# Patient Record
Sex: MALE | Race: WHITE | NOT HISPANIC OR LATINO | Employment: OTHER | URBAN - METROPOLITAN AREA
[De-identification: names, ages, dates, MRNs, and addresses within clinical notes are randomized per-mention and may not be internally consistent; named-entity substitution may affect disease eponyms.]

---

## 2017-06-13 ENCOUNTER — OFFICE VISIT (OUTPATIENT)
Dept: INTERNAL MEDICINE | Facility: CLINIC | Age: 27
End: 2017-06-13

## 2017-06-13 VITALS
DIASTOLIC BLOOD PRESSURE: 66 MMHG | BODY MASS INDEX: 24.09 KG/M2 | WEIGHT: 147 LBS | SYSTOLIC BLOOD PRESSURE: 132 MMHG | HEART RATE: 66 BPM | RESPIRATION RATE: 21 BRPM

## 2017-06-13 DIAGNOSIS — R53.83 OTHER FATIGUE: ICD-10-CM

## 2017-06-13 DIAGNOSIS — Z00.00 HEALTH CARE MAINTENANCE: Primary | ICD-10-CM

## 2017-06-13 LAB
ALBUMIN SERPL-MCNC: 4.7 G/DL (ref 3.2–4.8)
ALBUMIN/GLOB SERPL: 1.6 G/DL (ref 1.5–2.5)
ALP SERPL-CCNC: 70 U/L (ref 25–100)
ALT SERPL W P-5'-P-CCNC: 17 U/L (ref 7–40)
ANION GAP SERPL CALCULATED.3IONS-SCNC: 21 MMOL/L (ref 3–11)
ARTICHOKE IGE QN: 70 MG/DL (ref 0–130)
AST SERPL-CCNC: 29 U/L (ref 0–33)
BASOPHILS # BLD AUTO: 0.04 10*3/MM3 (ref 0–0.2)
BASOPHILS NFR BLD AUTO: 0.5 % (ref 0–1)
BILIRUB SERPL-MCNC: 0.6 MG/DL (ref 0.3–1.2)
BUN BLD-MCNC: 21 MG/DL (ref 9–23)
BUN/CREAT SERPL: 21 (ref 7–25)
CALCIUM SPEC-SCNC: 10.2 MG/DL (ref 8.7–10.4)
CHLORIDE SERPL-SCNC: 104 MMOL/L (ref 99–109)
CHOLEST SERPL-MCNC: 129 MG/DL (ref 0–200)
CO2 SERPL-SCNC: 15 MMOL/L (ref 20–31)
CREAT BLD-MCNC: 1 MG/DL (ref 0.6–1.3)
DEPRECATED RDW RBC AUTO: 39.9 FL (ref 37–54)
EOSINOPHIL # BLD AUTO: 0.06 10*3/MM3 (ref 0.1–0.3)
EOSINOPHIL NFR BLD AUTO: 0.7 % (ref 0–3)
ERYTHROCYTE [DISTWIDTH] IN BLOOD BY AUTOMATED COUNT: 12.5 % (ref 11.3–14.5)
GFR SERPL CREATININE-BSD FRML MDRD: 92 ML/MIN/1.73
GLOBULIN UR ELPH-MCNC: 2.9 GM/DL
GLUCOSE BLD-MCNC: 88 MG/DL (ref 70–100)
HCT VFR BLD AUTO: 47.7 % (ref 38.9–50.9)
HDLC SERPL-MCNC: 42 MG/DL (ref 40–60)
HGB BLD-MCNC: 15.8 G/DL (ref 13.1–17.5)
IMM GRANULOCYTES # BLD: 0.02 10*3/MM3 (ref 0–0.03)
IMM GRANULOCYTES NFR BLD: 0.2 % (ref 0–0.6)
LYMPHOCYTES # BLD AUTO: 2.26 10*3/MM3 (ref 0.6–4.8)
LYMPHOCYTES NFR BLD AUTO: 27.8 % (ref 24–44)
MCH RBC QN AUTO: 29.2 PG (ref 27–31)
MCHC RBC AUTO-ENTMCNC: 33.1 G/DL (ref 32–36)
MCV RBC AUTO: 88 FL (ref 80–99)
MONOCYTES # BLD AUTO: 0.68 10*3/MM3 (ref 0–1)
MONOCYTES NFR BLD AUTO: 8.4 % (ref 0–12)
NEUTROPHILS # BLD AUTO: 5.06 10*3/MM3 (ref 1.5–8.3)
NEUTROPHILS NFR BLD AUTO: 62.4 % (ref 41–71)
PLATELET # BLD AUTO: 265 10*3/MM3 (ref 150–450)
PMV BLD AUTO: 10.9 FL (ref 6–12)
POTASSIUM BLD-SCNC: 4.3 MMOL/L (ref 3.5–5.5)
PROT SERPL-MCNC: 7.6 G/DL (ref 5.7–8.2)
RBC # BLD AUTO: 5.42 10*6/MM3 (ref 4.2–5.76)
SODIUM BLD-SCNC: 140 MMOL/L (ref 132–146)
T4 FREE SERPL-MCNC: 1.17 NG/DL (ref 0.89–1.76)
TRIGL SERPL-MCNC: 43 MG/DL (ref 0–150)
WBC NRBC COR # BLD: 8.12 10*3/MM3 (ref 3.5–10.8)

## 2017-06-13 PROCEDURE — 84439 ASSAY OF FREE THYROXINE: CPT | Performed by: INTERNAL MEDICINE

## 2017-06-13 PROCEDURE — 80053 COMPREHEN METABOLIC PANEL: CPT | Performed by: INTERNAL MEDICINE

## 2017-06-13 PROCEDURE — 36415 COLL VENOUS BLD VENIPUNCTURE: CPT | Performed by: INTERNAL MEDICINE

## 2017-06-13 PROCEDURE — 80061 LIPID PANEL: CPT | Performed by: INTERNAL MEDICINE

## 2017-06-13 PROCEDURE — 85025 COMPLETE CBC W/AUTO DIFF WBC: CPT | Performed by: INTERNAL MEDICINE

## 2017-06-13 PROCEDURE — 99203 OFFICE O/P NEW LOW 30 MIN: CPT | Performed by: INTERNAL MEDICINE

## 2017-06-13 NOTE — PROGRESS NOTES
Subjective   Ronn Lebron is a 26 y.o. male.     History of Present Illness   He has felt bad for one week.  Weak and mentally bad.  No specific problems that seemed to relate to feelings.  No specific medical complaints, no fever, pains or other sx.  He just feels different and not good.  Wanted to get labs.    The following portions of the patient's history were reviewed and updated as appropriate: allergies, current medications, past family history, past medical history, past social history, past surgical history and problem list.   Looking for a job.  No smoke, drink or drugs.    Review of Systems   Constitutional: Positive for fatigue. Negative for appetite change, chills and fever.   HENT: Negative.  Negative for sinus pressure and sore throat.    Eyes: Negative.    Respiratory: Negative.  Negative for cough, chest tightness, shortness of breath and wheezing.    Cardiovascular: Negative.  Negative for chest pain and palpitations.   Gastrointestinal: Negative.  Negative for abdominal distention and abdominal pain.   Genitourinary: Negative.    Musculoskeletal: Negative.    Psychiatric/Behavioral: Positive for dysphoric mood.       Objective   Physical Exam   Constitutional: He appears well-developed and well-nourished.   Neck: Normal range of motion. Neck supple.   Cardiovascular: Normal rate, regular rhythm and normal heart sounds.  Exam reveals no gallop and no friction rub.    No murmur heard.  Pulmonary/Chest: Effort normal and breath sounds normal. No respiratory distress. He has no wheezes. He has no rales. He exhibits no tenderness.   Abdominal: Soft. Bowel sounds are normal.   Nursing note and vitals reviewed.      Assessment/Plan   Ronn was seen today for fatigue.    Diagnoses and all orders for this visit:    Health care maintenance  -     Comprehensive Metabolic Panel  -     Lipid Panel  -     CBC & Differential  -     CBC Auto Differential    Other fatigue  -     T4, Free    Will check  labs.  Believe this is related to some depression.  Told him if no specific findings, and symptoms continue, he will call.

## 2017-07-14 ENCOUNTER — OFFICE VISIT (OUTPATIENT)
Dept: INTERNAL MEDICINE | Facility: CLINIC | Age: 27
End: 2017-07-14

## 2017-07-14 VITALS
SYSTOLIC BLOOD PRESSURE: 124 MMHG | DIASTOLIC BLOOD PRESSURE: 76 MMHG | WEIGHT: 147 LBS | BODY MASS INDEX: 24.09 KG/M2 | RESPIRATION RATE: 14 BRPM | TEMPERATURE: 97.7 F | HEART RATE: 58 BPM

## 2017-07-14 DIAGNOSIS — M26.609 TMJ (TEMPOROMANDIBULAR JOINT SYNDROME): Primary | ICD-10-CM

## 2017-07-14 PROCEDURE — 99213 OFFICE O/P EST LOW 20 MIN: CPT | Performed by: PHYSICIAN ASSISTANT

## 2017-07-14 RX ORDER — FLUOXETINE HYDROCHLORIDE 20 MG/1
1 CAPSULE ORAL DAILY
Refills: 0 | COMMUNITY
Start: 2017-06-14 | End: 2017-12-28

## 2017-07-14 NOTE — PROGRESS NOTES
Subjective   Ronn Lebron is a 26 y.o. male.   Chief Complaint   Patient presents with   • Earache     x 1 week, left ear     History of Present Illness     Pt here with left ear pain x 1 week.  He reports pain near the ear anteriorly and says that it hurts to open and close his mouth or bite down.  No ear drainage, fever, cough, PND, sore throat.      The following portions of the patient's history were reviewed and updated as appropriate: allergies, current medications, past family history, past medical history, past social history, past surgical history and problem list.    Review of Systems   Constitutional: Negative.    HENT: Positive for ear pain. Negative for sore throat.    Respiratory: Negative.  Negative for cough.    Cardiovascular: Negative.    Gastrointestinal: Negative.    Endocrine: Negative for polyuria.   Genitourinary: Negative.    Musculoskeletal: Negative.    Psychiatric/Behavioral: Negative.        Objective   Physical Exam   Constitutional: He is oriented to person, place, and time. He appears well-developed and well-nourished.   HENT:   Head: Normocephalic.   Right Ear: External ear normal.   Left Ear: External ear normal.   Mouth/Throat: Oropharynx is clear and moist.   Tenderness to palpation over left TMJ   Neck: Normal range of motion. No thyromegaly present.   Cardiovascular: Normal rate, regular rhythm and normal heart sounds.    No murmur heard.  Pulmonary/Chest: Effort normal and breath sounds normal.   Lymphadenopathy:     He has no cervical adenopathy.   Neurological: He is alert and oriented to person, place, and time.   Psychiatric: He has a normal mood and affect. His behavior is normal. Judgment and thought content normal.       Assessment/Plan   Ronn was seen today for earache.    Diagnoses and all orders for this visit:    TMJ (temporomandibular joint syndrome)    Instructed to take NSAIDs BID for the next week.

## 2017-12-26 ENCOUNTER — TELEPHONE (OUTPATIENT)
Dept: INTERNAL MEDICINE | Facility: CLINIC | Age: 27
End: 2017-12-26

## 2017-12-26 NOTE — TELEPHONE ENCOUNTER
S/W PT, STATES HE DEVELOPED A RING WORM ON HIS BACK AND TREATED WITH TEA TREE OIL AND THEN LOTRMEN CREAM AND THAT IT HAS SPREAD AND THAT HE NOW HAS MULTIPLE SPOTS.    DISCUSSED WITH DR GUERRA.  STATES NEEDS TO BE SEEN.    INFORMED PT OF NEED TO BE SEEN.  APPT SCHEDULED FOR Thursday AT 11:15AM WITH DR GUERRA.  LEVI VARELA APPREC.

## 2017-12-26 NOTE — TELEPHONE ENCOUNTER
----- Message from Emelyn Geiger sent at 12/26/2017 10:32 AM EST -----  Patient was diagnosed with ring worm and has some questions.  Patient can be reached at [phone].

## 2017-12-28 ENCOUNTER — OFFICE VISIT (OUTPATIENT)
Dept: INTERNAL MEDICINE | Facility: CLINIC | Age: 27
End: 2017-12-28

## 2017-12-28 VITALS
DIASTOLIC BLOOD PRESSURE: 64 MMHG | HEART RATE: 56 BPM | BODY MASS INDEX: 23.76 KG/M2 | SYSTOLIC BLOOD PRESSURE: 114 MMHG | TEMPERATURE: 98.3 F | WEIGHT: 145 LBS | RESPIRATION RATE: 16 BRPM

## 2017-12-28 DIAGNOSIS — Z72.51 HIGH RISK HETEROSEXUAL BEHAVIOR: ICD-10-CM

## 2017-12-28 DIAGNOSIS — L42 PITYRIASIS ROSEA: Primary | ICD-10-CM

## 2017-12-28 LAB
HAV IGM SERPL QL IA: NORMAL
HBV CORE IGM SERPL QL IA: NORMAL
HBV SURFACE AG SERPL QL IA: NORMAL
HCV AB SER DONR QL: NORMAL
HIV1+2 AB SER QL: NORMAL

## 2017-12-28 PROCEDURE — 87491 CHLMYD TRACH DNA AMP PROBE: CPT | Performed by: INTERNAL MEDICINE

## 2017-12-28 PROCEDURE — G0432 EIA HIV-1/HIV-2 SCREEN: HCPCS | Performed by: INTERNAL MEDICINE

## 2017-12-28 PROCEDURE — 80074 ACUTE HEPATITIS PANEL: CPT | Performed by: INTERNAL MEDICINE

## 2017-12-28 PROCEDURE — 87591 N.GONORRHOEAE DNA AMP PROB: CPT | Performed by: INTERNAL MEDICINE

## 2017-12-28 PROCEDURE — 86696 HERPES SIMPLEX TYPE 2 TEST: CPT | Performed by: INTERNAL MEDICINE

## 2017-12-28 PROCEDURE — 86695 HERPES SIMPLEX TYPE 1 TEST: CPT | Performed by: INTERNAL MEDICINE

## 2017-12-28 PROCEDURE — 36415 COLL VENOUS BLD VENIPUNCTURE: CPT | Performed by: INTERNAL MEDICINE

## 2017-12-28 PROCEDURE — 99213 OFFICE O/P EST LOW 20 MIN: CPT | Performed by: INTERNAL MEDICINE

## 2017-12-28 PROCEDURE — 86592 SYPHILIS TEST NON-TREP QUAL: CPT | Performed by: INTERNAL MEDICINE

## 2017-12-28 NOTE — PROGRESS NOTES
Chief Complaint   Patient presents with   • Tinea     RINGWORM SPREADING X 2 WEEKS       History of Present Illness      He presents for an initial evaluation with pityriasis rosea on This has been present for two weeks. The condition is non-painful. Treatment has been administered at home. The prior treatment consisted of topical antifungals. He had a viral illness prior to this starting. In addition, he wants to be checked for STDs as he has had unprotected intercourse with multiple partners.    Review of Systems    GENERAL- Denies Unexplained Weight Loss, Fever, Chills, Sweats, Fatigue, Weakness or Malaise.    Medications    No current outpatient prescriptions on file.     Allergies    No Known Allergies    Problem List    There is no problem list on file for this patient.      Medications, Allergies, Problems List and Past History were reviewed and updated.    Physical Examination    /64 (BP Location: Left arm, Patient Position: Sitting, Cuff Size: Adult)  Pulse 56  Temp 98.3 °F (36.8 °C) (Temporal Artery )   Resp 16  Wt 65.8 kg (145 lb)  BMI 23.76 kg/m2      The patient has pityriasis rosea on The pityriasis is pale and pink. The affected skin is macular and the condition is noted to be spread over a wide area, well demarcated and has multiple lesions with collarettes.    Impression and Assessment    Pityriasis Rosea.    STD Screen.    Plan    Pityriasis Rosea Plan: The skin condition will be monitored.    STD Screen Plan: Safe sex practices were discussed.    Ronn was seen today for tinea.    Diagnoses and all orders for this visit:    Pityriasis rosea  -     RPR    High risk heterosexual behavior  -     Hepatitis Panel, Acute  -     RPR  -     HIV-1 / O / 2 Ag / Antibody 4th Generation  -     Chlamydia trachomatis, Neisseria gonorrhoeae, PCR - Swab, Urethra  -     HSV 1 & 2 - Specific Antibody, IgG          Return to Office    The patient was instructed to return for follow-up as needed.    The  patient was instructed to return sooner if the condition changes, worsens, or doesn't resolve.

## 2017-12-29 LAB
HSV1 IGG SER IA-ACNC: <0.91 INDEX (ref 0–0.9)
HSV2 IGG SER IA-ACNC: <0.91 INDEX (ref 0–0.9)
RPR SER QL: NORMAL

## 2018-01-01 LAB
C TRACH RRNA SPEC DONR QL NAA+PROBE: NEGATIVE
N GONORRHOEA DNA SPEC QL NAA+PROBE: NEGATIVE

## 2018-06-21 ENCOUNTER — TELEPHONE (OUTPATIENT)
Dept: INTERNAL MEDICINE | Facility: CLINIC | Age: 28
End: 2018-06-21

## 2018-06-21 RX ORDER — LEVOCETIRIZINE DIHYDROCHLORIDE 5 MG/1
5 TABLET, FILM COATED ORAL EVERY EVENING
Qty: 30 TABLET | Refills: 5 | Status: SHIPPED | OUTPATIENT
Start: 2018-06-21 | End: 2018-07-10 | Stop reason: ALTCHOICE

## 2018-06-21 NOTE — TELEPHONE ENCOUNTER
----- Message from April YOMI Solis sent at 6/21/2018  1:28 PM EDT -----  Contact: PT   CALL FROM PT REQUESTING A RX FOR ALLERGY MEDS STATES THAT OVER THE COUNTER MEDS ARE NOT HELPING AND THEY TOLD HIM AT THE PHARMACY THAT HE HAS REACHED HIS LIMIT FOR BUYING THEM OVER THE COUNTER. CALL BACK NUMBER FOR [phone]  USES [facility] - [id number] [facility] - [phone]  - [phone] (Phone)  [phone] (Fax)

## 2018-06-21 NOTE — TELEPHONE ENCOUNTER
Not sure what he wants, but can try flonase if he likes.  Call and see if wants to try a steroid nasal spray.

## 2018-07-06 ENCOUNTER — TELEPHONE (OUTPATIENT)
Dept: INTERNAL MEDICINE | Facility: CLINIC | Age: 28
End: 2018-07-06

## 2018-07-06 NOTE — TELEPHONE ENCOUNTER
----- Message from Aretha Sainz sent at 7/6/2018 12:42 PM EDT -----  [phone]    HE THINKS HIS NEW ALLERGY MED IS MAKING HIM REALLY TIRED.  AND HE ISNT SURE ITS WORKING AS WELL AS AS EVGENY CELAYA DOES.  ANY OTHER RECOMMENDATIONS?    [facility]

## 2018-07-10 DIAGNOSIS — J30.2 CHRONIC SEASONAL ALLERGIC RHINITIS, UNSPECIFIED TRIGGER: ICD-10-CM

## 2018-07-10 DIAGNOSIS — R09.81 NASAL CONGESTION: Primary | ICD-10-CM

## 2018-07-10 RX ORDER — FEXOFENADINE HCL AND PSEUDOEPHEDRINE HCI 180; 240 MG/1; MG/1
1 TABLET, EXTENDED RELEASE ORAL DAILY
Qty: 30 TABLET | Refills: 1 | Status: SHIPPED | OUTPATIENT
Start: 2018-07-10 | End: 2018-11-07 | Stop reason: SDUPTHER

## 2018-07-23 ENCOUNTER — OFFICE VISIT (OUTPATIENT)
Dept: INTERNAL MEDICINE | Facility: CLINIC | Age: 28
End: 2018-07-23

## 2018-07-23 VITALS
HEART RATE: 70 BPM | BODY MASS INDEX: 23.6 KG/M2 | DIASTOLIC BLOOD PRESSURE: 64 MMHG | TEMPERATURE: 98.2 F | WEIGHT: 144 LBS | RESPIRATION RATE: 21 BRPM | SYSTOLIC BLOOD PRESSURE: 130 MMHG

## 2018-07-23 DIAGNOSIS — N48.9 PENILE LESION: Primary | ICD-10-CM

## 2018-07-23 PROCEDURE — 99213 OFFICE O/P EST LOW 20 MIN: CPT | Performed by: INTERNAL MEDICINE

## 2018-07-23 NOTE — PROGRESS NOTES
Subjective   Ronn Lebron is a 27 y.o. male.     History of Present Illness   Last few days noticed a lesion on the bottom of his penis.  Not painful or itchy.  No unprotected sx in a long time.  No other episodes.  Had some bug bits on legs.    The following portions of the patient's history were reviewed and updated as appropriate: allergies, current medications, past medical history and problem list.    Review of Systems   Constitutional: Negative.  Negative for fatigue and fever.   Genitourinary: Negative for dysuria.       Objective   Physical Exam   Skin:   Small cystic like lesion on anterior dorsum of shaft.  Not open, not red, not painful.           Assessment/Plan   Ronn was seen today for bump on penis.    Diagnoses and all orders for this visit:    Penile lesion    This most likely, is a small cyst.  Does not appear to be herpes or other significant lesion.  Told him if not better or gone in 2 weeks to call and will refer to derm.

## 2018-07-30 ENCOUNTER — OFFICE VISIT (OUTPATIENT)
Dept: INTERNAL MEDICINE | Facility: CLINIC | Age: 28
End: 2018-07-30

## 2018-07-30 VITALS
WEIGHT: 144 LBS | BODY MASS INDEX: 23.6 KG/M2 | SYSTOLIC BLOOD PRESSURE: 124 MMHG | HEART RATE: 84 BPM | DIASTOLIC BLOOD PRESSURE: 66 MMHG | RESPIRATION RATE: 21 BRPM

## 2018-07-30 DIAGNOSIS — Z20.2 POSSIBLE EXPOSURE TO STD: Primary | ICD-10-CM

## 2018-07-30 LAB — HIV1+2 AB SER QL: NORMAL

## 2018-07-30 PROCEDURE — 99213 OFFICE O/P EST LOW 20 MIN: CPT | Performed by: INTERNAL MEDICINE

## 2018-07-30 PROCEDURE — 86592 SYPHILIS TEST NON-TREP QUAL: CPT | Performed by: INTERNAL MEDICINE

## 2018-07-30 PROCEDURE — 36415 COLL VENOUS BLD VENIPUNCTURE: CPT | Performed by: INTERNAL MEDICINE

## 2018-07-30 PROCEDURE — 86695 HERPES SIMPLEX TYPE 1 TEST: CPT | Performed by: INTERNAL MEDICINE

## 2018-07-30 PROCEDURE — 86696 HERPES SIMPLEX TYPE 2 TEST: CPT | Performed by: INTERNAL MEDICINE

## 2018-07-30 PROCEDURE — G0432 EIA HIV-1/HIV-2 SCREEN: HCPCS | Performed by: INTERNAL MEDICINE

## 2018-07-30 NOTE — PROGRESS NOTES
Subjective   Ronn Lebron is a 27 y.o. male.     History of Present Illness   He was concerned about sexual exposure.  Had a condom break.  Had a small red bump on his shaft a couple of weeks ago which is gone.    The following portions of the patient's history were reviewed and updated as appropriate: allergies, current medications, past medical history and problem list.    Review of Systems   Constitutional: Negative.    Genitourinary: Negative.        Objective   Physical Exam   Constitutional: He appears well-developed and well-nourished.   No exam.   Nursing note and vitals reviewed.        Assessment/Plan   Ronn was seen today for std testing.    Diagnoses and all orders for this visit:    Possible exposure to STD  -     HIV-1 / O / 2 Ag / Antibody 4th Generation  -     HSV 1 & 2 - Specific Antibody, IgG  -     RPR    Labs ordered.  Discussed with patient.

## 2018-08-01 ENCOUNTER — TELEPHONE (OUTPATIENT)
Dept: INTERNAL MEDICINE | Facility: CLINIC | Age: 28
End: 2018-08-01

## 2018-08-01 NOTE — TELEPHONE ENCOUNTER
----- Message from Emelyn Geiger sent at 8/1/2018 12:14 PM EDT -----  Patient states Dr. Cabezas said he would have lab results back in a few days.  Patient wants to know if those results are ready.  Call him at [phone].  Understands Dr. Cabezas out of the office today.

## 2018-08-29 ENCOUNTER — OFFICE VISIT (OUTPATIENT)
Dept: INTERNAL MEDICINE | Facility: CLINIC | Age: 28
End: 2018-08-29

## 2018-08-29 VITALS
TEMPERATURE: 98.1 F | WEIGHT: 145 LBS | SYSTOLIC BLOOD PRESSURE: 110 MMHG | HEART RATE: 65 BPM | RESPIRATION RATE: 16 BRPM | BODY MASS INDEX: 23.76 KG/M2 | DIASTOLIC BLOOD PRESSURE: 68 MMHG | OXYGEN SATURATION: 99 %

## 2018-08-29 DIAGNOSIS — H10.9 BACTERIAL CONJUNCTIVITIS OF LEFT EYE: Primary | ICD-10-CM

## 2018-08-29 PROCEDURE — 99213 OFFICE O/P EST LOW 20 MIN: CPT | Performed by: PHYSICIAN ASSISTANT

## 2018-08-29 RX ORDER — CIPROFLOXACIN HYDROCHLORIDE 3.5 MG/ML
2 SOLUTION/ DROPS TOPICAL 4 TIMES DAILY
Qty: 10 ML | Refills: 0 | Status: SHIPPED | OUTPATIENT
Start: 2018-08-29 | End: 2018-08-30 | Stop reason: SDUPTHER

## 2018-08-29 NOTE — PROGRESS NOTES
Chief Complaint   Patient presents with   • Conjunctivitis       Subjective       History of Present Illness     Ronn Lebron is a 27 y.o. male. He presents with <1 day history of L eye redness, itching, and discharge. Patient states he woke up this morning and his L eye was crusted over. He notes white-yellow discharge from eye and feeling that there is a film over his eye. It is itchy but not painful. No Sx in R eye. Pt wears contacts normally but did not put them in this morning, wearing glasses today. He has not tried anything for the treatment of this issue.       The following portions of the patient's history were reviewed and updated as appropriate: allergies, current medications, past medical history, past social history and problem list.    No Known Allergies  Social History   Substance Use Topics   • Smoking status: Never Smoker   • Smokeless tobacco: Not on file   • Alcohol use Not on file         Current Outpatient Prescriptions:   •  fexofenadine-pseudoephedrine (ALLEGRA-D ALLERGY & CONGESTION) 180-240 MG per 24 hr tablet, Take 1 tablet by mouth Daily., Disp: 30 tablet, Rfl: 1  •  ciprofloxacin (CILOXAN) 0.3 % ophthalmic solution, Administer 2 drops into the left eye 4 (Four) Times a Day., Disp: 10 mL, Rfl: 0    Review of Systems   Constitutional: Negative for chills, fatigue and fever.   HENT: Negative for congestion, ear pain, sore throat and trouble swallowing.    Eyes: Positive for discharge, redness and itching. Negative for blurred vision, double vision and pain.   Respiratory: Negative for cough, shortness of breath and wheezing.    Cardiovascular: Negative for chest pain and palpitations.   Gastrointestinal: Negative for abdominal pain, diarrhea, nausea and vomiting.   Genitourinary: Negative for dysuria and hematuria.   Musculoskeletal: Negative for back pain.   Skin: Negative for rash.   Neurological: Negative for dizziness, syncope, weakness and headache.   Hematological: Does not  bruise/bleed easily.       Objective   Vitals:    08/29/18 1750   BP: 110/68   Pulse: 65   Resp: 16   Temp: 98.1 °F (36.7 °C)   SpO2: 99%     Physical Exam   Constitutional: He appears well-developed and well-nourished.   HENT:   Head: Normocephalic and atraumatic.   Right Ear: Tympanic membrane, external ear and ear canal normal.   Left Ear: Tympanic membrane, external ear and ear canal normal.   Nose: Nose normal.   Mouth/Throat: Oropharynx is clear and moist and mucous membranes are normal.   Eyes: Pupils are equal, round, and reactive to light. Right eye exhibits no discharge. Left eye exhibits discharge. Right conjunctiva is not injected. Left conjunctiva is injected. Right eye exhibits normal extraocular motion. Left eye exhibits normal extraocular motion.   Cardiovascular: Normal rate, regular rhythm and intact distal pulses.    No murmur heard.  Pulmonary/Chest: Effort normal and breath sounds normal. He has no wheezes. He has no rales.   Lymphadenopathy:     He has no cervical adenopathy.   Skin: No rash noted.   Psychiatric: He has a normal mood and affect. His behavior is normal.       Assessment/Plan   Ronn was seen today for conjunctivitis.    Diagnoses and all orders for this visit:    Bacterial conjunctivitis of left eye  -     ciprofloxacin (CILOXAN) 0.3 % ophthalmic solution; Administer 2 drops into the left eye 4 (Four) Times a Day.      Patient advised to wear glasses for next 2 days and then begin new contacts.   Cool compress to eye as provides relief.            Return if symptoms worsen or fail to improve.

## 2018-08-30 ENCOUNTER — TELEPHONE (OUTPATIENT)
Dept: INTERNAL MEDICINE | Facility: CLINIC | Age: 28
End: 2018-08-30

## 2018-08-30 DIAGNOSIS — H10.9 BACTERIAL CONJUNCTIVITIS OF LEFT EYE: ICD-10-CM

## 2018-08-30 RX ORDER — CIPROFLOXACIN HYDROCHLORIDE 3.5 MG/ML
2 SOLUTION/ DROPS TOPICAL 4 TIMES DAILY
Qty: 10 ML | Refills: 0 | Status: SHIPPED | OUTPATIENT
Start: 2018-08-30 | End: 2018-10-19

## 2018-09-17 ENCOUNTER — TELEPHONE (OUTPATIENT)
Dept: INTERNAL MEDICINE | Facility: CLINIC | Age: 28
End: 2018-09-17

## 2018-09-17 RX ORDER — FLUOXETINE HYDROCHLORIDE 20 MG/1
CAPSULE ORAL
Qty: 30 CAPSULE | Refills: 1 | Status: SHIPPED | OUTPATIENT
Start: 2018-09-17 | End: 2019-04-09

## 2018-09-17 NOTE — TELEPHONE ENCOUNTER
Dr. Cabezas requested a one month follow up to discuss medication.  Patient has been scheduled and notified of appointment.

## 2018-09-17 NOTE — TELEPHONE ENCOUNTER
"Patient reports that he hasn't taken medication in a year but it was effective last time.  He feels \"a little down,\" and would like to restart medication.  He is willing to make follow up if necessary.  Thank you.  "

## 2018-10-19 ENCOUNTER — OFFICE VISIT (OUTPATIENT)
Dept: INTERNAL MEDICINE | Facility: CLINIC | Age: 28
End: 2018-10-19

## 2018-10-19 VITALS
HEIGHT: 66 IN | BODY MASS INDEX: 23.3 KG/M2 | TEMPERATURE: 98.3 F | HEART RATE: 89 BPM | WEIGHT: 145 LBS | RESPIRATION RATE: 18 BRPM | DIASTOLIC BLOOD PRESSURE: 70 MMHG | SYSTOLIC BLOOD PRESSURE: 120 MMHG

## 2018-10-19 DIAGNOSIS — J01.00 ACUTE MAXILLARY SINUSITIS, RECURRENCE NOT SPECIFIED: Primary | ICD-10-CM

## 2018-10-19 PROCEDURE — 99213 OFFICE O/P EST LOW 20 MIN: CPT | Performed by: NURSE PRACTITIONER

## 2018-10-19 RX ORDER — AMOXICILLIN AND CLAVULANATE POTASSIUM 875; 125 MG/1; MG/1
1 TABLET, FILM COATED ORAL EVERY 12 HOURS SCHEDULED
Qty: 20 TABLET | Refills: 0 | Status: SHIPPED | OUTPATIENT
Start: 2018-10-19 | End: 2018-10-29

## 2018-10-19 NOTE — PROGRESS NOTES
"Subjective:    Ronn Lebron is a 28 y.o. male.     Chief Complaint   Patient presents with   • Sinus Problem     sinus pressure x2 days    • Nasal Congestion     x 2 days        History of Present Illness   Patient complains of sinus pressure with green mucus and feeling tired with nasal congestion. He has a history of allergies that makes him prone to sinus infections once a year usually. Worsened by weather changes. No fever.    Current Outpatient Prescriptions:   •  fexofenadine-pseudoephedrine (ALLEGRA-D ALLERGY & CONGESTION) 180-240 MG per 24 hr tablet, Take 1 tablet by mouth Daily., Disp: 30 tablet, Rfl: 1  •  FLUoxetine (PROzac) 20 MG capsule, take 1 capsule by mouth once daily, Disp: 30 capsule, Rfl: 1  •  amoxicillin-clavulanate (AUGMENTIN) 875-125 MG per tablet, Take 1 tablet by mouth Every 12 (Twelve) Hours., Disp: 20 tablet, Rfl: 0     The following portions of the patient's history were reviewed and updated as appropriate: allergies, current medications, past family history, past medical history, past social history, past surgical history and problem list.    Review of Systems   Constitutional: Negative for chills, fatigue and fever.   HENT: Positive for congestion and sinus pressure. Negative for ear pain, postnasal drip, rhinorrhea, sneezing and sore throat.    Eyes: Negative for pain, discharge, redness and itching.   Respiratory: Negative for cough, chest tightness, shortness of breath and wheezing.    Cardiovascular: Negative for chest pain.   Gastrointestinal: Negative for abdominal pain, diarrhea, nausea and vomiting.   Musculoskeletal: Negative for arthralgias and myalgias.   Skin: Negative for rash.   Neurological: Negative for headaches.   Hematological: Negative for adenopathy.       Objective:    /70 (BP Location: Right arm, Patient Position: Sitting, Cuff Size: Adult)   Pulse 89   Temp 98.3 °F (36.8 °C)   Resp 18   Ht 166.4 cm (65.5\")   Wt 65.8 kg (145 lb)   BMI 23.76 kg/m² "     Physical Exam   Constitutional: He appears well-developed and well-nourished.   HENT:   Head: Normocephalic and atraumatic.   Right Ear: Hearing, tympanic membrane, external ear and ear canal normal.   Left Ear: Hearing, tympanic membrane, external ear and ear canal normal.   Nose: Mucosal edema present. Right sinus exhibits maxillary sinus tenderness. Right sinus exhibits no frontal sinus tenderness. Left sinus exhibits maxillary sinus tenderness. Left sinus exhibits no frontal sinus tenderness.   Mouth/Throat: Oropharynx is clear and moist and mucous membranes are normal. No oral lesions.   Mild sinus tenderness to palpation. Nasal congestion.   Eyes: Conjunctivae are normal.   Neck: Normal range of motion. Neck supple.   Cardiovascular: Normal rate and regular rhythm.    No murmur heard.  Pulmonary/Chest: Effort normal and breath sounds normal.   Lymphadenopathy:     He has no cervical adenopathy.   Skin: No rash noted.   Psychiatric: He has a normal mood and affect.   Nursing note and vitals reviewed.      Assessment/Plan:    Ronn was seen today for sinus problem and nasal congestion.    Diagnoses and all orders for this visit:    Acute maxillary sinusitis, recurrence not specified    Other orders  -     amoxicillin-clavulanate (AUGMENTIN) 875-125 MG per tablet; Take 1 tablet by mouth Every 12 (Twelve) Hours.        Return if symptoms worsen or fail to improve.

## 2018-10-29 ENCOUNTER — OFFICE VISIT (OUTPATIENT)
Dept: INTERNAL MEDICINE | Facility: CLINIC | Age: 28
End: 2018-10-29

## 2018-10-29 VITALS
RESPIRATION RATE: 20 BRPM | BODY MASS INDEX: 23.27 KG/M2 | HEART RATE: 92 BPM | DIASTOLIC BLOOD PRESSURE: 72 MMHG | WEIGHT: 142 LBS | SYSTOLIC BLOOD PRESSURE: 124 MMHG

## 2018-10-29 DIAGNOSIS — F32.9 REACTIVE DEPRESSION: Primary | ICD-10-CM

## 2018-10-29 PROCEDURE — 99212 OFFICE O/P EST SF 10 MIN: CPT | Performed by: INTERNAL MEDICINE

## 2018-10-29 NOTE — PROGRESS NOTES
Subjective   Ronn Lebron is a 28 y.o. male.     History of Present Illness     The following portions of the patient's history were reviewed and updated as appropriate: allergies, current medications, past medical history and problem list.   Has had intermittent spells of depression in past.  Started back on prozac a month or so ago and stopped after 2 weeks after he got better.  He feels fine now and is not on medication.    Review of Systems   Constitutional: Negative.    Eyes: Negative.    Psychiatric/Behavioral: Negative for dysphoric mood and depressed mood. The patient is not nervous/anxious.        Objective   Physical Exam   Constitutional: He appears well-developed and well-nourished.   No exam needed.   Nursing note and vitals reviewed.        Assessment/Plan   Ronn was seen today for anxiety.    Diagnoses and all orders for this visit:    Reactive depression    His sx are improved.  No treatment necessary.  Recheck here prn.

## 2018-11-07 DIAGNOSIS — R09.81 NASAL CONGESTION: ICD-10-CM

## 2018-11-07 DIAGNOSIS — J30.2 CHRONIC SEASONAL ALLERGIC RHINITIS: ICD-10-CM

## 2018-11-07 RX ORDER — FEXOFENADINE HYDROCHLORIDE AND PSEUDOEPHEDRINE HYDROCHLORIDE 180; 240 MG/1; MG/1
TABLET, FILM COATED, EXTENDED RELEASE ORAL
Qty: 30 TABLET | Refills: 5 | Status: SHIPPED | OUTPATIENT
Start: 2018-11-07 | End: 2020-10-13

## 2018-11-07 NOTE — TELEPHONE ENCOUNTER
----- Message from Reema Avendano sent at 11/7/2018 10:17 AM EST -----  PATIENT IS NEEDING A REFILL ON ALLEWARDA D    [facility] - [id number] [facility] - [phone] [facility] [phone] FX    PATIENT CALL BACK : [phone]    THANK YOU

## 2018-11-27 ENCOUNTER — TELEPHONE (OUTPATIENT)
Dept: INTERNAL MEDICINE | Facility: CLINIC | Age: 28
End: 2018-11-27

## 2018-11-27 RX ORDER — AZELASTINE HYDROCHLORIDE, FLUTICASONE PROPIONATE 137; 50 UG/1; UG/1
1 SPRAY, METERED NASAL DAILY
Qty: 1 BOTTLE | Refills: 3 | Status: SHIPPED | OUTPATIENT
Start: 2018-11-27 | End: 2020-10-13

## 2018-11-27 NOTE — TELEPHONE ENCOUNTER
----- Message from Denita Bird sent at 11/26/2018  3:50 PM EST -----  PATIENT STATES HE WOULD LIKE TO TRY [facility] FOR HIS INCREASED ALLERGY SYMPTOMS. [facility]. HE CAN BE REACHED [phone]

## 2019-04-09 ENCOUNTER — OFFICE VISIT (OUTPATIENT)
Dept: INTERNAL MEDICINE | Facility: CLINIC | Age: 29
End: 2019-04-09

## 2019-04-09 VITALS
TEMPERATURE: 98.9 F | BODY MASS INDEX: 24.03 KG/M2 | HEART RATE: 64 BPM | RESPIRATION RATE: 16 BRPM | HEIGHT: 66 IN | OXYGEN SATURATION: 98 % | SYSTOLIC BLOOD PRESSURE: 124 MMHG | DIASTOLIC BLOOD PRESSURE: 70 MMHG | WEIGHT: 149.5 LBS

## 2019-04-09 DIAGNOSIS — J01.01 ACUTE RECURRENT MAXILLARY SINUSITIS: Primary | ICD-10-CM

## 2019-04-09 PROCEDURE — 99213 OFFICE O/P EST LOW 20 MIN: CPT | Performed by: PHYSICIAN ASSISTANT

## 2019-04-09 RX ORDER — AMOXICILLIN AND CLAVULANATE POTASSIUM 875; 125 MG/1; MG/1
1 TABLET, FILM COATED ORAL 2 TIMES DAILY
Qty: 20 TABLET | Refills: 0 | Status: CANCELLED | OUTPATIENT
Start: 2019-04-09

## 2019-04-09 RX ORDER — CEFDINIR 300 MG/1
300 CAPSULE ORAL 2 TIMES DAILY
Qty: 20 CAPSULE | Refills: 0 | Status: SHIPPED | OUTPATIENT
Start: 2019-04-09 | End: 2019-04-29

## 2019-04-09 NOTE — PROGRESS NOTES
Chief Complaint   Patient presents with   • Sore Throat     x3 days   • Fatigue     x3 days   • URI     x3 days       Subjective       History of Present Illness     Ronn Lebron is a 28 y.o. male. He presents with 3 day history of sinusitis. Pt states he developed nasal congestion, HA, and sinus pain and pressure 3 days ago. Sinus pressure bilaterally, pain at L cheek/ behind L eye. He has felt very fatigued, had a mile sore throat with drainage. He is taking allegra daily and using flonase. Pt has recurrent sinus infections 3-4x/year. He had sinus infection about 5 weeks ago, had Augmentin which he finished as directed, seen at [facility]. Pt has seen an allergist in the past, about 8 years ago and did have allergy shots which helped some at that time. No recent visit to allergy specialist.         The following portions of the patient's history were reviewed and updated as appropriate: allergies, current medications, past medical history, past social history, past surgical history and problem list.    No Known Allergies  Social History     Tobacco Use   • Smoking status: Never Smoker   • Smokeless tobacco: Never Used   Substance Use Topics   • Alcohol use: No         Current Outpatient Medications:   •  ALLEGRA-D ALLERGY & CONGESTION 180-240 MG per 24 hr tablet, take 1 tablet by mouth once daily, Disp: 30 tablet, Rfl: 5  •  Azelastine-Fluticasone 137-50 MCG/ACT suspension, 1 spray into the nostril(s) as directed by provider Daily., Disp: 1 bottle, Rfl: 3  •  cefdinir (OMNICEF) 300 MG capsule, Take 1 capsule by mouth 2 (Two) Times a Day., Disp: 20 capsule, Rfl: 0    Review of Systems   Constitutional: Positive for fatigue. Negative for chills and fever.   HENT: Positive for congestion, postnasal drip, sinus pressure and sore throat. Negative for ear pain and trouble swallowing.    Respiratory: Negative for cough and shortness of breath.    Cardiovascular: Negative for chest pain.   Gastrointestinal: Negative for  abdominal pain, diarrhea, nausea and vomiting.   Genitourinary: Negative for dysuria.   Neurological: Positive for headache. Negative for dizziness.       Objective   Vitals:    04/09/19 1216   BP: 124/70   Pulse: 64   Resp: 16   Temp: 98.9 °F (37.2 °C)   SpO2: 98%     Physical Exam   Constitutional: He appears well-developed and well-nourished.   HENT:   Head: Normocephalic and atraumatic.   Right Ear: Tympanic membrane, external ear and ear canal normal.   Left Ear: Tympanic membrane, external ear and ear canal normal.   Nose: Congestion present. Right sinus exhibits no maxillary sinus tenderness and no frontal sinus tenderness. Left sinus exhibits maxillary sinus tenderness. Left sinus exhibits no frontal sinus tenderness.   Mouth/Throat: Oropharynx is clear and moist and mucous membranes are normal.   Eyes: Conjunctivae are normal.   Neck: Normal range of motion. Neck supple.   Cardiovascular: Normal rate and regular rhythm.   No murmur heard.  Pulmonary/Chest: Effort normal and breath sounds normal. He has no wheezes. He has no rales.   Lymphadenopathy:     He has no cervical adenopathy.   Psychiatric: He has a normal mood and affect. His behavior is normal.             Assessment/Plan   Ronn was seen today for sore throat, fatigue and uri.    Diagnoses and all orders for this visit:    Acute recurrent maxillary sinusitis  -     cefdinir (OMNICEF) 300 MG capsule; Take 1 capsule by mouth 2 (Two) Times a Day.  -     Ambulatory Referral to Allergy    Continue flonase and allegra. Finish all Abx through completion.   Referral to allergy given recurrent sinus infections with incomplete resolution.            Return if symptoms worsen or fail to improve.

## 2019-04-18 ENCOUNTER — TELEPHONE (OUTPATIENT)
Dept: INTERNAL MEDICINE | Facility: CLINIC | Age: 29
End: 2019-04-18

## 2019-04-18 RX ORDER — FLUOXETINE HYDROCHLORIDE 20 MG/1
20 CAPSULE ORAL DAILY
Qty: 30 CAPSULE | Refills: 2 | Status: SHIPPED | OUTPATIENT
Start: 2019-04-18 | End: 2020-10-13

## 2019-04-18 NOTE — TELEPHONE ENCOUNTER
Per Dr Cabezas   FLUoxetine (PROzac) 20 MG capsule [[id number]]   Order Details   Dose: 20 mg Route: Oral Frequency: Daily   Dispense Quantity: 30 capsule Refills: 2 Fills remaining: --           Sig: Take 1 capsule by mouth Daily        Sent to [facility] [place]     Informed pt of rx being sent in

## 2019-04-29 ENCOUNTER — OFFICE VISIT (OUTPATIENT)
Dept: INTERNAL MEDICINE | Facility: CLINIC | Age: 29
End: 2019-04-29

## 2019-04-29 VITALS
DIASTOLIC BLOOD PRESSURE: 80 MMHG | OXYGEN SATURATION: 97 % | RESPIRATION RATE: 16 BRPM | BODY MASS INDEX: 24.56 KG/M2 | HEIGHT: 66 IN | SYSTOLIC BLOOD PRESSURE: 118 MMHG | TEMPERATURE: 97 F | HEART RATE: 70 BPM | WEIGHT: 152.8 LBS

## 2019-04-29 DIAGNOSIS — J01.01 RECURRENT MAXILLARY SINUSITIS: Primary | ICD-10-CM

## 2019-04-29 PROCEDURE — 99213 OFFICE O/P EST LOW 20 MIN: CPT | Performed by: PHYSICIAN ASSISTANT

## 2019-04-29 NOTE — PROGRESS NOTES
Chief Complaint   Patient presents with   • Sinus Problem     x2 months       Subjective       History of Present Illness     Ronn Lebron is a 28 y.o. male. He presents for follow up of recurrent sinusitis. Pt recently tx with Cefdinir on 4/9/2019 and finished all Abx for sinusitis. Pt states he did have some improvement at that time, but not total resolution and symptoms have again worsened x1 week since finishing Abx. He c/o congestion, sinus pressure at cheeks and behind eyes, intermittent HA, and sore throat with drainage. He has an occasional dry cough, but no SOA or wheezing. Denies fever, chills, ear pain, abdominal pain, N/V/D. No voice change. He is taking allegra-D and astelin-fluticasone combo spray daily. No additional meds for this issue. He has not seen ENT for this issue. He does have an allergy referral pending for chronic allergies. He has been contact by allergy office, but has not yet scheduled as he wanted to be feeling better before scheduling allergy testing. Pt states this sinus infection has been present for 2+ months, and has long hx recurrent sinus infections.     The following portions of the patient's history were reviewed and updated as appropriate: allergies, current medications, past medical history, past social history and problem list.    No Known Allergies  Social History     Tobacco Use   • Smoking status: Never Smoker   • Smokeless tobacco: Never Used   Substance Use Topics   • Alcohol use: No         Current Outpatient Medications:   •  ALLEGRA-D ALLERGY & CONGESTION 180-240 MG per 24 hr tablet, take 1 tablet by mouth once daily, Disp: 30 tablet, Rfl: 5  •  Azelastine-Fluticasone 137-50 MCG/ACT suspension, 1 spray into the nostril(s) as directed by provider Daily., Disp: 1 bottle, Rfl: 3  •  FLUoxetine (PROzac) 20 MG capsule, Take 1 capsule by mouth Daily., Disp: 30 capsule, Rfl: 2    Review of Systems   Constitutional: Positive for fatigue. Negative for chills and fever.    HENT: Positive for congestion, postnasal drip, sinus pressure and sore throat. Negative for ear pain, sneezing and trouble swallowing.    Eyes: Negative for pain and visual disturbance.   Respiratory: Positive for cough. Negative for shortness of breath and wheezing.    Cardiovascular: Negative for chest pain.   Gastrointestinal: Negative for abdominal pain, diarrhea, nausea and vomiting.   Neurological: Positive for headache. Negative for dizziness.       Objective   Vitals:    04/29/19 1203   BP: 118/80   Pulse: 70   Resp: 16   Temp: 97 °F (36.1 °C)   SpO2: 97%     Physical Exam   Constitutional: He appears well-developed and well-nourished.   HENT:   Head: Normocephalic and atraumatic.   Right Ear: Tympanic membrane, external ear and ear canal normal.   Left Ear: Tympanic membrane, external ear and ear canal normal.   Nose: Rhinorrhea and congestion present. Right sinus exhibits no maxillary sinus tenderness and no frontal sinus tenderness. Left sinus exhibits no maxillary sinus tenderness and no frontal sinus tenderness.   Mouth/Throat: Oropharynx is clear and moist and mucous membranes are normal.   +maxillary pressure with deep palpation, but no pt reported pain   Eyes: Conjunctivae are normal.   Neck: Normal range of motion. Neck supple.   Cardiovascular: Normal rate and regular rhythm.   No murmur heard.  Pulmonary/Chest: Effort normal and breath sounds normal. He has no wheezes. He has no rales.   Lymphadenopathy:        Head (right side): No tonsillar adenopathy present.        Head (left side): No tonsillar adenopathy present.     He has no cervical adenopathy.        Right cervical: No superficial cervical and no posterior cervical adenopathy present.       Left cervical: No superficial cervical and no posterior cervical adenopathy present.   Psychiatric: He has a normal mood and affect. His behavior is normal.         Assessment/Plan   Ronn was seen today for sinus problem.    Diagnoses and all  orders for this visit:    Recurrent maxillary sinusitis  -     CT Sinus Without Contrast; Future      CT sinus for further investigation given 8 week history of sinus infection and hx recurrent sinus infections.  Advised to schedule with allergy even he does have sinus infection, as they would have valuable input on med recommendations as well. Pt will call- has office number.         Return if symptoms worsen or fail to improve.

## 2019-05-10 ENCOUNTER — HOSPITAL ENCOUNTER (OUTPATIENT)
Dept: CT IMAGING | Facility: HOSPITAL | Age: 29
Discharge: HOME | End: 2019-05-10
Admitting: PHYSICIAN ASSISTANT

## 2019-05-10 DIAGNOSIS — J01.01 RECURRENT MAXILLARY SINUSITIS: ICD-10-CM

## 2019-05-10 PROCEDURE — 70486 CT MAXILLOFACIAL W/O DYE: CPT

## 2019-07-08 ENCOUNTER — OFFICE VISIT (OUTPATIENT)
Dept: INTERNAL MEDICINE | Facility: CLINIC | Age: 29
End: 2019-07-08

## 2019-07-08 VITALS
SYSTOLIC BLOOD PRESSURE: 110 MMHG | TEMPERATURE: 98.6 F | HEART RATE: 68 BPM | OXYGEN SATURATION: 97 % | RESPIRATION RATE: 16 BRPM | DIASTOLIC BLOOD PRESSURE: 62 MMHG | WEIGHT: 159 LBS | BODY MASS INDEX: 25.55 KG/M2 | HEIGHT: 66 IN

## 2019-07-08 DIAGNOSIS — J06.9 ACUTE URI: Primary | ICD-10-CM

## 2019-07-08 PROCEDURE — 99213 OFFICE O/P EST LOW 20 MIN: CPT | Performed by: PHYSICIAN ASSISTANT

## 2019-07-08 RX ORDER — MONTELUKAST SODIUM 10 MG/1
TABLET ORAL
Refills: 1 | COMMUNITY
Start: 2019-06-10 | End: 2020-10-13

## 2019-07-08 NOTE — PROGRESS NOTES
Chief Complaint   Patient presents with   • Sinusitis     x2 days       Subjective       History of Present Illness     Ronn Lebron is a 28 y.o. male. He presents with 2 day history of congestion and sinus issues. Pt states he has had 2 days of increased congestion and PND. He does have sinus pressure at cheeks, but no pain. Mild intermittent HA as well. Pt denies fever, chills, ear pain, sore throat, cough, SOA, wheezing, abdominal pain, N/V/D. Pt states he was exposed to cigarette smoke in a confined space on Saturday, shortly before onset of symptoms. He is taking xyzal, singulair, and budesonide nasal spray as directed, prescribed by allergist. He has not tried any additional meds outside of routine Rx for allergies.     The following portions of the patient's history were reviewed and updated as appropriate: allergies, current medications, past medical history, past social history and problem list.    No Known Allergies  Social History     Tobacco Use   • Smoking status: Never Smoker   • Smokeless tobacco: Never Used   Substance Use Topics   • Alcohol use: No         Current Outpatient Medications:   •  budesonide (RINOCORT AQUA) 32 MCG/ACT nasal spray, 2 sprays into the nostril(s) as directed by provider Daily., Disp: , Rfl:   •  FLUoxetine (PROzac) 20 MG capsule, Take 1 capsule by mouth Daily., Disp: 30 capsule, Rfl: 2  •  montelukast (SINGULAIR) 10 MG tablet, , Disp: , Rfl: 1  •  ALLEGRA-D ALLERGY & CONGESTION 180-240 MG per 24 hr tablet, take 1 tablet by mouth once daily, Disp: 30 tablet, Rfl: 5  •  Azelastine-Fluticasone 137-50 MCG/ACT suspension, 1 spray into the nostril(s) as directed by provider Daily., Disp: 1 bottle, Rfl: 3    Review of Systems   Constitutional: Negative for chills and fever.   HENT: Positive for congestion and postnasal drip. Negative for ear pain and sore throat.         +sinus pressure   Eyes: Negative for pain.   Respiratory: Negative for cough and shortness of breath.     Cardiovascular: Negative for chest pain.   Gastrointestinal: Negative for abdominal pain, nausea and vomiting.   Neurological: Positive for headache. Negative for dizziness.       Objective   Vitals:    07/08/19 1307   BP: 110/62   Pulse: 68   Resp: 16   Temp: 98.6 °F (37 °C)   SpO2: 97%     Physical Exam   Constitutional: He appears well-developed and well-nourished.   HENT:   Head: Normocephalic and atraumatic.   Right Ear: Tympanic membrane, external ear and ear canal normal. No tenderness.   Left Ear: Tympanic membrane, external ear and ear canal normal. No tenderness.   Nose: Congestion present. Right sinus exhibits no maxillary sinus tenderness and no frontal sinus tenderness. Left sinus exhibits no maxillary sinus tenderness and no frontal sinus tenderness.   Mouth/Throat: Oropharynx is clear and moist and mucous membranes are normal. No oral lesions.   Eyes: Conjunctivae are normal. No scleral icterus.   Cardiovascular: Normal rate, regular rhythm and normal heart sounds.   Pulmonary/Chest: Effort normal and breath sounds normal. He has no wheezes. He has no rales. He exhibits no deformity.   Lymphadenopathy:        Head (right side): No submandibular and no tonsillar adenopathy present.        Head (left side): No submandibular and no tonsillar adenopathy present.     He has no cervical adenopathy.   Skin: No rash noted.   No atypical nevi.    Psychiatric: His behavior is normal.   Vitals reviewed.            Assessment/Plan   Ronn was seen today for sinusitis.    Diagnoses and all orders for this visit:    Acute URI    Advised sudafed in AM for 3-5 days in addition to his routine medications, as symptoms just began 2 days ago.  If symptoms persist, advised pt to call on Friday of this week to discuss Abx, as he is going out of town this weekend to [place] on vacation. Pt in agreement with plan.   Continue all routine meds.          Return if symptoms worsen or fail to improve.

## 2020-01-08 ENCOUNTER — OFFICE VISIT (OUTPATIENT)
Dept: INTERNAL MEDICINE | Facility: CLINIC | Age: 30
End: 2020-01-08

## 2020-01-08 VITALS
SYSTOLIC BLOOD PRESSURE: 122 MMHG | OXYGEN SATURATION: 95 % | TEMPERATURE: 97.8 F | BODY MASS INDEX: 24.4 KG/M2 | HEIGHT: 66 IN | WEIGHT: 151.8 LBS | HEART RATE: 79 BPM | RESPIRATION RATE: 18 BRPM | DIASTOLIC BLOOD PRESSURE: 80 MMHG

## 2020-01-08 DIAGNOSIS — M25.511 ACUTE PAIN OF RIGHT SHOULDER: Primary | ICD-10-CM

## 2020-01-08 PROCEDURE — 99213 OFFICE O/P EST LOW 20 MIN: CPT | Performed by: INTERNAL MEDICINE

## 2020-01-16 ENCOUNTER — TREATMENT (OUTPATIENT)
Dept: PHYSICAL THERAPY | Facility: CLINIC | Age: 30
End: 2020-01-16

## 2020-01-16 DIAGNOSIS — G89.29 CHRONIC RIGHT SHOULDER PAIN: Primary | ICD-10-CM

## 2020-01-16 DIAGNOSIS — M25.511 CHRONIC RIGHT SHOULDER PAIN: Primary | ICD-10-CM

## 2020-01-16 PROCEDURE — 97110 THERAPEUTIC EXERCISES: CPT | Performed by: PHYSICAL THERAPIST

## 2020-01-16 PROCEDURE — 97162 PT EVAL MOD COMPLEX 30 MIN: CPT | Performed by: PHYSICAL THERAPIST

## 2020-01-16 NOTE — PROGRESS NOTES
Physical Therapy Initial Evaluation and Plan of Care      Patient: Ronn Lebron   : 1990  Diagnosis/ICD-10 Code:  Chronic right shoulder pain [M25.511, G89.29]  Referring practitioner: Padmini Guerra MD    Subjective Evaluation    History of Present Illness  Mechanism of injury: Patient states he strained a rotator cuff muscle about a year ago which never fully got better, strained again last month during .             Patient Occupation: Works on [employer]  Current pain ratin  At best pain ratin  At worst pain ratin  Location: right posterior shoulder, under shoulder blade, into neck   Quality: dull ache, needle-like, pulling and tight  Relieving factors: rest and support  Aggravating factors: overhead activity, lifting and movement (shoulder across chest )    Hand dominance: right    Patient Goals  Patient goals for therapy: decreased edema, decreased pain, increased motion, return to sport/leisure activities, independence with ADLs/IADLs and increased strength             Objective       Cervical/Thoracic Screen   Cervical range of motion within normal limits    Neurological Testing     Sensation     Shoulder   Left Shoulder   Intact: light touch    Right Shoulder   Intact: light touch    Active Range of Motion   Left Shoulder   Internal rotation BTB: T5     Right Shoulder   Flexion: WFL  Abduction: WFL  External rotation BTH: WFL  Internal rotation BTB: T12   Horizontal adduction: Right shoulder active horizontal adduction: can feel tightness compared to left side  WFL    Scapular Mobility     Right Shoulder   Scapular Mobility with Shoulder to 90° FF   Scapular winging: minimal  Upward rotation: premature    Strength/Myotome Testing     Left Shoulder   Normal muscle strength    Right Shoulder     Planes of Motion   Flexion: 4+   Extension: 4   Abduction: 4+   External rotation at 0°: 4   External rotation at 45°: 4   Internal rotation at 0°: 4   Internal rotation at 45°: 4      Isolated Muscles   Serratus anterior: 4     Tests     Right Shoulder   Positive lift-off.          Assessment & Plan     Assessment  Impairments: abnormal coordination, abnormal muscle firing, abnormal muscle tone, abnormal or restricted ROM, activity intolerance, impaired physical strength, lacks appropriate home exercise program and pain with function  Assessment details: Patient is a 29 year old male presenting with acute left shoulder/ scapular pain after a force was placed on it posteriorly during jujitsu. Signs and symptoms are consistent with involvement of subscapularis, infraspinatus, and serratus anterior including tightness going into internal rotation and horizontal adduction and weakness of these muscles. No evidence of injury to glenohumeral joint or AC joint at this time. Patient is appropriate for physical therapy to address the above issues.   Prognosis: good  Prognosis details: Short Term Goals (2 weeks):  1. Patient will be independent with home exercise program.  2. Patient will demonstrate improved shoulder strength by 50%.  3. Patient will demonstrate improved left internal rotation by 50-75%.     Long Term Goals (6 weeks):  1. Patient will be able to lift at least 10 lbs overhead with shoulder pain no greater than 2/10.   2. Patient will demonstrate shoulder mobility of WFL.   3. Patient will be able to return to full work duty and recreational activity with shoulder pain no greater than 2/10.    Functional Limitations: carrying objects, lifting, pulling, pushing, uncomfortable because of pain and reaching behind back  Plan  Therapy options: will be seen for skilled physical therapy services  Planned modality interventions: ultrasound, traction, thermotherapy (hydrocollator packs), TENS, high voltage pulsed current (spasm management), high voltage pulsed current (pain management) and cryotherapy  Planned therapy interventions: therapeutic activities, stretching, strengthening, spinal/joint  mobilization, soft tissue mobilization, postural training, neuromuscular re-education, motor coordination training, manual therapy, abdominal trunk stabilization, ADL retraining, balance/weight-bearing training, body mechanics training, fine motor coordination training, flexibility, functional ROM exercises, home exercise program, IADL retraining and joint mobilization  Frequency: 1-2x/week.  Duration in visits: 6  Treatment plan discussed with: patient        Manual Therapy:         mins  32230;  Therapeutic Exercise:     39    mins  87374;     Neuromuscular Alexi:        mins  28465;    Therapeutic Activity:          mins  85750;     Gait Training:           mins  46731;     Ultrasound:          mins  03846;    Electrical Stimulation:         mins  05121 ( );  Dry Needling          mins self-pay    Timed Treatment:   39   mins   Total Treatment:     61   mins    PT SIGNATURE: Марина Lozada PT   DATE TREATMENT INITIATED: 1/17/2020    Initial Certification  Certification Period: 4/16/2020  I certify that the therapy services are furnished while this patient is under my care.  The services outlined above are required by this patient, and will be reviewed every 90 days.     PHYSICIAN: Padmini Guerra MD      DATE:     Please sign and return via fax to [phone].. Thank you, [facility].

## 2020-10-13 ENCOUNTER — OFFICE VISIT (OUTPATIENT)
Dept: FAMILY MEDICINE CLINIC | Facility: CLINIC | Age: 30
End: 2020-10-13

## 2020-10-13 VITALS
OXYGEN SATURATION: 98 % | HEIGHT: 66 IN | DIASTOLIC BLOOD PRESSURE: 70 MMHG | HEART RATE: 73 BPM | SYSTOLIC BLOOD PRESSURE: 110 MMHG | BODY MASS INDEX: 23.14 KG/M2 | WEIGHT: 144 LBS

## 2020-10-13 DIAGNOSIS — M25.562 ACUTE PAIN OF LEFT KNEE: Primary | ICD-10-CM

## 2020-10-13 PROCEDURE — 99213 OFFICE O/P EST LOW 20 MIN: CPT | Performed by: PHYSICIAN ASSISTANT

## 2020-10-13 NOTE — PROGRESS NOTES
Chief Complaint   Patient presents with   • Establish Care   • Knee Injury     left knee injury does ju jitsu , was getting better but now theres one spot that still giving issues       HPI     Ronn Lebron is a pleasant 30 y.o. male who presents for initial visit with me.  He reports intermittent, left proximal knee pain for 1 month. He does jujitsu but cannot recall a specific injury. His pain improved with rest, icing, and ibuprofen. Soreness returned yesterday after going on 10-minute walk. He denies a prior history of knee problems.     Past Medical History:   Diagnosis Date   • Anxiety        History reviewed. No pertinent surgical history.    History reviewed. No pertinent family history.    Social History     Socioeconomic History   • Marital status: Single     Spouse name: Not on file   • Number of children: Not on file   • Years of education: Not on file   • Highest education level: Not on file   Tobacco Use   • Smoking status: Never Smoker   • Smokeless tobacco: Never Used   Substance and Sexual Activity   • Alcohol use: No   • Drug use: Never   • Sexual activity: Defer       No Known Allergies    ROS    Review of Systems   Musculoskeletal: Positive for arthralgias. Negative for joint swelling.       Vitals:    10/13/20 1132   BP: 110/70   Pulse: 73   SpO2: 98%     Body mass index is 23.59 kg/m².      Current Outpatient Medications:   •  ALLEGRA-D ALLERGY & CONGESTION 180-240 MG per 24 hr tablet, take 1 tablet by mouth once daily, Disp: 30 tablet, Rfl: 5  •  Azelastine-Fluticasone 137-50 MCG/ACT suspension, 1 spray into the nostril(s) as directed by provider Daily., Disp: 1 bottle, Rfl: 3  •  budesonide (RINOCORT AQUA) 32 MCG/ACT nasal spray, 2 sprays into the nostril(s) as directed by provider Daily., Disp: , Rfl:   •  FLUoxetine (PROzac) 20 MG capsule, Take 1 capsule by mouth Daily., Disp: 30 capsule, Rfl: 2  •  montelukast (SINGULAIR) 10 MG tablet, , Disp: , Rfl: 1    PE    Physical  Exam  Constitutional:       General: He is not in acute distress.  Pulmonary:      Effort: Pulmonary effort is normal. No respiratory distress.   Musculoskeletal:      Left knee: He exhibits normal range of motion, no swelling, no ecchymosis, no deformity and normal meniscus. No tenderness found. No medial joint line, no lateral joint line, no MCL and no LCL tenderness noted.        Legs:    Neurological:      Mental Status: He is alert.      Motor: Motor function is intact. No weakness.   Psychiatric:         Mood and Affect: Mood normal.          A/P    Problem List Items Addressed This Visit     None      Visit Diagnoses     Acute pain of left knee    -  Primary  -Discussed possible hamstring strain or tendinitis reviewed. Defer imaging at this time with normal exam today and refer to physical therapy. If he continues to have persistent pain or worsening symptoms despite physical therapy, consider knee MRI and/or orthopedic referral  -Continue rest, icing/NSAIDs prn    Relevant Orders    Ambulatory Referral to Physical Therapy Evaluate and treat          Plan of care was reviewed with patient at the conclusion of today's visit. Education was provided regarding diagnoses, management, prescribed or recommended OTC products, and the importance of compliance with follow-up appointments. The patient was counseled regarding the risks, benefits, and possible side-effects of treatment. I advised the patient to keep me informed of any acute changes in their status including new, worsening, or persistent symptoms. Patient expresses understanding and agreement with the management plan.        JAMIE Matos

## 2020-10-20 ENCOUNTER — OFFICE VISIT (OUTPATIENT)
Dept: FAMILY MEDICINE CLINIC | Facility: CLINIC | Age: 30
End: 2020-10-20

## 2020-10-20 VITALS
DIASTOLIC BLOOD PRESSURE: 72 MMHG | BODY MASS INDEX: 22.66 KG/M2 | WEIGHT: 141 LBS | OXYGEN SATURATION: 97 % | SYSTOLIC BLOOD PRESSURE: 130 MMHG | HEIGHT: 66 IN | HEART RATE: 78 BPM

## 2020-10-20 DIAGNOSIS — J01.00 ACUTE NON-RECURRENT MAXILLARY SINUSITIS: Primary | ICD-10-CM

## 2020-10-20 PROCEDURE — 99213 OFFICE O/P EST LOW 20 MIN: CPT | Performed by: PHYSICIAN ASSISTANT

## 2020-10-20 RX ORDER — PREDNISONE 10 MG/1
20 TABLET ORAL DAILY
Qty: 8 TABLET | Refills: 0 | Status: SHIPPED | OUTPATIENT
Start: 2020-10-20 | End: 2020-10-24

## 2020-10-20 NOTE — PROGRESS NOTES
"    Chief Complaint   Patient presents with   • Sinus Problem     stuffy nose, sneezing, congestion, gets sinus infections when the weather changes       HPI     Ronn Lebron is a pleasant 30 y.o. male who presents for evaluation of \"chief complaint.\" This patient presents with a 1 day history of nasal congestion, white thick postnasal drip and rhinorrhea, raspy throat, swollen neck glands, and maxillary sinus pain.  His symptoms started overnight last night with weather changes. He states he usually gets a sinus infections 2-3 times/yr when the weather changes. Sudafed helped postnasal drip today. He denies fever, chills, body aches, loss of sense of smell, myalgias, diarrhea, and known sick contacts.    Past Medical History:   Diagnosis Date   • Anxiety        History reviewed. No pertinent surgical history.    History reviewed. No pertinent family history.    Social History     Socioeconomic History   • Marital status: Single     Spouse name: Not on file   • Number of children: Not on file   • Years of education: Not on file   • Highest education level: Not on file   Tobacco Use   • Smoking status: Never Smoker   • Smokeless tobacco: Never Used   Substance and Sexual Activity   • Alcohol use: No   • Drug use: Never   • Sexual activity: Defer       No Known Allergies    ROS    Review of Systems   Constitutional: Negative for chills and fever.   HENT: Positive for congestion and sore throat.    Respiratory: Positive for cough.    Musculoskeletal: Negative for myalgias.   Neurological: Negative for headache.       Vitals:    10/20/20 1447   BP: 130/72   Pulse: 78   SpO2: 97%     Body mass index is 23.1 kg/m².      Current Outpatient Medications:   •  predniSONE (DELTASONE) 10 MG tablet, Take 2 tablets by mouth Daily for 4 days., Disp: 8 tablet, Rfl: 0    PE    Physical Exam  Vitals signs reviewed.   Constitutional:       General: He is not in acute distress.     Appearance: He is well-developed.   HENT:      " Head: Normocephalic.      Right Ear: Ear canal normal. A middle ear effusion is present. Tympanic membrane is not erythematous.      Left Ear: Ear canal normal. A middle ear effusion is present. Tympanic membrane is not erythematous.      Ears:      Comments: Trace bilateral serous effusions     Nose: Mucosal edema and congestion present.      Right Sinus: No maxillary sinus tenderness or frontal sinus tenderness.      Left Sinus: No maxillary sinus tenderness or frontal sinus tenderness.      Mouth/Throat:      Pharynx: Uvula midline. No posterior oropharyngeal erythema.      Tonsils: No tonsillar exudate.   Eyes:      General:         Right eye: No discharge.         Left eye: No discharge.      Conjunctiva/sclera: Conjunctivae normal.   Neck:      Musculoskeletal: Normal range of motion and neck supple.   Cardiovascular:      Rate and Rhythm: Normal rate and regular rhythm.      Heart sounds: Normal heart sounds.   Pulmonary:      Effort: Pulmonary effort is normal. No respiratory distress.      Breath sounds: Normal breath sounds. No wheezing, rhonchi or rales.   Lymphadenopathy:      Cervical: No cervical adenopathy.      Upper Body:      Right upper body: No supraclavicular adenopathy.      Left upper body: No supraclavicular adenopathy.   Skin:     General: Skin is warm and dry.   Psychiatric:         Behavior: Behavior normal.          A/P    Problem List Items Addressed This Visit     None      Visit Diagnoses     Acute non-recurrent maxillary sinusitis    -  Primary  -Discussed most likely viral or allergic etiology and symptomatic management with Mucinex-D, nasal saline lavage, and other comfort measures. Will treat with prednisone for eustachian tube dysfunction and acute inflammation.  Advised patient to call the office if symptoms persist for more than 1 week or worsen          Plan of care was reviewed with patient at the conclusion of today's visit. Education was provided regarding diagnoses,  management, prescribed or recommended OTC products, and the importance of compliance with follow-up appointments. The patient was counseled regarding the risks, benefits, and possible side-effects of treatment. I advised the patient to keep me informed of any acute changes in their status including new, worsening, or persistent symptoms. Patient expresses understanding and agreement with the management plan.        JAMIE Matos

## 2020-10-20 NOTE — PATIENT INSTRUCTIONS
"· Attain adequate rest and increase clear fluid intake.   · Practice regular hand hygiene and cover your cough to help prevent spread of infection  · Use warm salt water gargles, lozenges, and hot tea with honey as needed for throat comfort.   · Use over-the-counter Delsym syrup as needed for coughing.   · Use Mucinex-D according to package instructions and \"ocean\" or \"simply saline\" brand sterile nasal spray twice daily.   · Use warm compresses over sinuses for comfort as needed  · Alternate taking Tylenol 500 mg and Ibuprofen 400 mg every 4 hours as needed for headache, body aches, throat pain, and/or fever reduction  · Please keep me informed of any acute changes in your status including new or worsening symptoms. Let me know if symptoms persist for longer than 1 week    "

## 2020-10-27 ENCOUNTER — TREATMENT (OUTPATIENT)
Dept: PHYSICAL THERAPY | Facility: CLINIC | Age: 30
End: 2020-10-27

## 2020-10-27 DIAGNOSIS — M76.899 QUADRICEPS TENDONITIS: Primary | ICD-10-CM

## 2020-10-27 PROCEDURE — 97161 PT EVAL LOW COMPLEX 20 MIN: CPT | Performed by: PHYSICAL THERAPIST

## 2020-10-27 PROCEDURE — 97110 THERAPEUTIC EXERCISES: CPT | Performed by: PHYSICAL THERAPIST

## 2020-10-27 NOTE — PROGRESS NOTES
Physical Therapy Initial Evaluation and Plan of Care      Patient: Ronn Lebron   : 1990  Diagnosis/ICD-10 Code:  Chronic right shoulder pain [M25.511, G89.29]  Referring practitioner: JAMIE Matos    Subjective Evaluation    History of Present Illness  Mechanism of injury: Patient states his left knee started bothering him in the beginning of September and feels like sports was making worse. Got better for a time and went back to sports and it got worse again. Mostly better at this time but feels a click of his quad tendon when bending. Some pain in patellar tendon. Knee feels unstable.     Does Jujitsu and weight lifting.       Patient Occupation: Off work currently.  Pain  Current pain rating: 3  At best pain ratin  At worst pain ratin  Location: left knee   Quality: tight  Aggravating factors: ambulation, squatting and standing    Patient Goals  Patient goals for therapy: decreased edema, decreased pain, increased motion, return to sport/leisure activities, independence with ADLs/IADLs, increased strength and improved balance             Objective          Tenderness   Left Knee   Tenderness in the patellar tendon and quadriceps tendon. No tenderness in the superior patella.     Active Range of Motion   Left Knee   Normal active range of motion  Flexion: Left knee active flexion: full range mildly uncomfortable      Patellar Mobility   Left Knee Patellar tendons within functional limits include the medial, lateral, superior and inferior.     Strength/Myotome Testing     Left Hip   Planes of Motion   Flexion: 4  Abduction: 4+    Left Knee   Flexion: 4+  Extension: 4+    Tests     Left Knee   Negative patellar compression and patella-femoral grind.     Additional Tests Details  Ligamentous structures normal.           Assessment & Plan     Assessment  Impairments: abnormal coordination, abnormal muscle firing, activity intolerance, impaired balance, impaired physical strength and lacks  appropriate home exercise program  Assessment details: Patient is a 30 year old male presenting with left knee pain since September. Patient practices [employer] and runs for exercise which was making knee pain worse. Patient presents with mild swelling over lateral distal quadriceps tendon, mild tenderness to patellar tendon, discomfort with end range knee flexion, and gross hip weakness and knee instability. Likely dealing with quadriceps tendinitis. Patient is appropriate for physical therapy for return to sport. He was given HEP focused on closed chain and eccentric movements and will follow up in 2 weeks.   Prognosis: good  Prognosis details: Short Term Goals (2 weeks):  1. Patient will be independent with home exercise program.  2. Patient will demonstrate improved hip strength by 50%.  3. Patient will demonstrate full knee mobility without discomfort.     Long Term Goals (4 weeks):  1. Patient will be able to run 1 mile without knee pain.   2. Patient will demonstrate functional squat without knee discomfort.  3. Patient will be able to return to full recreational activity without knee discomfort.       Functional Limitations: lifting, walking, uncomfortable because of pain and standing  Plan  Therapy options: will be seen for skilled physical therapy services  Planned modality interventions: ultrasound, thermotherapy (hydrocollator packs), TENS, high voltage pulsed current (spasm management), high voltage pulsed current (pain management) and cryotherapy  Planned therapy interventions: therapeutic activities, stretching, strengthening, spinal/joint mobilization, soft tissue mobilization, postural training, neuromuscular re-education, motor coordination training, manual therapy, abdominal trunk stabilization, ADL retraining, balance/weight-bearing training, joint mobilization, IADL retraining, home exercise program, gait training, functional ROM exercises, flexibility, body mechanics training and fine motor  coordination training  Frequency: 1-2x/week.  Duration in visits: 6  Treatment plan discussed with: patient        Access Code: [email or web address]   URL: [email or web address]   Date: 10/27/2020   Prepared by: Марина Lozada     Exercises  Supine Bridge - 10 reps - 3 sets - 1x daily - 7x weekly  Supine Active Straight Leg Raise - 10 reps - 3 sets - 1x daily - 7x weekly  Sidelying Hip Abduction - 10 reps - 3 sets - 1x daily - 7x weekly  Goblet Squat with Kettlebell - 10 reps - 2 sets - 1x daily - 4x weekly  Sumo Squat with Dumbbell - 10 reps - 2 sets - 1x daily - 4x weekly  Static Lunge - 10 reps - 2 sets - 1x daily - 4x weekly  Single Leg Deadlift with Kettlebell - 10 reps - 2 sets - 1x daily - 4x weekly    Manual Therapy:         mins  42736;  Therapeutic Exercise:    40     mins  29083;     Neuromuscular Alexi:        mins  17664;    Therapeutic Activity:          mins  69604;     Gait Training:           mins  43538;     Ultrasound:          mins  01949;    Electrical Stimulation:        mins  33983 ( );  Dry Needling          mins self-pay    Timed Treatment:  40    mins   Total Treatment:     58   mins    PT SIGNATURE: Марина Lozada, PT   DATE TREATMENT INITIATED: 10/28/2020    Initial Certification  Certification Period: 1/26/2021  I certify that the therapy services are furnished while this patient is under my care.  The services outlined above are required by this patient, and will be reviewed every 90 days.     PHYSICIAN: Pj Samuels PA      DATE:     Please sign and return via fax to [phone].. Thank you, [facility].

## 2020-11-05 ENCOUNTER — TREATMENT (OUTPATIENT)
Dept: PHYSICAL THERAPY | Facility: CLINIC | Age: 30
End: 2020-11-05

## 2020-11-05 DIAGNOSIS — M76.899 QUADRICEPS TENDONITIS: Primary | ICD-10-CM

## 2020-11-05 PROCEDURE — 97530 THERAPEUTIC ACTIVITIES: CPT | Performed by: PHYSICAL THERAPIST

## 2020-11-05 PROCEDURE — 97112 NEUROMUSCULAR REEDUCATION: CPT | Performed by: PHYSICAL THERAPIST

## 2020-11-10 NOTE — PROGRESS NOTES
Physical Therapy Daily Progress Note    Subjective   Ronn Lebron reports that he felt much better after his last visit for a few days, but the pain in the knee came back spontaneously in the middle of the night when he got up out of bed quickly.       Objective   See Exercise, Manual, and Modality Logs for complete treatment.       Assessment/Plan     Pt responded very well to treatment in the clinic today, jose cruz eccentric strengthening and dynamic stability activity. Will assess his response and progress as indicated.     Progress per Plan of Care and Progress strengthening /stabilization /functional activity           Manual Therapy:         mins  41811;  Therapeutic Exercise:         mins  77515;     Neuromuscular Alexi:    28    mins  14326;    Therapeutic Activity:     25     mins  16680;     Gait Training:           mins  55266;     Ultrasound:          mins  60641;    Electrical Stimulation:         mins  55960 ( );  E-Stim Attended:         mins  36969  Iontophoresis          mins 39256   Traction          mins  72473  Fluidotherapy          mins  09942  Dry Needling          mins self-pay - No Charge  Paraffin          mins  41385    Timed Treatment:   53   mins   Total Treatment:     53   mins    Fernie Lora, PT, DPT, OCS, Cert. DN  Physical Therapist

## 2020-11-12 ENCOUNTER — TREATMENT (OUTPATIENT)
Dept: PHYSICAL THERAPY | Facility: CLINIC | Age: 30
End: 2020-11-12

## 2020-11-12 DIAGNOSIS — M76.899 QUADRICEPS TENDONITIS: Primary | ICD-10-CM

## 2020-11-12 PROCEDURE — 97112 NEUROMUSCULAR REEDUCATION: CPT | Performed by: PHYSICAL THERAPIST

## 2020-11-12 NOTE — PROGRESS NOTES
Physical Therapy Daily Progress Note        Patient: Ronn Lebron   : 1990  Diagnosis/ICD-10 Code:  Quadriceps tendonitis [M76.899]  Referring practitioner: JAMIE Matos  Date of Initial Visit: Type: THERAPY  Noted: 10/27/2020  Today's Date: 2020  Patient seen for 3 sessions             Subjective   Ronn Lebron reports: still has some clicking in the knee but can tell it is improving and the quad is getting stronger.     Objective   See Exercise, Manual, and Modality Logs for complete treatment.       Assessment/Plan  Needed cues on knee valgus when fatigued at end of reps.   Progress per Plan of Care and Progress strengthening /stabilization /functional activity           Timed:  Manual Therapy:         mins  70943;  Therapeutic Exercise:         mins  37988;     Neuromuscular Alexi:  28      mins  87476;    Therapeutic Activity:          mins  86904;     Gait Training:           mins  01723;     Ultrasound:          mins  36981;    Electrical Stimulation:         mins  62168;  Iontophoresis          mins  50633    Untimed:  Electrical Stimulation:         mins  82377 ( );  Mechanical Traction:         mins  09609;   Fluidotherapy          mins  07420    Timed Treatment:   28   mins   Total Treatment:     45   mins        Elizabeth Tyler PTA  Physical Therapist Assistant

## 2020-11-19 ENCOUNTER — TREATMENT (OUTPATIENT)
Dept: PHYSICAL THERAPY | Facility: CLINIC | Age: 30
End: 2020-11-19

## 2020-11-19 DIAGNOSIS — M76.899 QUADRICEPS TENDONITIS: Primary | ICD-10-CM

## 2020-11-19 PROCEDURE — 97110 THERAPEUTIC EXERCISES: CPT | Performed by: PHYSICAL THERAPIST

## 2020-11-19 NOTE — PROGRESS NOTES
Physical Therapy Daily Progress Note        Patient: Ronn Lebron   : 1990  Diagnosis/ICD-10 Code:  Quadriceps tendonitis [M76.899]  Referring practitioner: JAMIE Matos  Date of Initial Visit: Type: THERAPY  Noted: 10/27/2020  Today's Date: 2020  Patient seen for 4 sessions             Subjective   Ronn Lebron reports: the popping is lessoning along with the pain. He has been feeling a lot stronger and excited to get back to what he loves to do.     Objective   See Exercise, Manual, and Modality Logs for complete treatment.       Assessment/Plan  Continue to work on the eccentric quad strengthening to fatigue. Pt is challenged with exercises at this time but will be progressed when needed.   Progress per Plan of Care and Progress strengthening /stabilization /functional activity           Timed:  Manual Therapy:         mins  85544;  Therapeutic Exercise:    30     mins  50949;     Neuromuscular Alexi:        mins  26354;    Therapeutic Activity:          mins  17542;     Gait Training:           mins  07180;     Ultrasound:          mins  20208;    Electrical Stimulation:         mins  17181;  Iontophoresis          mins  67128    Untimed:  Electrical Stimulation:         mins  09077 ( );  Mechanical Traction:         mins  09230;   Fluidotherapy          mins  60801    Timed Treatment:   30   mins   Total Treatment:     60   mins        Elizabeth Tyler PTA  Physical Therapist Assistant

## 2020-12-10 ENCOUNTER — TELEPHONE (OUTPATIENT)
Dept: ORTHOPEDICS | Facility: OTHER | Age: 30
End: 2020-12-10

## 2021-02-08 ENCOUNTER — OFFICE VISIT (OUTPATIENT)
Dept: FAMILY MEDICINE CLINIC | Facility: CLINIC | Age: 31
End: 2021-02-08

## 2021-02-08 VITALS
OXYGEN SATURATION: 96 % | HEIGHT: 66 IN | WEIGHT: 150.8 LBS | HEART RATE: 80 BPM | SYSTOLIC BLOOD PRESSURE: 126 MMHG | BODY MASS INDEX: 24.23 KG/M2 | DIASTOLIC BLOOD PRESSURE: 62 MMHG

## 2021-02-08 DIAGNOSIS — M25.562 MECHANICAL KNEE PAIN, LEFT: ICD-10-CM

## 2021-02-08 DIAGNOSIS — Z11.3 ENCOUNTER FOR SCREENING EXAMINATION FOR SEXUALLY TRANSMITTED DISEASE: Primary | ICD-10-CM

## 2021-02-08 DIAGNOSIS — Z20.2 EXPOSURE TO GONORRHEA: ICD-10-CM

## 2021-02-08 PROCEDURE — 96372 THER/PROPH/DIAG INJ SC/IM: CPT | Performed by: NURSE PRACTITIONER

## 2021-02-08 PROCEDURE — 99214 OFFICE O/P EST MOD 30 MIN: CPT | Performed by: NURSE PRACTITIONER

## 2021-02-08 RX ORDER — CEFTRIAXONE 500 MG/1
500 INJECTION, POWDER, FOR SOLUTION INTRAMUSCULAR; INTRAVENOUS ONCE
Status: COMPLETED | OUTPATIENT
Start: 2021-02-08 | End: 2021-02-08

## 2021-02-08 RX ADMIN — CEFTRIAXONE 500 MG: 500 INJECTION, POWDER, FOR SOLUTION INTRAMUSCULAR; INTRAVENOUS at 14:45

## 2021-02-08 NOTE — PATIENT INSTRUCTIONS
Acute Knee Pain, Adult  Many things can cause knee pain. Sometimes, knee pain is sudden (acute) and may be caused by damage, swelling, or irritation of the muscles and tissues that support your knee.  The pain often goes away on its own with time and rest. If the pain does not go away, tests may be done to find out what is causing the pain.  Follow these instructions at home:  Pay attention to any changes in your symptoms. Take these actions to relieve your pain.  If you have a knee sleeve or brace:    · Wear the sleeve or brace as told by your doctor. Remove it only as told by your doctor.  · Loosen the sleeve or brace if your toes:  ? Tingle.  ? Become numb.  ? Turn cold and blue.  · Keep the sleeve or brace clean.  · If the sleeve or brace is not waterproof:  ? Do not let it get wet.  ? Cover it with a watertight covering when you take a bath or shower.  Activity  · Rest your knee.  · Do not do things that cause pain.  · Avoid activities where both feet leave the ground at the same time (high-impact activities). Examples are running, jumping rope, and doing jumping jacks.  · Work with a physical therapist to make a safe exercise program, as told by your doctor.  Managing pain, stiffness, and swelling    · If told, put ice on the knee:  ? Put ice in a plastic bag.  ? Place a towel between your skin and the bag.  ? Leave the ice on for 20 minutes, 2-3 times a day.  · If told, put pressure (compression) on your injured knee to control swelling, give support, and help with discomfort. Compression may be done with an elastic bandage.  General instructions  · Take all medicines only as told by your doctor.  · Raise (elevate) your knee while you are sitting or lying down. Make sure your knee is higher than your heart.  · Sleep with a pillow under your knee.  · Do not use any products that contain nicotine or tobacco. These include cigarettes, e-cigarettes, and chewing tobacco. These products may slow down healing. If  you need help quitting, ask your doctor.  · If you are overweight, work with your doctor and a food expert (dietitian) to set goals to lose weight. Being overweight can make your knee hurt more.  · Keep all follow-up visits as told by your doctor. This is important.  Contact a doctor if:  · The knee pain does not stop.  · The knee pain changes or gets worse.  · You have a fever along with knee pain.  · Your knee feels warm when you touch it.  · Your knee gives out or locks up.  Get help right away if:  · Your knee swells, and the swelling gets worse.  · You cannot move your knee.  · You have very bad knee pain.  Summary  · Many things can cause knee pain. The pain often goes away on its own with time and rest.  · Your doctor may do tests to find out the cause of the pain.  · Pay attention to any changes in your symptoms. Relieve your pain with rest, medicines, light activity, and use of ice.  · Get help right away if you cannot move your knee or your knee pain is very bad.  This information is not intended to replace advice given to you by your health care provider. Make sure you discuss any questions you have with your health care provider.  Document Revised: 05/30/2019 Document Reviewed: 05/30/2019  [employer] Patient Education © 2020 [employer]    Knee Sprain    A knee sprain is a stretch or tear in a knee ligament. Knee ligaments are bands of tissue that connect bones in the knee to each other.  Follow these instructions at home:  If you have a splint or brace:  · Wear the splint or brace as told by your doctor. Remove it only as told by your doctor.  · Loosen the splint or brace if your toes tingle, get numb, or turn cold and blue.  · Keep the splint or brace clean.  · If the splint or brace is not waterproof:  ? Do not let it get wet.  ? Cover it with a watertight covering when you take a bath or a shower.  If you have a cast:  · Do not stick anything inside the cast to scratch your skin.  · Check the skin  around the cast every day. Tell your doctor about any concerns.  · You may put lotion on dry skin around the edges of the cast. Do not put lotion on the skin underneath the cast.  · Keep the cast clean.  · If the cast is not waterproof:  ? Do not let it get wet.  ? Cover it with a watertight covering when you take a bath or a shower.  Managing pain, stiffness, and swelling    · Gently move your toes often to avoid stiffness and to lessen swelling.  · Raise (elevate) the injured area above the level of your heart while you are sitting or lying down.  · Take over-the-counter and prescription medicines only as told by your doctor.  · If directed, put ice on the injured area.  ? If you have a removable splint or brace, remove it as told by your doctor.  ? Put ice in a plastic bag.  ? Place a towel between your skin and the bag or between your cast and the bag.  ? Leave the ice on for 20 minutes, 2-3 times a day.  General instructions  · Do exercises as told by your doctor.  · Keep all follow-up visits as told by your doctor. This is important.  Contact a doctor if:  · You have pain that gets worse.  · The cast, brace, or splint does not fit right.  · The cast, brace, or splint gets damaged.  Get help right away if:  · You cannot lean on your knee to stand or walk.  · You cannot move the injured area.  · You knee merry or you have pain after you walk only a few steps.  · You have very bad pain, swelling, or numbness below the cast, brace, or splint.  Summary  · A knee sprain is a stretch or tear in a band (ligament) that connects your knee bones to each other.  · You may need to wear a splint, brace, or cast to help your knee get better.  · Contact your doctor if you have very bad pain, swelling, or numbness, or if you cannot walk.  This information is not intended to replace advice given to you by your health care provider. Make sure you discuss any questions you have with your health care provider.  Document Revised:  04/10/2020 Document Reviewed: 2017  Elsevier Patient Education ©  [employer]    Gonorrhea  Gonorrhea is a sexually transmitted disease (STD) that can affect both men and women. If left untreated, this infection can:  · Damage the female or male organs.  · Cause women and men to be unable to have children (be sterile).  · Harm a fetus if an infected woman is pregnant.  It is important to get treatment for gonorrhea as soon as possible. It is also necessary for all of your sexual partners to be tested for the infection.  What are the causes?  This condition is caused by bacteria called Neisseria gonorrhoeae. The infection is spread from person to person through sexual contact, including oral, anal, and vaginal sex. A  can contract the infection from his or her mother during birth.  What increases the risk?  The following factors may make you more likely to develop this condition:  · Being a woman who is younger than 25 years of age and who is sexually active.  · Being a woman 25 years of age or older who has:  ? A new sex partner.  ? More than one sex partner.  ? A sex partner who has an STD.  · Being a man who has:  ? A new sex partner.  ? More than one sex partner.  ? A sex partner who has an STD.  · Using condoms inconsistently.  · Currently having, or having previously had, an STD.  · Exchanging sex for money or drugs.  What are the signs or symptoms?  Some people do not have any symptoms. If you do have symptoms, they may be different for females and males.  For females  · Pain in the lower abdomen.  · Abnormal vaginal discharge. The discharge may be cloudy, thick, or yellow-green in color.  · Bleeding between periods.  · Painful sex.  · Burning or itching in and around the vagina.  · Pain or burning when urinating.  · Irritation, pain, bleeding, or discharge from the rectum. This may occur if the infection was spread by anal sex.  · Sore throat or swollen lymph nodes in the neck. This may  occur if the infection was spread by oral sex.  For males  · Abnormal discharge from the penis. This discharge may be cloudy, thick, or yellow-green in color.  · Pain or burning during urination.  · Pain or swelling in the testicles.  · Irritation, pain, bleeding, or discharge from the rectum. This may occur if the infection was spread by anal sex.  · Sore throat, fever, or swollen lymph nodes in the neck. This may occur if the infection was spread by oral sex.  How is this diagnosed?  This condition is diagnosed based on:  · A physical exam.  · A sample of discharge that is examined under a microscope to look for the bacteria. The discharge may be taken from the urethra, cervix, throat, or rectum.  · Urine tests.  Not all of test results will be available during your visit.  How is this treated?  This condition is treated with antibiotic medicines. It is important for treatment to begin as soon as possible. Early treatment may prevent some problems from developing. Do not have sex during treatment. Avoid all types of sexual activity for 7 days after treatment is complete and until any sex partners have been treated.  Follow these instructions at home:  · Take over-the-counter and prescription medicines only as told by your health care provider.  · Take your antibiotic medicine as told by your health care provider. Do not stop taking the antibiotic even if you start to feel better.  · Do not have sex until at least 7 days after you and your partner(s) have finished treatment and your health care provider says it is okay.  · It is your responsibility to get your test results. Ask your health care provider, or the department performing the test, when your results will be ready.  · If you test positive for gonorrhea, inform your recent sexual partners. This includes any oral, anal, or vaginal sex partners. They need to be checked for gonorrhea even if they do not have symptoms. They may need treatment, even if they  test negative for gonorrhea.  · Keep all follow-up visits as told by your health care provider. This is important.  How is this prevented?    · Use latex condoms correctly every time you have sexual intercourse.  · Ask if your sexual partner has been tested for STDs and had negative results.  · Avoid having multiple sexual partners.  Contact a health care provider if:  · You develop a bad reaction to the medicine you were prescribed. This may include:  ? A rash.  ? Nausea.  ? Vomiting.  ? Diarrhea.  · Your symptoms do not get better after a few days of taking antibiotics.  · Your symptoms get worse.  · You develop new symptoms.  · Your pain gets worse.  · You have a fever.  · You develop pain, itching, or discharge around the eyes.  Get help right away if:  · You feel dizzy or faint.  · You have trouble breathing or have shortness of breath.  · You develop an irregular heartbeat.  · You have severe abdominal pain with or without shoulder pain.  · You develop any bumps or sores (lesions) on your skin.  · You develop warmth, redness, pain, or swelling around your joints, such as the knee.  Summary  · Gonorrhea is an STD that can affect both men and women.  · This condition is caused by bacteria called Neisseria gonorrhoeae. The infection is spread from person to person, usually through sexual contact, including oral, anal, and vaginal sex.  · Symptoms vary between males and females. Generally, they include abnormal discharge and burning during urination. Women may also experience painful sex, itching around the vagina, and bleeding between menstrual periods. Men may also experience swelling of the testicles.  · This condition is treated with antibiotic medicines. Do not have sex until at least 7 days after completing antibiotic treatment.  · If left untreated, gonorrhea can have serious side effects and complications.  This information is not intended to replace advice given to you by your health care provider. Make  sure you discuss any questions you have with your health care provider.  Document Revised: 05/12/2020 Document Reviewed: 11/17/2017  Elsevier Patient Education © 2020 Elsevier Inc.    Safe Sex  Practicing safe sex means taking steps before and during sex to reduce your risk of:  · Getting an STI (sexually transmitted infection).  · Giving your partner an STI.  · Unwanted or unplanned pregnancy.  How can I practice safe sex?         Ways you can practice safe sex  · Limit your sexual partners to only one partner who is having sex with only you.  · Avoid using alcohol and drugs before having sex. Alcohol and drugs can affect your judgment.  · Before having sex with a new partner:  ? Talk to your partner about past partners, past STIs, and drug use.  ? Get screened for STIs and discuss the results with your partner. Ask your partner to get screened, too.  · Check your body regularly for sores, blisters, rashes, or unusual discharge. If you notice any of these problems, visit your health care provider.  · Avoid sexual contact if you have symptoms of an infection or you are being treated for an STI.  · While having sex, use a condom. Make sure to:  ? Use a condom every time you have vaginal, oral, or anal sex. Both females and males should wear condoms during oral sex.  ? Keep condoms in place from the beginning to the end of sexual activity.  ? Use a latex condom, if possible. Latex condoms offer the best protection.  ? Use only water-based lubricants with a condom. Using petroleum-based lubricants or oils will weaken the condom and increase the chance that it will break.  Ways your health care provider can help you practice safe sex  · See your health care provider for regular screenings, exams, and tests for STIs.  · Talk with your health care provider about what kind of birth control (contraception) is best for you.  · Get vaccinated against hepatitis B and human papillomavirus (HPV).  · If you are at risk of being  infected with HIV (human immunodeficiency virus), talk with your health care provider about taking a prescription medicine to prevent HIV infection. You are at risk for HIV if you:  ? Are a man who has sex with other men.  ? Are sexually active with more than one partner.  ? Take drugs by injection.  ? Have a sex partner who has HIV.  ? Have unprotected sex.  ? Have sex with someone who has sex with both men and women.  ? Have had an STI.  Follow these instructions at home:  · Take over-the-counter and prescription medicines as told by your health care provider.  · Keep all follow-up visits as told by your health care provider. This is important.  Where to find more information  · Centers for Disease Control and Prevention: https://www.cdc.gov/std/prevention/default.htm  · Planned Parenthood: https://www.plannedparenthood.org/  · Office on Women's Health: https://www.womenshealth.gov/a-z-topics/sexually-transmitted-infections  Summary  · Practicing safe sex means taking steps before and during sex to reduce your risk of STIs, giving your partner STIs, and having an unwanted or unplanned pregnancy.  · Before having sex with a new partner, talk to your partner about past partners, past STIs, and drug use.  · Use a condom every time you have vaginal, oral, or anal sex. Both females and males should wear condoms during oral sex.  · Check your body regularly for sores, blisters, rashes, or unusual discharge. If you notice any of these problems, visit your health care provider.  · See your health care provider for regular screenings, exams, and tests for STIs.  This information is not intended to replace advice given to you by your health care provider. Make sure you discuss any questions you have with your health care provider.  Document Revised: 04/10/2020 Document Reviewed: 09/30/2019  Elsevier Patient Education © 2020 Elsevier Inc.

## 2021-02-08 NOTE — PROGRESS NOTES
Subjective   Ronn Lebron is a 30 y.o. male.   Chief Complaint   Patient presents with   • Exposure to STD     partner informed him she tested positive for gonnorhea   • Knee Pain     pt stated he thinks he sprained his knee sometime last week.         History of Present Illness   Patient is here to have STI testing, states he had sex with a girl a month ago who contacted him and said she had tested positive for gonorrhea. States he used a condom, denies any symptoms,   Also complaint of left knee pain x 1 week, noticed stiffness after rock climbing, iced for first couple of days. Had 4-5 sessions of PT about 3-4 months ago for a similar strain. Has been doing same exercises and seems to be helping.   The following portions of the patient's history were reviewed and updated as appropriate: allergies, current medications, past family history, past medical history, past social history, past surgical history and problem list.    Review of Systems   Constitutional: Negative for chills, fatigue and fever.   Genitourinary: Negative for difficulty urinating, discharge, dysuria and penile pain.   Musculoskeletal: Positive for arthralgias.       Objective   Physical Exam  Vitals signs reviewed.   Constitutional:       General: He is not in acute distress.     Appearance: Normal appearance. He is not ill-appearing, toxic-appearing or diaphoretic.   HENT:      Head: Normocephalic and atraumatic.   Cardiovascular:      Rate and Rhythm: Normal rate.      Pulses: Normal pulses.   Pulmonary:      Effort: No respiratory distress.      Breath sounds: Normal breath sounds.   Musculoskeletal:         General: No tenderness, deformity or signs of injury.      Right lower leg: No edema.      Left lower leg: No edema.      Comments: Normal left knee ROM and strength; pain occurs with squatting   Skin:     General: Skin is warm and dry.      Findings: No erythema.   Psychiatric:         Mood and Affect: Mood normal.         Thought  "Content: Thought content normal.       /62   Pulse 80   Ht 166.4 cm (65.51\")   Wt 68.4 kg (150 lb 12.8 oz)   SpO2 96%   BMI 24.70 kg/m²     Assessment/Plan   Diagnoses and all orders for this visit:    1. Encounter for screening examination for sexually transmitted disease (Primary)  -     Chlamydia trachomatis, Neisseria gonorrhoeae, PCR - Urine, Urine, Clean Catch; Future    2. Exposure to gonorrhea  -     cefTRIAXone (ROCEPHIN) injection 500 mg    3. Mechanical knee pain, left        Urine for STI testing sent to [facility], 500 mg Rocephin per protocol given due to known exposure to gonorrhea. Safe sex practices with condoms encouraged, written information provided in MyChart  I will contact patient regarding test results and provide instructions regarding any necessary changes in plan of care.  Avoid over use of knee, use rest, ice, ace wrap, elevation, continue exercises provided by PT . Patient will notify me if he wants order for physical therapy  Patient was encouraged to keep me informed of any acute changes, lack of improvement, or any new concerning symptoms.       "

## 2021-02-17 DIAGNOSIS — Z11.3 ENCOUNTER FOR SCREENING EXAMINATION FOR SEXUALLY TRANSMITTED DISEASE: ICD-10-CM

## 2021-02-22 ENCOUNTER — TELEPHONE (OUTPATIENT)
Dept: FAMILY MEDICINE CLINIC | Facility: CLINIC | Age: 31
End: 2021-02-22

## 2021-02-22 NOTE — TELEPHONE ENCOUNTER
His last rx was from 2019 for 30 days with 2 rf. I haven't filled this for him in the past. He saw Barb a couple of weeks ago but I don't see anything in his note about refilling Prozac. Will forward to her to see if this was discussed. If not, recommend visit to review resuming.

## 2021-02-22 NOTE — TELEPHONE ENCOUNTER
Caller: Ronn Lebron    Relationship: Self    Best call back number: [phone]    What medication are you requesting: GENERIC FOR PROZAC    What are your current symptoms: ANTI-DEPRESSANT.    How long have you been experiencing symptoms:     Have you had these symptoms before:    [x] Yes  [] No    Have you been treated for these symptoms before:   [x] Yes  [] No    If a prescription is needed, what is your preferred pharmacy and phone number: [id number] - [facility] [address] AT [facility] [phone] [facility] [phone]      Additional notes: Patient stated that he was asked at his last appointment if he wanted a refill.  He has decided that he does

## 2021-03-01 ENCOUNTER — OFFICE VISIT (OUTPATIENT)
Dept: FAMILY MEDICINE CLINIC | Facility: CLINIC | Age: 31
End: 2021-03-01

## 2021-03-01 VITALS
BODY MASS INDEX: 25.12 KG/M2 | WEIGHT: 156.3 LBS | HEART RATE: 82 BPM | DIASTOLIC BLOOD PRESSURE: 68 MMHG | OXYGEN SATURATION: 98 % | SYSTOLIC BLOOD PRESSURE: 118 MMHG | HEIGHT: 66 IN

## 2021-03-01 DIAGNOSIS — Z00.00 ROUTINE MEDICAL EXAM: ICD-10-CM

## 2021-03-01 DIAGNOSIS — F32.9 REACTIVE DEPRESSION: Primary | ICD-10-CM

## 2021-03-01 PROCEDURE — 99214 OFFICE O/P EST MOD 30 MIN: CPT | Performed by: PHYSICIAN ASSISTANT

## 2021-03-01 RX ORDER — FLUOXETINE HYDROCHLORIDE 20 MG/1
20 CAPSULE ORAL DAILY
Qty: 30 CAPSULE | Refills: 5 | Status: SHIPPED | OUTPATIENT
Start: 2021-03-01 | End: 2021-03-05 | Stop reason: SINTOL

## 2021-03-01 NOTE — PATIENT INSTRUCTIONS
-Go to the [facility] diagnostic [place] lab located at [address] [place] [address] [facility] for fasting blood work. They are open from 8 AM to 4:15 PM Monday through Friday. You do not need an appointment

## 2021-03-01 NOTE — PROGRESS NOTES
"    Chief Complaint   Patient presents with   • Med Refill     Pt states he used to take Prozac and it helped him a lot and he is wanting to start takling it again        HPI     Ronn Lebron is a pleasant 30 y.o. male who presents for evaluation of \"chief complaint.\"   He c/o of reduced motivation, feeling down for some time but worse in the last 3 weeks.  He admits to increased stress including family trauma, at best friend and therapist moving away.  Feels not seeing friends and isolation is contributing.  He does admit to fleeting thoughts of self-harm but \"nothing serious.\" He used to take Prozac which was beneficial but has been off of medication for about 3 years. Tolerated well aside from some appetite changes. Increased family drama, therapist is moving in a few months. Has been seeing therapist for last 5 months and plans to establish with someone new. He does have recommendations from his previous psychologist. He does [place] and runs regularly for exercise. Denies drug or alcohol use.      Past Medical History:   Diagnosis Date   • Anxiety        History reviewed. No pertinent surgical history.    History reviewed. No pertinent family history.    Social History     Socioeconomic History   • Marital status: Single     Spouse name: Not on file   • Number of children: Not on file   • Years of education: Not on file   • Highest education level: Not on file   Tobacco Use   • Smoking status: Never Smoker   • Smokeless tobacco: Never Used   Substance and Sexual Activity   • Alcohol use: No   • Drug use: Never   • Sexual activity: Defer       No Known Allergies    ROS    Review of Systems   Psychiatric/Behavioral: Positive for suicidal ideas, depressed mood and stress.       Vitals:    03/01/21 1457   BP: 118/68   Pulse: 82   SpO2: 98%     Body mass index is 25.6 kg/m².      Current Outpatient Medications:   •  FLUoxetine (PROzac) 20 MG capsule, Take 1 capsule by mouth Daily., Disp: 30 capsule, Rfl: " 5    PE    Physical Exam  Vitals signs reviewed.   Constitutional:       General: He is not in acute distress.  Pulmonary:      Effort: Pulmonary effort is normal. No respiratory distress.   Neurological:      Mental Status: He is alert.   Psychiatric:         Mood and Affect: Mood normal.          A/P    Problem List Items Addressed This Visit        Mental Health    Reactive depression - Primary  -Patient does have occasional suicidal ideation but denies a plan and states he would not take action on these thoughts. We discussed if his symptoms worsen or if he believes he is a harm to himself, he should present to the ER for evaluation. Restart Prozac 20 mg daily which has been effective in the past. RTC in 1 month or sooner if needed   -Recommended establishing with a new counselor  -Order TFTs    Relevant Medications    FLUoxetine (PROzac) 20 MG capsule      Other Visit Diagnoses     Routine medical exam        Relevant Orders    CBC & Differential    Comprehensive Metabolic Panel    TSH Rfx On Abnormal To Free T4          Plan of care was reviewed with patient at the conclusion of today's visit. Education was provided regarding diagnoses, management, prescribed or recommended OTC products, and the importance of compliance with follow-up appointments. The patient was counseled regarding the risks, benefits, and possible side-effects of treatment. I advised the patient to keep me informed of any acute changes in their status including new, worsening, or persistent symptoms. Patient expresses understanding and agreement with the management plan.        JAMIE Matos

## 2021-03-05 ENCOUNTER — TELEPHONE (OUTPATIENT)
Dept: FAMILY MEDICINE CLINIC | Facility: CLINIC | Age: 31
End: 2021-03-05

## 2021-03-05 RX ORDER — BUPROPION HYDROCHLORIDE 150 MG/1
150 TABLET ORAL EVERY MORNING
Qty: 30 TABLET | Refills: 1 | Status: SHIPPED | OUTPATIENT
Start: 2021-03-05 | End: 2021-04-07 | Stop reason: SDUPTHER

## 2021-03-05 NOTE — TELEPHONE ENCOUNTER
Caller: Ronn Lebron    Relationship: Self    Best call back number:     [phone]     What medications are you currently taking:   Current Outpatient Medications on File Prior to Visit   Medication Sig Dispense Refill   • FLUoxetine (PROzac) 20 MG capsule Take 1 capsule by mouth Daily. 30 capsule 5     No current facility-administered medications on file prior to visit.         When did you start taking these medications:     Monday, 3/1/21    Which medication are you concerned about:     THE MEDICATION LISTED ABOVE - PROZAC    Who prescribed you this medication:     LEONARDO GOMEZ    What are your concerns:     AFTER (24) HOURS PATIENT BECAME SICK, EXPERIENCED DIARRHEA, AND COULDN'T EAT     PATIENT STOPPED TAKING THE MEDICATION AND ALMOST IMMEDIATELY STARTED TO FEEL BETTER    PATIENT REQUESTS A CALL BACK AND TO BE ADVISED ON HOW TO PROCEED    PREFERRED PHARMACY:    [facility] - [place], [place]    TELEPHONE CONTACT:    [phone]    FAX:    [phone]    LEONARDO AYALA

## 2021-03-05 NOTE — TELEPHONE ENCOUNTER
Patient was informed of the change in medication. He stated after not taking the prozac yesterday and today he feels much better. He will trial this wellbutrin and let us know how he tolerates it. I have d/c the prozac from his list and put it on his allergies list as an intolerance.   The patient was appreciative for our help.

## 2021-03-05 NOTE — TELEPHONE ENCOUNTER
Recommend staying off of fluoxetine.I will send in Wellbutrin 150 mg for him to start once daily instead. He can start this once his side effects resolved.

## 2021-04-01 ENCOUNTER — TELEPHONE (OUTPATIENT)
Dept: FAMILY MEDICINE CLINIC | Facility: CLINIC | Age: 31
End: 2021-04-01

## 2021-04-01 NOTE — TELEPHONE ENCOUNTER
Caller: Ronn Lebron A    Relationship to patient: Self    Best call back number: [phone]    Date of exposure: NA    Date of positive COVID19 test: NA    Date if possible COVID19 exposure: NA    COVID19 symptoms: NA    Date of initial quarantine: NA    Additional information or concerns: PATIENT TOOK HIS FIRST SHOT FOR COVD YESTERDAY, HE SAID HE HAS A HEADACHE,  SORE ARM, CHILLS, NAUSEA AND WANTED TO KNOW WHAT HE SHOULD DO; HE ALSO HAS A PIMPLE INSIDE HIS NOSE AND ITS CAUSING HIS FACE TO SWELL..HE DIDN'T KNOW IF HE SHOULD TAKE IBUPROFEN FOR THIS; PLEASE CALL TO ADVISE

## 2021-04-01 NOTE — TELEPHONE ENCOUNTER
Recommend office visit for the nasal concern. It's OK for him to take Tylenol 1,000 mg tid prn for his vaccine side-effects. This should resolve in a few days. If he has worsening symptoms, he should be seen.

## 2021-04-07 ENCOUNTER — OFFICE VISIT (OUTPATIENT)
Dept: FAMILY MEDICINE CLINIC | Facility: CLINIC | Age: 31
End: 2021-04-07

## 2021-04-07 VITALS
HEIGHT: 66 IN | SYSTOLIC BLOOD PRESSURE: 122 MMHG | WEIGHT: 151.4 LBS | DIASTOLIC BLOOD PRESSURE: 76 MMHG | HEART RATE: 85 BPM | BODY MASS INDEX: 24.33 KG/M2 | OXYGEN SATURATION: 98 %

## 2021-04-07 DIAGNOSIS — Z91.09 ENVIRONMENTAL ALLERGIES: ICD-10-CM

## 2021-04-07 DIAGNOSIS — J01.90 ACUTE NON-RECURRENT SINUSITIS, UNSPECIFIED LOCATION: ICD-10-CM

## 2021-04-07 DIAGNOSIS — J34.0 NASAL ABSCESS: Primary | ICD-10-CM

## 2021-04-07 DIAGNOSIS — F32.9 REACTIVE DEPRESSION: ICD-10-CM

## 2021-04-07 PROCEDURE — 99214 OFFICE O/P EST MOD 30 MIN: CPT | Performed by: PHYSICIAN ASSISTANT

## 2021-04-07 RX ORDER — FLUTICASONE PROPIONATE 50 MCG
SPRAY, SUSPENSION (ML) NASAL
COMMUNITY
Start: 2021-04-04

## 2021-04-07 RX ORDER — PREDNISONE 20 MG/1
TABLET ORAL
COMMUNITY
Start: 2021-04-04 | End: 2021-04-13

## 2021-04-07 RX ORDER — CEPHALEXIN 500 MG/1
500 CAPSULE ORAL 4 TIMES DAILY
COMMUNITY
Start: 2021-04-02 | End: 2021-05-07

## 2021-04-07 RX ORDER — BUPROPION HYDROCHLORIDE 300 MG/1
300 TABLET ORAL EVERY MORNING
Qty: 30 TABLET | Refills: 1 | Status: SHIPPED | OUTPATIENT
Start: 2021-04-07 | End: 2021-05-07

## 2021-04-07 NOTE — PROGRESS NOTES
Chief Complaint   Patient presents with   • Depression     6 week f/u   • Abscess     Pt stated started last friday. went to [facility] and was given antibiotics and has helped a lot , but wanted to check and see if everything is healing well.       HPI     Ronn Lebron is a 30 y.o. male who is here for follow-up of right nasal abscess. States he went to [facility] on 4/2 and was started on keflex, mupirocin ointment, and flonase for a right nasal infection and sinusitis. She reports facial and nasal swelling and pain is significantly improved. He has some residual nasal swelling. Denies fever or chills. He received his first COVID-19 dose on 3/31.     He states he cannot tell a significant difference in his mood on wellbutrin. He does overall feel better due to the weather and being vaccinated for COVID-19. The Prozac helped significantly but he unfortunately did not tolerate it due to GI side-effects.     Past Medical History:   Diagnosis Date   • Anxiety        History reviewed. No pertinent surgical history.    History reviewed. No pertinent family history.    Social History     Socioeconomic History   • Marital status: Single     Spouse name: Not on file   • Number of children: Not on file   • Years of education: Not on file   • Highest education level: Not on file   Tobacco Use   • Smoking status: Never Smoker   • Smokeless tobacco: Never Used   Substance and Sexual Activity   • Alcohol use: No   • Drug use: Never   • Sexual activity: Defer       Allergies   Allergen Reactions   • Prozac [Fluoxetine Hcl] Diarrhea       ROS    Review of Systems   Constitutional: Negative for chills and fever.   HENT: Positive for postnasal drip and rhinorrhea.    Psychiatric/Behavioral: Positive for depressed mood. Negative for suicidal ideas.       Vitals:    04/07/21 1457   BP: 122/76   Pulse: 85   SpO2: 98%     Body mass index is 24.8 kg/m².      Current Outpatient Medications:   •  buPROPion XL (Wellbutrin XL) 300 MG  24 hr tablet, Take 1 tablet by mouth Every Morning., Disp: 30 tablet, Rfl: 1  •  cephalexin (KEFLEX) 500 MG capsule, Take 500 mg by mouth 4 (Four) Times a Day., Disp: , Rfl:   •  fluticasone (FLONASE) 50 MCG/ACT nasal spray, , Disp: , Rfl:   •  mupirocin (BACTROBAN) 2 % ointment, APPLY TOPICALLY TO THE SKIN TWICE DAILY, Disp: , Rfl:   •  predniSONE (DELTASONE) 20 MG tablet, , Disp: , Rfl:     PE    Physical Exam  Vitals reviewed.   Constitutional:       General: He is not in acute distress.     Appearance: He is well-developed.   HENT:      Head: Normocephalic and atraumatic.      Right Ear: Tympanic membrane normal.      Left Ear: Tympanic membrane normal.      Nose: Mucosal edema present.      Right Sinus: No maxillary sinus tenderness or frontal sinus tenderness.      Left Sinus: No maxillary sinus tenderness or frontal sinus tenderness.     Eyes:      Conjunctiva/sclera: Conjunctivae normal.   Cardiovascular:      Rate and Rhythm: Normal rate and regular rhythm.      Heart sounds: Normal heart sounds. No murmur heard.     Pulmonary:      Effort: Pulmonary effort is normal.      Breath sounds: Normal breath sounds.   Musculoskeletal:      Cervical back: Normal range of motion.   Skin:     General: Skin is warm and dry.   Neurological:      Mental Status: He is alert.      Gait: Gait normal.   Psychiatric:         Speech: Speech normal.         Behavior: Behavior normal.         Results    Results for orders placed or performed in visit on 07/30/18   HIV-1 / O / 2 Ag / Antibody 4th Generation    Specimen: Arm, Left; Blood   Result Value Ref Range    HIV-1/ HIV-2 Non-Reactive Non-Reactive   HSV 1 & 2 - Specific Antibody, IgG    Specimen: Arm, Left; Blood   Result Value Ref Range    HSV 1 IgG, Type Specific <0.91 0.00 - 0.90 index    HSV 2 IgG <0.91 0.00 - 0.90 index   RPR    Specimen: Arm, Left; Blood   Result Value Ref Range    RPR Non-Reactive Non-Reactive       A/P    Problem List Items Addressed This Visit      None      Visit Diagnoses     Nasal abscess    -  Primary  -Improving. Continue keflex, mupirocin  -Counseled patient call return if symptoms worsen.  Consider better coverage for staph if needed      Acute non-recurrent sinusitis, unspecified location      -Improved-see above      Environmental allergies      -Continue Flonase daily. He is also taking Claritin-D    Reactive depression  -Increase wellbutrin to 300 mg qd  -Consider change to lexapro if no significant improvement on follow-up  -RTC in 1 month or sooner if needed          Plan of care was reviewed with patient at the conclusion of today's visit. Education was provided regarding diagnoses, management, and the importance of keeping follow-up appointments. The patient was counseled regarding the risks, benefits, and possible side-effects of treatment. Patient and/or family express understanding and agreement with the management plan.        JAMIE Matos

## 2021-04-13 ENCOUNTER — OFFICE VISIT (OUTPATIENT)
Dept: FAMILY MEDICINE CLINIC | Facility: CLINIC | Age: 31
End: 2021-04-13

## 2021-04-13 VITALS
HEART RATE: 74 BPM | WEIGHT: 153.2 LBS | DIASTOLIC BLOOD PRESSURE: 76 MMHG | HEIGHT: 66 IN | BODY MASS INDEX: 24.62 KG/M2 | SYSTOLIC BLOOD PRESSURE: 122 MMHG | OXYGEN SATURATION: 98 %

## 2021-04-13 DIAGNOSIS — S59.901A ELBOW INJURY, RIGHT, INITIAL ENCOUNTER: Primary | ICD-10-CM

## 2021-04-13 PROCEDURE — 99213 OFFICE O/P EST LOW 20 MIN: CPT | Performed by: PHYSICIAN ASSISTANT

## 2021-04-13 NOTE — PROGRESS NOTES
"    Chief Complaint   Patient presents with   • Elbow Pain     was working out and felt his elbow popped and has been hurting ever since. Pt stated inward pressure hurts him. Pt states as the day go by, starting to feel a little bit better but still painful at times       HPI     Ronn Lebron is a pleasant 30 y.o. male who presents for evaluation of \"chief complaint.\"   He c/o of right medial elbow popping with ROM for 5 days. Started after he was in a jujutsu hold and heard 3 pops in his elbow. He did have some elbow soreness 1 hour after the injury that improved with icing and NSAIDs for a couple days and denies any current pain. No weakness or decreased function. He was able to do light [employer] drills today which did not aggravate it.     Past Medical History:   Diagnosis Date   • Anxiety        History reviewed. No pertinent surgical history.    History reviewed. No pertinent family history.    Social History     Socioeconomic History   • Marital status: Single     Spouse name: Not on file   • Number of children: Not on file   • Years of education: Not on file   • Highest education level: Not on file   Tobacco Use   • Smoking status: Never Smoker   • Smokeless tobacco: Never Used   Substance and Sexual Activity   • Alcohol use: No   • Drug use: Never   • Sexual activity: Defer       Allergies   Allergen Reactions   • Prozac [Fluoxetine Hcl] Diarrhea       ROS    Review of Systems   Musculoskeletal: Positive for arthralgias. Negative for joint swelling.       Vitals:    04/13/21 1437   BP: 122/76   Pulse: 74   SpO2: 98%     Body mass index is 25.1 kg/m².      Current Outpatient Medications:   •  buPROPion XL (Wellbutrin XL) 300 MG 24 hr tablet, Take 1 tablet by mouth Every Morning., Disp: 30 tablet, Rfl: 1  •  cephalexin (KEFLEX) 500 MG capsule, Take 500 mg by mouth 4 (Four) Times a Day., Disp: , Rfl:   •  fluticasone (FLONASE) 50 MCG/ACT nasal spray, , Disp: , Rfl:   •  mupirocin (BACTROBAN) 2 % ointment, " APPLY TOPICALLY TO THE SKIN TWICE DAILY, Disp: , Rfl:   •  predniSONE (DELTASONE) 20 MG tablet, , Disp: , Rfl:     PE    Physical Exam  Vitals reviewed.   Constitutional:       General: He is not in acute distress.  Pulmonary:      Effort: Pulmonary effort is normal. No respiratory distress.   Musculoskeletal:      Right elbow: No swelling or deformity. Normal range of motion. No tenderness.      Comments: Slight clicking with right elbow ROM. This is not present on the contralateral side. Right elbow extension, flexion strength 5/5.    Neurological:      Mental Status: He is alert.          A/P    Problem List Items Addressed This Visit     None      Visit Diagnoses     Elbow injury, right, initial encounter    -  Primary  -Discussed sprain and conservative treatment with rest, NSAIDs prn, light activity for 2 weeks. If the clicking is bothersome and persistent, counseled patient to call for orthopedic referral           Plan of care was reviewed with patient at the conclusion of today's visit. Education was provided regarding diagnoses, management, prescribed or recommended OTC products, and the importance of compliance with follow-up appointments. The patient was counseled regarding the risks, benefits, and possible side-effects of treatment. I advised the patient to keep me informed of any acute changes in their status including new, worsening, or persistent symptoms. Patient expresses understanding and agreement with the management plan.        JAMIE Matos

## 2021-04-13 NOTE — PATIENT INSTRUCTIONS
-Rest and only do light activities for 2 weeks  -If you continue to have the clicking in your elbow, let me know and we can send you to an orthopedist

## 2021-04-20 ENCOUNTER — TELEPHONE (OUTPATIENT)
Dept: FAMILY MEDICINE CLINIC | Facility: CLINIC | Age: 31
End: 2021-04-20

## 2021-04-20 NOTE — TELEPHONE ENCOUNTER
We recently adjusted his dose on 4/7. It can take 4-6 weeks to see if this will be effective. Recommend we discuss at his follow-up on 5/5

## 2021-04-20 NOTE — TELEPHONE ENCOUNTER
Called to relay information sent through by [facility], was unable to leave message. VM not set up at this time.

## 2021-04-22 NOTE — TELEPHONE ENCOUNTER
Attempted to contact patient, no answer. Unable to leave voicemail, sent message via [email or web address].

## 2021-04-26 NOTE — TELEPHONE ENCOUNTER
Pj Samuels PA         2:47 PM  Note     We recently adjusted his dose on 4/7. It can take 4-6 weeks to see if this will be effective. Recommend we discuss at his follow-up on 5/5         Relayed msg to pt when he called the office back. Pt understands and will keep his appt on 5/5. Pt has no further questions at this time.

## 2021-05-07 ENCOUNTER — LAB (OUTPATIENT)
Dept: LAB | Facility: HOSPITAL | Age: 31
End: 2021-05-07

## 2021-05-07 ENCOUNTER — OFFICE VISIT (OUTPATIENT)
Dept: FAMILY MEDICINE CLINIC | Facility: CLINIC | Age: 31
End: 2021-05-07

## 2021-05-07 VITALS
BODY MASS INDEX: 24.68 KG/M2 | WEIGHT: 153.6 LBS | DIASTOLIC BLOOD PRESSURE: 78 MMHG | HEART RATE: 63 BPM | HEIGHT: 66 IN | SYSTOLIC BLOOD PRESSURE: 120 MMHG | OXYGEN SATURATION: 99 %

## 2021-05-07 DIAGNOSIS — F32.A ANXIETY AND DEPRESSION: Primary | ICD-10-CM

## 2021-05-07 DIAGNOSIS — Z00.00 ROUTINE MEDICAL EXAM: ICD-10-CM

## 2021-05-07 DIAGNOSIS — F41.9 ANXIETY AND DEPRESSION: Primary | ICD-10-CM

## 2021-05-07 LAB
ALBUMIN SERPL-MCNC: 4.3 G/DL (ref 3.5–5.2)
ALBUMIN/GLOB SERPL: 2.2 G/DL
ALP SERPL-CCNC: 44 U/L (ref 39–117)
ALT SERPL W P-5'-P-CCNC: 20 U/L (ref 1–41)
ANION GAP SERPL CALCULATED.3IONS-SCNC: 10.4 MMOL/L (ref 5–15)
AST SERPL-CCNC: 24 U/L (ref 1–40)
BASOPHILS # BLD AUTO: 0.07 10*3/MM3 (ref 0–0.2)
BASOPHILS NFR BLD AUTO: 1.2 % (ref 0–1.5)
BILIRUB SERPL-MCNC: 0.2 MG/DL (ref 0–1.2)
BUN SERPL-MCNC: 21 MG/DL (ref 6–20)
BUN/CREAT SERPL: 23.6 (ref 7–25)
CALCIUM SPEC-SCNC: 9.4 MG/DL (ref 8.6–10.5)
CHLORIDE SERPL-SCNC: 106 MMOL/L (ref 98–107)
CO2 SERPL-SCNC: 26.6 MMOL/L (ref 22–29)
CREAT SERPL-MCNC: 0.89 MG/DL (ref 0.76–1.27)
DEPRECATED RDW RBC AUTO: 39.3 FL (ref 37–54)
EOSINOPHIL # BLD AUTO: 0.21 10*3/MM3 (ref 0–0.4)
EOSINOPHIL NFR BLD AUTO: 3.7 % (ref 0.3–6.2)
ERYTHROCYTE [DISTWIDTH] IN BLOOD BY AUTOMATED COUNT: 11.9 % (ref 12.3–15.4)
GFR SERPL CREATININE-BSD FRML MDRD: 102 ML/MIN/1.73
GLOBULIN UR ELPH-MCNC: 2 GM/DL
GLUCOSE SERPL-MCNC: 86 MG/DL (ref 65–99)
HCT VFR BLD AUTO: 45.1 % (ref 37.5–51)
HGB BLD-MCNC: 15 G/DL (ref 13–17.7)
IMM GRANULOCYTES # BLD AUTO: 0.02 10*3/MM3 (ref 0–0.05)
IMM GRANULOCYTES NFR BLD AUTO: 0.3 % (ref 0–0.5)
LYMPHOCYTES # BLD AUTO: 2.27 10*3/MM3 (ref 0.7–3.1)
LYMPHOCYTES NFR BLD AUTO: 39.6 % (ref 19.6–45.3)
MCH RBC QN AUTO: 30.1 PG (ref 26.6–33)
MCHC RBC AUTO-ENTMCNC: 33.3 G/DL (ref 31.5–35.7)
MCV RBC AUTO: 90.6 FL (ref 79–97)
MONOCYTES # BLD AUTO: 0.58 10*3/MM3 (ref 0.1–0.9)
MONOCYTES NFR BLD AUTO: 10.1 % (ref 5–12)
NEUTROPHILS NFR BLD AUTO: 2.58 10*3/MM3 (ref 1.7–7)
NEUTROPHILS NFR BLD AUTO: 45.1 % (ref 42.7–76)
NRBC BLD AUTO-RTO: 0 /100 WBC (ref 0–0.2)
PLATELET # BLD AUTO: 266 10*3/MM3 (ref 140–450)
PMV BLD AUTO: 10.8 FL (ref 6–12)
POTASSIUM SERPL-SCNC: 4.3 MMOL/L (ref 3.5–5.2)
PROT SERPL-MCNC: 6.3 G/DL (ref 6–8.5)
RBC # BLD AUTO: 4.98 10*6/MM3 (ref 4.14–5.8)
SODIUM SERPL-SCNC: 143 MMOL/L (ref 136–145)
TSH SERPL DL<=0.05 MIU/L-ACNC: 1.15 UIU/ML (ref 0.27–4.2)
WBC # BLD AUTO: 5.73 10*3/MM3 (ref 3.4–10.8)

## 2021-05-07 PROCEDURE — 80053 COMPREHEN METABOLIC PANEL: CPT

## 2021-05-07 PROCEDURE — 85025 COMPLETE CBC W/AUTO DIFF WBC: CPT

## 2021-05-07 PROCEDURE — 99214 OFFICE O/P EST MOD 30 MIN: CPT | Performed by: PHYSICIAN ASSISTANT

## 2021-05-07 PROCEDURE — 84443 ASSAY THYROID STIM HORMONE: CPT

## 2021-05-07 RX ORDER — ESCITALOPRAM OXALATE 10 MG/1
TABLET ORAL
Qty: 30 TABLET | Refills: 1 | Status: SHIPPED | OUTPATIENT
Start: 2021-05-07 | End: 2021-06-25

## 2021-05-07 RX ORDER — BUPROPION HYDROCHLORIDE 150 MG/1
150 TABLET ORAL EVERY MORNING
Qty: 14 TABLET | Refills: 0 | Status: SHIPPED | OUTPATIENT
Start: 2021-05-07 | End: 2021-06-25

## 2021-05-07 NOTE — PATIENT INSTRUCTIONS
-Decrease Wellbutrin to 150 mg once daily. After 1 week on this dose, start a half a tablet of Lexapro once daily and continue both the Lexapro and Wellbutrin for 1 week.  Then stop Wellbutrin after 1 week and increase Lexapro to a full tablet (10 mg) daily

## 2021-05-07 NOTE — PROGRESS NOTES
Chief Complaint   Patient presents with   • Medication Problem     Pts doesnt feel like the wellburtin is helping any. Pt feels like his mind is always racing and cant get it to slow down       HPI     Ronn Lebron is a 30 y.o. male who is here for follow-up of anxiety and depression.     He states he has not noticed an improvement in mood and actually has more more agitation after wellbutrin was increased. He felt great on Prozac but could not tolerate it due to GI side-effects. His brother is on medication for what sounds like bipolar depression. He is unsure of what this is however. Notices he dwells on things that happened years ago, negative thoughts, low mood. He admits to ongoing stressors related to his family which is a trigger. Both parents are not particularly interested in being a part of his life. Continues to follow with his therapist every 2 weeks. Once in the past 2 weeks he had a fleeting thought that it would not matter if he was around or not but he denies a plan and assures me he has no plan to harm himself. No recurrent SI since that time.     Past Medical History:   Diagnosis Date   • Anxiety        History reviewed. No pertinent surgical history.    History reviewed. No pertinent family history.    Social History     Socioeconomic History   • Marital status: Single     Spouse name: Not on file   • Number of children: Not on file   • Years of education: Not on file   • Highest education level: Not on file   Tobacco Use   • Smoking status: Never Smoker   • Smokeless tobacco: Never Used   Substance and Sexual Activity   • Alcohol use: No   • Drug use: Never   • Sexual activity: Defer       Allergies   Allergen Reactions   • Prozac [Fluoxetine Hcl] Diarrhea       ROS    Review of Systems   Psychiatric/Behavioral: Positive for suicidal ideas and depressed mood. Negative for sleep disturbance. The patient is nervous/anxious.        Vitals:    05/07/21 0954   BP: 120/78   Pulse: 63   SpO2: 99%      Body mass index is 25.16 kg/m².      Current Outpatient Medications:   •  fluticasone (FLONASE) 50 MCG/ACT nasal spray, , Disp: , Rfl:   •  buPROPion XL (Wellbutrin XL) 150 MG 24 hr tablet, Take 1 tablet by mouth Every Morning for 14 days., Disp: 14 tablet, Rfl: 0  •  escitalopram (Lexapro) 10 MG tablet, Take 1/2 tablet once daily for one week, then 1 tablet daily, Disp: 30 tablet, Rfl: 1    PE    Physical Exam  Vitals reviewed.   Constitutional:       General: He is not in acute distress.     Appearance: He is well-developed.   HENT:      Head: Normocephalic and atraumatic.   Eyes:      Conjunctiva/sclera: Conjunctivae normal.   Cardiovascular:      Rate and Rhythm: Normal rate and regular rhythm.      Heart sounds: Normal heart sounds. No murmur heard.     Pulmonary:      Effort: Pulmonary effort is normal.      Breath sounds: Normal breath sounds.   Musculoskeletal:      Cervical back: Normal range of motion.   Skin:     General: Skin is warm and dry.   Neurological:      Mental Status: He is alert.      Gait: Gait normal.   Psychiatric:         Mood and Affect: Mood is anxious.         Speech: Speech normal.         Behavior: Behavior normal.         Thought Content: Thought content normal.         Judgment: Judgment normal.         Results    Results for orders placed or performed in visit on 07/30/18   HIV-1 / O / 2 Ag / Antibody 4th Generation    Specimen: Arm, Left; Blood   Result Value Ref Range    HIV-1/ HIV-2 Non-Reactive Non-Reactive   HSV 1 & 2 - Specific Antibody, IgG    Specimen: Arm, Left; Blood   Result Value Ref Range    HSV 1 IgG, Type Specific <0.91 0.00 - 0.90 index    HSV 2 IgG <0.91 0.00 - 0.90 index   RPR    Specimen: Arm, Left; Blood   Result Value Ref Range    RPR Non-Reactive Non-Reactive       A/P    Problem List Items Addressed This Visit     None      Visit Diagnoses     Anxiety and depression    -  Primary  -PHQ-9: 14. DASHA-7: 17  -Taper wellbutrin due to agitation and start  lexapro  -RTC in 6 weeks for wellness visit. Counseled patient to complete labs requested in March to evaluate for thyroid dysfunction. He agrees to do this. If lexapro is not tolerated, consider SNRI    Relevant Medications    buPROPion XL (Wellbutrin XL) 150 MG 24 hr tablet    escitalopram (Lexapro) 10 MG tablet          Plan of care was reviewed with patient at the conclusion of today's visit. Education was provided regarding diagnoses, management, and the importance of keeping follow-up appointments. The patient was counseled regarding the risks, benefits, and possible side-effects of treatment. Patient and/or family express understanding and agreement with the management plan.        JAMIE Matos

## 2021-05-07 NOTE — PROGRESS NOTES
I have reviewed the notes, assessments, and/or procedures performed by Pj Samuels, I concur with her/his documentation of Ronn Lebron.

## 2021-06-11 ENCOUNTER — TELEPHONE (OUTPATIENT)
Dept: FAMILY MEDICINE CLINIC | Facility: CLINIC | Age: 31
End: 2021-06-11

## 2021-06-11 NOTE — TELEPHONE ENCOUNTER
Provider: LEONARDO URENA    Caller:TAYE SHARMA    Relationship to Patient: SELF     Pharmacy: [id number] - [place], [place] - [address] AT [facility] - [phone]   Phone Number:[phone]       Reason for Call: PATIENT CALLED AND WOULD LIKE TO KNOW IF ANY RECOMMENDATIONS AS TO WHO HE COULD SEE TO BE TESTED FOR ADHD     When was the patient last seen: 5/7/2021    When did it start: SEEMS LIKE FOREVER     Where is it located: N/A     Characteristics of symptom/severity: HARD TIME FOCUSING  AND GETTING ANY WORK DONE     Timing- Is it constant or intermittent: CONSTANT     What makes it worse: N/A    What makes it better: N/A

## 2021-06-25 ENCOUNTER — OFFICE VISIT (OUTPATIENT)
Dept: FAMILY MEDICINE CLINIC | Facility: CLINIC | Age: 31
End: 2021-06-25

## 2021-06-25 ENCOUNTER — LAB (OUTPATIENT)
Dept: LAB | Facility: HOSPITAL | Age: 31
End: 2021-06-25

## 2021-06-25 VITALS
HEART RATE: 88 BPM | BODY MASS INDEX: 23.63 KG/M2 | HEIGHT: 66 IN | DIASTOLIC BLOOD PRESSURE: 70 MMHG | WEIGHT: 147 LBS | OXYGEN SATURATION: 98 % | SYSTOLIC BLOOD PRESSURE: 126 MMHG

## 2021-06-25 DIAGNOSIS — F32.9 REACTIVE DEPRESSION: ICD-10-CM

## 2021-06-25 DIAGNOSIS — Z00.00 PREVENTATIVE HEALTH CARE: Primary | ICD-10-CM

## 2021-06-25 DIAGNOSIS — J30.9 ALLERGIC RHINITIS, UNSPECIFIED SEASONALITY, UNSPECIFIED TRIGGER: ICD-10-CM

## 2021-06-25 DIAGNOSIS — Z11.3 ROUTINE SCREENING FOR STI (SEXUALLY TRANSMITTED INFECTION): ICD-10-CM

## 2021-06-25 DIAGNOSIS — Z00.00 PREVENTATIVE HEALTH CARE: ICD-10-CM

## 2021-06-25 DIAGNOSIS — Z80.8 FAMILY HISTORY OF MALIGNANT MELANOMA OF SKIN: ICD-10-CM

## 2021-06-25 DIAGNOSIS — R41.840 INATTENTION: ICD-10-CM

## 2021-06-25 LAB
CHOLEST SERPL-MCNC: 160 MG/DL (ref 0–200)
HBV SURFACE AB SER RIA-ACNC: NORMAL
HBV SURFACE AG SERPL QL IA: NORMAL
HCV AB SER DONR QL: NORMAL
HDLC SERPL-MCNC: 60 MG/DL (ref 40–60)
HIV1+2 AB SER QL: NORMAL
LDLC SERPL CALC-MCNC: 87 MG/DL (ref 0–100)
LDLC/HDLC SERPL: 1.45 {RATIO}
TRIGL SERPL-MCNC: 66 MG/DL (ref 0–150)
VLDLC SERPL-MCNC: 13 MG/DL (ref 5–40)

## 2021-06-25 PROCEDURE — G0432 EIA HIV-1/HIV-2 SCREEN: HCPCS

## 2021-06-25 PROCEDURE — 99395 PREV VISIT EST AGE 18-39: CPT | Performed by: PHYSICIAN ASSISTANT

## 2021-06-25 PROCEDURE — 87491 CHLMYD TRACH DNA AMP PROBE: CPT

## 2021-06-25 PROCEDURE — 87591 N.GONORRHOEAE DNA AMP PROB: CPT

## 2021-06-25 PROCEDURE — 3008F BODY MASS INDEX DOCD: CPT | Performed by: PHYSICIAN ASSISTANT

## 2021-06-25 PROCEDURE — 86706 HEP B SURFACE ANTIBODY: CPT

## 2021-06-25 PROCEDURE — 80061 LIPID PANEL: CPT

## 2021-06-25 PROCEDURE — 87340 HEPATITIS B SURFACE AG IA: CPT

## 2021-06-25 PROCEDURE — 86803 HEPATITIS C AB TEST: CPT

## 2021-06-25 PROCEDURE — 2014F MENTAL STATUS ASSESS: CPT | Performed by: PHYSICIAN ASSISTANT

## 2021-06-25 PROCEDURE — 86592 SYPHILIS TEST NON-TREP QUAL: CPT

## 2021-06-25 RX ORDER — DESVENLAFAXINE SUCCINATE 50 MG/1
50 TABLET, EXTENDED RELEASE ORAL DAILY
Qty: 30 TABLET | Refills: 5 | Status: SHIPPED | OUTPATIENT
Start: 2021-06-25 | End: 2021-10-04

## 2021-06-25 RX ORDER — KETOCONAZOLE 20 MG/G
CREAM TOPICAL
COMMUNITY
Start: 2021-05-24 | End: 2021-08-23

## 2021-06-25 NOTE — PATIENT INSTRUCTIONS
-Go to the [facility] diagnostic [place] lab located at [address] [place] [address] [facility] for blood work. They are open from 8 AM to 4:15 PM Monday through Friday. You do not need an appointment

## 2021-06-25 NOTE — PROGRESS NOTES
Reason for visit    Ronn Lebron is a 30 y.o. male who presents for annual comprehensive exam.    Chief Complaint   Patient presents with   • Annual Exam   • Anxiety     follow up   • Depression     follow up       HPI     Here for physical.  Lexapro improved mood but feels tired all the time. Felt more neutral and had less negative thoughts. He is sleeping close to 10 hrs and needing midday nap. He is still taking 5 mg of lexapro. Feels like he is losing ability to focus which has been chronic for years. Has not been able to focus on anything for very long. He is interested in testing for ADHD.   Chronic nasal congestion, runny nose. History of prior allergy testing but didn't start immunotherapy due to thinking he was going to move out of the state. He is using flonase. Antihistamines make him drowsy so he doesn't use them.     Diet/Physical activity:  -healthy diet  -very physically active, jujitsu twice daily, frisbee, wt training    Immunizations:  -Unclear tetanus status. May have been completed with pediatrician    Cancer screening:   -Positive Fhx: breast breast cancer (mat aunt), melanoma (father)  -Negative Fhx: testicular, prostate, colon cancer, colon polyps    Depression: PHQ-2 Depression Screening  -See screening performed on 5/7/21    Substance use:  -Rare alcohol use once every 2 months, 1-2 beers  -Denies tobacco or drug use. Former marijuana use  -1 cup coffee/day    Sexual health:  -Different partners at times, always uses condoms  -No hx of STIs. Last screened in 2018 but has had unprotected encounters since  -Interested in screening    AAA screen:  -Not indicated    Dental/vision/skin:  -No recent eye exam, wears contacts, no current issues  -Current with dental exams  -No new or concerning skin lesions  -He does not follow with dermatology    Past Medical History:   Diagnosis Date   • Anxiety        History reviewed. No pertinent surgical history.    History reviewed. No pertinent family  history.    Social History     Socioeconomic History   • Marital status: Single     Spouse name: Not on file   • Number of children: Not on file   • Years of education: Not on file   • Highest education level: Not on file   Tobacco Use   • Smoking status: Never Smoker   • Smokeless tobacco: Never Used   Substance and Sexual Activity   • Alcohol use: No   • Drug use: Never   • Sexual activity: Defer       Allergies   Allergen Reactions   • Prozac [Fluoxetine Hcl] Diarrhea       ROS    Review of Systems   Constitutional: Positive for fatigue. Negative for appetite change, chills, diaphoresis, fever, unexpected weight gain and unexpected weight loss.   HENT: Positive for congestion, postnasal drip and rhinorrhea. Negative for sore throat, swollen glands and trouble swallowing.    Eyes: Positive for discharge and itching.   Respiratory: Negative for cough, shortness of breath and wheezing.    Cardiovascular: Negative for chest pain, palpitations and leg swelling.   Gastrointestinal: Negative for abdominal pain, blood in stool, constipation, diarrhea and GERD.   Endocrine: Negative for polydipsia.   Genitourinary: Positive for nocturia (1-2, stable whole life). Negative for dysuria, hematuria, scrotal swelling and testicular pain.   Musculoskeletal: Negative for arthralgias and myalgias.   Skin: Negative for rash and skin lesions.   Allergic/Immunologic: Positive for environmental allergies.   Neurological: Negative for dizziness, syncope, numbness and headache.   Hematological: Negative for adenopathy.   Psychiatric/Behavioral: Positive for depressed mood. Negative for suicidal ideas. The patient is nervous/anxious.        Vitals:    06/25/21 1334   BP: 126/70   Pulse: 88   SpO2: 98%     Body mass index is 24.08 kg/m².      Current Outpatient Medications:   •  fluticasone (FLONASE) 50 MCG/ACT nasal spray, , Disp: , Rfl:   •  ketoconazole (NIZORAL) 2 % cream, APPTY TOPICALLY TWICE DAILY TO AFFECTED AREAS, Disp: , Rfl:    •  desvenlafaxine (Pristiq) 50 MG 24 hr tablet, Take 1 tablet by mouth Daily., Disp: 30 tablet, Rfl: 5    PE    Physical Exam  Vitals reviewed.   Constitutional:       General: He is not in acute distress.     Appearance: He is well-developed.   HENT:      Head: Normocephalic and atraumatic.      Right Ear: Hearing and tympanic membrane normal.      Left Ear: Hearing and tympanic membrane normal.      Mouth/Throat:      Mouth: Mucous membranes are moist.      Dentition: Normal dentition.      Pharynx: Oropharynx is clear.   Eyes:      Extraocular Movements: Extraocular movements intact.      Conjunctiva/sclera: Conjunctivae normal.      Pupils: Pupils are equal, round, and reactive to light.      Comments:      Neck:      Thyroid: No thyroid mass or thyromegaly.      Vascular: No carotid bruit.   Cardiovascular:      Rate and Rhythm: Normal rate and regular rhythm.      Heart sounds: No murmur heard.   No friction rub.   Pulmonary:      Effort: Pulmonary effort is normal.      Breath sounds: Normal breath sounds.   Abdominal:      General: Bowel sounds are normal. There is no abdominal bruit.      Palpations: Abdomen is soft. There is no mass.      Tenderness: There is no abdominal tenderness.   Musculoskeletal:         General: Normal range of motion.      Cervical back: Normal range of motion and neck supple.   Lymphadenopathy:      Cervical: No cervical adenopathy.      Upper Body:      Right upper body: No supraclavicular adenopathy.      Left upper body: No supraclavicular adenopathy.   Skin:     General: Skin is warm and dry.      Findings: No rash.      Nails: There is no clubbing.      Comments: No suspicious nevi   Neurological:      Mental Status: He is alert.      Gait: Gait normal.      Deep Tendon Reflexes: Reflexes normal.   Psychiatric:         Speech: Speech normal.         Behavior: Behavior normal.         A/P    Problem List Items Addressed This Visit        Allergies and Adverse Reactions     Allergic rhinitis  -Continue flonase. Intolerant to antihistamines  interested in immunotherapy  -Refer to allergist    Relevant Orders    Ambulatory Referral to Allergy       Mental Health    Reactive depression  -No SI/HI. Lexapro, prozac, and wellbutrin not tolerated  -Trial pristiq and refer to behavioral health  -RTC in 6 weeks or sooner if needed    Relevant Medications    desvenlafaxine (Pristiq) 50 MG 24 hr tablet    Other Relevant Orders    Ambulatory Referral to Behavioral Health      Other Visit Diagnoses     Preventative health care    -  Primary  -Counseled patient regarding cancer screening, immunizations, healthy lifestyle, diet, maintaining a healthy weight, and exercise.   -Discussed monthly testicular self-checks  -Annual dental and eye exams were encouraged  -Records release requested for immunizations  -STI screening    Relevant Orders    Hepatitis B Surface Antigen    Hepatitis B Surface Antibody    Chlamydia trachomatis, Neisseria gonorrhoeae, PCR - Urine, Urine, Clean Catch    Hepatitis C Antibody    HIV-1 / O / 2 Ag / Antibody 4th Generation    RPR    Lipid Panel    Inattention        Relevant Orders    Ambulatory Referral to Behavioral Health    Routine screening for STI (sexually transmitted infection)        Family history of malignant melanoma of skin      -Counseled patient to have annual skin checks with dermatology  -Refer to establish care    Relevant Orders    Ambulatory Referral to Dermatology          Plan of care was reviewed with patient at the conclusion of today's visit. Education was provided regarding diagnoses, management, treatment plan, and the importance of keeping follow-up appointments. The patient was counseled regarding the risks, benefits, and possible side-effects of treatment. Patient and/or family expresses understanding and agreement with the management plan.         JAMIE Matos

## 2021-06-26 LAB — RPR SER QL: NORMAL

## 2021-06-28 LAB
C TRACH RRNA SPEC QL NAA+PROBE: NEGATIVE
N GONORRHOEA RRNA SPEC QL NAA+PROBE: NEGATIVE

## 2021-07-16 ENCOUNTER — TELEPHONE (OUTPATIENT)
Dept: FAMILY MEDICINE CLINIC | Facility: CLINIC | Age: 31
End: 2021-07-16

## 2021-07-16 NOTE — TELEPHONE ENCOUNTER
Sarah Beth from [facility] office called requesting patient's referral to be sent over to them at [phone]  Thank you

## 2021-07-23 NOTE — TELEPHONE ENCOUNTER
MARCI FROM DR. DSOUZA' OFFICE ([facility]) CALLED ASKING FOR A PASSPORT REFERRAL WITH A START DATE OF 7/7/21.     PHONE: [phone]  FAX: [phone]

## 2021-07-30 ENCOUNTER — TELEPHONE (OUTPATIENT)
Dept: FAMILY MEDICINE CLINIC | Facility: CLINIC | Age: 31
End: 2021-07-30

## 2021-07-30 ENCOUNTER — OFFICE VISIT (OUTPATIENT)
Dept: FAMILY MEDICINE CLINIC | Facility: CLINIC | Age: 31
End: 2021-07-30

## 2021-07-30 VITALS
DIASTOLIC BLOOD PRESSURE: 64 MMHG | SYSTOLIC BLOOD PRESSURE: 118 MMHG | OXYGEN SATURATION: 98 % | HEART RATE: 104 BPM | WEIGHT: 148.65 LBS | HEIGHT: 66 IN | BODY MASS INDEX: 23.89 KG/M2

## 2021-07-30 DIAGNOSIS — F33.40 RECURRENT MAJOR DEPRESSIVE DISORDER, IN REMISSION (HCC): Primary | ICD-10-CM

## 2021-07-30 DIAGNOSIS — L23.7 ALLERGIC CONTACT DERMATITIS DUE TO PLANTS, EXCEPT FOOD: ICD-10-CM

## 2021-07-30 PROCEDURE — 99213 OFFICE O/P EST LOW 20 MIN: CPT | Performed by: PHYSICIAN ASSISTANT

## 2021-07-30 RX ORDER — FLUOCINONIDE 1 MG/G
CREAM TOPICAL EVERY 12 HOURS SCHEDULED
Qty: 30 G | Refills: 0 | Status: SHIPPED | OUTPATIENT
Start: 2021-07-30 | End: 2021-08-13

## 2021-07-30 RX ORDER — HYDROXYZINE HYDROCHLORIDE 25 MG/1
25 TABLET, FILM COATED ORAL 3 TIMES DAILY PRN
Qty: 30 TABLET | Refills: 0 | Status: SHIPPED | OUTPATIENT
Start: 2021-07-30 | End: 2021-08-15

## 2021-07-30 RX ORDER — IPRATROPIUM BROMIDE 42 UG/1
SPRAY, METERED NASAL
COMMUNITY
Start: 2021-07-08

## 2021-07-30 NOTE — TELEPHONE ENCOUNTER
(im sorry to put in another message ):) PT CALLED AGAIN NEEDING A DIFFERENT TOPICAL STEROID CREAM BEC FLUOCINONIDE 0.1% IS NOT COVERED BY INSURANCE. HE KEEPS SAYING HE IS IN A LOT OF PAIN AND HES REALLY SAD. HE LEAVES FOR VACATION TOMORROW AND APOLOGIZED FOR CALLING BUT HE  WANTED TO GET SOME SORT OF ALTERNATIVE FOR THIS CREAM IT BEFORE HE LEFT.

## 2021-07-30 NOTE — PROGRESS NOTES
Chief Complaint   Patient presents with   • Depression     Medication f/u. Pt states medication has helped him alot. Pt states the only negative he has is the sexual side. His sex drive has lowered   • Rash     Pt states was seen at [facility] and was dx with scabies. Pt states he was given a cream and it made it worse.        HPI     Ronn Lebron is a 30 y.o. male who is here for 1-month follow-up of depression. Patient reports his mood is much better on Pristiq. Having some sexual side-effects but would like to continue medication at this time. Did not tolerate wellbutrin, lexapro, and prozac.     States a rash started after being exposed to plants the day before. He was treated at [facility] for scabies and with steroid injection and oral steroids. Rash is better but not resolved.     Past Medical History:   Diagnosis Date   • Anxiety        History reviewed. No pertinent surgical history.    History reviewed. No pertinent family history.    Social History     Socioeconomic History   • Marital status: Single     Spouse name: Not on file   • Number of children: Not on file   • Years of education: Not on file   • Highest education level: Not on file   Tobacco Use   • Smoking status: Never Smoker   • Smokeless tobacco: Never Used   Substance and Sexual Activity   • Alcohol use: No   • Drug use: Never   • Sexual activity: Defer       Allergies   Allergen Reactions   • Prozac [Fluoxetine Hcl] Diarrhea       ROS    Review of Systems    Vitals:    07/30/21 1115   BP: 118/64   Pulse: 104   SpO2: 98%     Body mass index is 24.35 kg/m².      Current Outpatient Medications:   •  desvenlafaxine (Pristiq) 50 MG 24 hr tablet, Take 1 tablet by mouth Daily., Disp: 30 tablet, Rfl: 5  •  fluticasone (FLONASE) 50 MCG/ACT nasal spray, , Disp: , Rfl:   •  ipratropium (ATROVENT) 0.06 % nasal spray, USE 1-2 SPRAYS EACH NOSTRIL TWICE DAILY AS NEEDED, Disp: , Rfl:   •  ketoconazole (NIZORAL) 2 % cream, APPTY TOPICALLY TWICE DAILY TO AFFECTED AREAS,  Disp: , Rfl:   •  mupirocin (BACTROBAN) 2 % ointment, APPLY TO THE AFFECTED AREA ON SKIN TWICE DAILY, Disp: , Rfl:   •  fluocinonide 0.1 % cream cream, Apply  topically to the appropriate area as directed Every 12 (Twelve) Hours for 14 days., Disp: 30 g, Rfl: 0  •  hydrOXYzine (ATARAX) 25 MG tablet, Take 1 tablet by mouth 3 (Three) Times a Day As Needed for Itching., Disp: 30 tablet, Rfl: 0    PE    Physical Exam  Vitals reviewed.   Constitutional:       General: He is not in acute distress.     Appearance: He is well-developed.   HENT:      Head: Normocephalic and atraumatic.   Eyes:      Conjunctiva/sclera: Conjunctivae normal.   Cardiovascular:      Rate and Rhythm: Normal rate and regular rhythm.      Heart sounds: Normal heart sounds. No murmur heard.     Pulmonary:      Effort: Pulmonary effort is normal.      Breath sounds: Normal breath sounds.   Musculoskeletal:      Cervical back: Normal range of motion.   Skin:     General: Skin is warm and dry.      Comments: Very scattered, patchy erythematous rash to extremities, torso, hands. Some areas have linear distribution c/w poison ivy   Neurological:      Mental Status: He is alert.      Gait: Gait normal.   Psychiatric:         Speech: Speech normal.         Behavior: Behavior normal.         Results    Results for orders placed or performed in visit on 06/25/21   Chlamydia trachomatis, Neisseria gonorrhoeae, PCR - Urine, Urine, Clean Catch    Specimen: Urine, Clean Catch   Result Value Ref Range    Chlamydia trachomatis, MONICA Negative Negative    Neisseria gonorrhoeae, MONICA Negative Negative   Hepatitis B Surface Antigen    Specimen: Blood   Result Value Ref Range    Hepatitis B Surface Ag Non-Reactive Non-Reactive   Hepatitis B Surface Antibody    Specimen: Blood   Result Value Ref Range    Hep B S Ab Non-Reactive Non-Reactive   Hepatitis C Antibody    Specimen: Blood   Result Value Ref Range    Hepatitis C Ab Non-Reactive Non-Reactive   HIV-1 / O / 2 Ag /  Antibody 4th Generation    Specimen: Blood   Result Value Ref Range    HIV-1/ HIV-2 Non-Reactive Non-Reactive   RPR    Specimen: Blood   Result Value Ref Range    RPR Non-Reactive Non-Reactive   Lipid Panel    Specimen: Blood   Result Value Ref Range    Total Cholesterol 160 0 - 200 mg/dL    Triglycerides 66 0 - 150 mg/dL    HDL Cholesterol 60 40 - 60 mg/dL    LDL Cholesterol  87 0 - 100 mg/dL    VLDL Cholesterol 13 5 - 40 mg/dL    LDL/HDL Ratio 1.45        A/P    Problem List Items Addressed This Visit        Mental Health    Recurrent major depressive disorder, in remission (CMS/[facility]) - Primary  -Continue Pristiq  -Return to clinic in 6 months    Relevant Medications    hydrOXYzine (ATARAX) 25 MG tablet      Other Visit Diagnoses     Allergic contact dermatitis due to plants, except food      -We will prescribe potent topical steroid for poison ivy. Patient was counseled to avoid applying in skin creases into face.  Use this for no more than 2 weeks. Hydroxyzine as needed itching    Relevant Medications    mupirocin (BACTROBAN) 2 % ointment    fluocinonide 0.1 % cream cream          Plan of care was reviewed with patient at the conclusion of today's visit. Education was provided regarding diagnoses, management, and the importance of keeping follow-up appointments. The patient was counseled regarding the risks, benefits, and possible side-effects of treatment. Patient and/or family express understanding and agreement with the management plan.        JAMIE Matos

## 2021-07-30 NOTE — TELEPHONE ENCOUNTER
[id number] - [facility] [address] AT [facility] - [phone] [facility] [phone]      Pharmacy representative name: ALEXIS    Pharmacy representative phone number: [phone]    What medication are you calling in regards to: fluocinonide 0.1 % cream       What question does the pharmacy have:   THIS ABOVE MEDICATION IS NOT COVERED UNDER THE PATIENT'S INSURANCE AND AN ALTERNATE STEROID CREAM SUCH AS  HYDROCODONE CREAM 2.5% MIGHT BE  COVERED.     ALEXIS IS UNSURE IF CLOBETASOL 0.05% MIGHT BE COVERED AS AN ALTERNATE.         Who is the provider that prescribed the medication: LEONARDO GOMEZ  Additional notes:  PLEASE CALL AND ADVISE -[phone]

## 2021-07-30 NOTE — TELEPHONE ENCOUNTER
PT called he is at the pharmacy right now. The cream that Pj sent in for him is not covered by his insurance (fluocininide 0.1% cream)   The pharmacy said to call us and ask if we an give any other recommendations for a cream that would be covered under his insurance. He said he would like to try and get it asap and when we give the new order to call him back and let him know.

## 2021-07-30 NOTE — TELEPHONE ENCOUNTER
Contacted patient to advise him about GoodRx to get medicine for $15 He verbalized understanding and agreed     I contacted pharmacy to apply coupon card to his account. Pharmacist advised me that they are currently out of stock and that [facility] has Fluocinonide cream available. Rx has been transferred to different location and patient has been advised.

## 2021-08-15 ENCOUNTER — HOSPITAL ENCOUNTER (EMERGENCY)
Facility: HOSPITAL | Age: 31
Discharge: HOME | End: 2021-08-15
Attending: EMERGENCY MEDICINE | Admitting: EMERGENCY MEDICINE

## 2021-08-15 ENCOUNTER — APPOINTMENT (OUTPATIENT)
Dept: CT IMAGING | Facility: HOSPITAL | Age: 31
End: 2021-08-15

## 2021-08-15 VITALS
WEIGHT: 146 LBS | DIASTOLIC BLOOD PRESSURE: 79 MMHG | BODY MASS INDEX: 24.32 KG/M2 | SYSTOLIC BLOOD PRESSURE: 126 MMHG | TEMPERATURE: 98.2 F | HEIGHT: 65 IN | OXYGEN SATURATION: 99 % | RESPIRATION RATE: 16 BRPM | HEART RATE: 86 BPM

## 2021-08-15 DIAGNOSIS — F07.81 POSTCONCUSSIVE SYNDROME: Primary | ICD-10-CM

## 2021-08-15 DIAGNOSIS — S09.90XA CLOSED HEAD INJURY, INITIAL ENCOUNTER: ICD-10-CM

## 2021-08-15 PROCEDURE — 70450 CT HEAD/BRAIN W/O DYE: CPT

## 2021-08-15 PROCEDURE — 70486 CT MAXILLOFACIAL W/O DYE: CPT

## 2021-08-15 PROCEDURE — 99282 EMERGENCY DEPT VISIT SF MDM: CPT

## 2021-08-15 PROCEDURE — 72125 CT NECK SPINE W/O DYE: CPT

## 2021-08-15 RX ORDER — BUTALBITAL, ACETAMINOPHEN AND CAFFEINE 50; 325; 40 MG/1; MG/1; MG/1
1-2 TABLET ORAL EVERY 6 HOURS PRN
Qty: 20 TABLET | Refills: 0 | Status: SHIPPED | OUTPATIENT
Start: 2021-08-15 | End: 2023-01-10

## 2021-08-15 NOTE — DISCHARGE INSTRUCTIONS
Rest, no strenuous activity, maintain your fluid and nutritional intake.  Recommend no driving until evaluated in 2 days by your primary care provider.  Return to the emergency department immediately if any change or worsening of symptoms.

## 2021-08-15 NOTE — ED PROVIDER NOTES
" EMERGENCY DEPARTMENT ENCOUNTER    Pt Name: Ronn Lebron  MRN: [id number]  Pt :   1990  Room Number:  25SF/25  Date of encounter:  8/15/2021  PCP: Pj Samuels PA  ED Provider: Choco Carpenter PA-C    Historian: Patient      HPI:  Chief Complaint:  Right temporal injury         Context: Ronn Lebron is a 30 y.o. male who presents to the ED c/o right temporal pain after getting kneed during karate practice 1 hour and 30 minutes ago. Pain is localized to right temporal area and right TMJ. States he saw little stars for about 20 seconds after the injury. These visual changes have since subsided and denies any current visual changes. Denies LOC but feels \"out of it and at 75%.\" Admits to having some mental confusion and trouble articulating words. Admits to jaw pain when trying to open his mouth and some wea. He denies chest pain, dyspnea, fever, chills, N/V, ear pain or drainage, eye pain or redness, neck pain, neck stiffness, bowel/bladder incontinence.     PAST MEDICAL HISTORY  Past Medical History:   Diagnosis Date   • Anxiety          PAST SURGICAL HISTORY  History reviewed. No pertinent surgical history.      FAMILY HISTORY  History reviewed. No pertinent family history.      SOCIAL HISTORY  Social History     Socioeconomic History   • Marital status: Single     Spouse name: Not on file   • Number of children: Not on file   • Years of education: Not on file   • Highest education level: Not on file   Tobacco Use   • Smoking status: Never Smoker   • Smokeless tobacco: Never Used   Substance and Sexual Activity   • Alcohol use: Yes     Comment: socially   • Drug use: Yes     Types: Marijuana     Comment: socially   • Sexual activity: Defer         ALLERGIES  Prozac [fluoxetine hcl]        REVIEW OF SYSTEMS  Review of Systems   Constitutional: Negative for activity change, appetite change, chills, fatigue and fever.   HENT: Positive for facial swelling. Negative for congestion, ear pain, rhinorrhea " and tinnitus.    Eyes: Negative for photophobia, pain, discharge, redness and visual disturbance.   Respiratory: Negative for cough, chest tightness and shortness of breath.    Gastrointestinal: Negative for abdominal pain, nausea and vomiting.   Neurological: Positive for dizziness, speech difficulty and weakness. Negative for tremors, seizures, syncope and numbness.   Psychiatric/Behavioral: Positive for confusion.              PHYSICAL EXAM    I have reviewed the triage vital signs and nursing notes.    ED Triage Vitals [08/15/21 1558]   Temp Heart Rate Resp BP SpO2   98.2 °F (36.8 °C) 86 16 126/79 99 %      Temp src Heart Rate Source Patient Position BP Location FiO2 (%)   Temporal Monitor Sitting Left arm --       Physical Exam  GENERAL:   Appears well developed, well nourished, in no acute distress. Alert and oriented. Hemodynamically stable.  HENT: Nares patent. Nasal septum midline with no deformities. No uvular deviation and normal oropharynx. 1 cm soft tissue swelling on right temple with no overlying skin abrasian. No ear deformitities bilaterally. No hemotympanum noted bilaterally.  EYES: No scleral icterus. PERRL. EOMI. No eye redness or discharge noted bilaterally.  CV: Regular rhythm, regular rate.  RESPIRATORY: Normal effort.  No audible wheezes, rales or rhonchi.  ABDOMEN: Soft, nontender  MUSCULOSKELETAL: No deformities.   NEURO: CN II-XII intact. Alert, moves all extremities, follows commands. Sensory and motor function in tact in upper and lower extremities bilaterally. Normal gait.  SKIN: Warm, dry, no rash visualized.        LAB RESULTS  No results found for this or any previous visit (from the past 24 hour(s)).    If labs were ordered, I independently reviewed the results.        RADIOLOGY  CT Head Without Contrast, CT Maxillofacial Without Contrast    Result Date: 8/15/2021  EXAMINATION: CT MAXILLOFACIAL WO CONT-, CT HEAD WO CONTRAST-  INDICATION: Kneed in R side of face / jaw - painful to  open jaw  TECHNIQUE: CT maxillofacial without intravenous contrast, CT head without intravenous contrast  The radiation dose reduction device was turned on for each scan per the ALARA (As Low as Reasonably Achievable) protocol.  COMPARISON: NONE  FINDINGS:  CT HEAD: Midline structures are symmetric without evidence of mass, mass effect or midline shift. Ventricles and sulci within normal limits. No intra-axial major extra-axial fluid collection. Globes and orbits unremarkable. Paranasal sinuses and mastoid cells are grossly clear well-pneumatized. Calvarium intact.  Facial bones reveal mandible intact along with probable rims preserved. Zygomatic arches within normal limits without acute fracture. Overall dentition including maxillary and mandibular dentition unremarkable for regularity. Paranasal sinuses clear. Nasal septum midline and intact. Nasal bones intact. Pterygoid plates intact. Ethmoid roof intact on sagittal and coronal reconstructions performed        No acute intracranial findings or acute facial bone fracture with normal cortical margins of the right facial bone structures including mandible and zygomatic arch. Globes and orbits unremarkable without orbital fracture       CT Cervical Spine Without Contrast    Result Date: 8/15/2021  EXAMINATION: CT CERVICAL SPINE WO CONTRAST-  INDICATION: Kneed in head  TECHNIQUE: CT cervical spine without intervenous contrast  The radiation dose reduction device was turned on for each scan per the ALARA (As Low as Reasonably Achievable) protocol.  COMPARISON: NONE  FINDINGS: Sagittal datasets reveal normal cervical lordosis without focal subluxation or listhesis. Vertebral body heights are preserved without compression for renal fracture. Facets are well aligned. Lateral masses of C1 well seated on C2. Odontoid process within normal limits. No critical spinal canal narrowing by osseous means. No paraspinal hematoma or paraspinal soft tissue findings and no critical  spinal canal narrowing with facets well aligned. Lung apices grossly clear        No acute osseous findings of the cervical spine.             PROCEDURES    Procedures    No orders to display       MEDICATIONS GIVEN IN ER    Medications - No data to display      PROGRESS, DATA ANALYSIS, CONSULTS, AND MEDICAL DECISION MAKING    All labs have been independently reviewed by me.  All radiology studies have been reviewed by me and the radiologist dictating the report.   EKG's have been independently viewed and interpreted by me.            ED Course as of Aug 15 1812   Sun Aug 15, 2021   1751 AVELINA query complete. Treatment plan to include limited course of prescribed  controlled substance. Risks including addiction, benefits, and alternatives presented to patient.      [AP]      ED Course User Index  [AP] Arvind Timken Esparza,              AS OF 18:12 EDT VITALS:    BP - 126/79  HR - 86  TEMP - 98.2 °F (36.8 °C) (Temporal)  O2 SATS - 99%        DIAGNOSIS  Final diagnoses:   Postconcussive syndrome   Closed head injury, initial encounter         DISPOSITION  DISCHARGE    Patient discharged in stable condition.    Reviewed implications of results, diagnosis, meds, responsibility to follow up, warning signs and symptoms of possible worsening, potential complications and reasons to return to ER.    Patient/Family voiced understanding of above instructions.    Discussed plan for discharge, as there is no emergent indication for admission.  Pt/family is agreeable and understands need for follow up and possible repeat testing.  Pt/family is aware that discharge does not mean that nothing is wrong but that it indicates no emergency is currently present that requires admission and they must continue care with follow-up as given below or with a physician of their choice.     FOLLOW-UP  Pj Samuels, PA  [address] [facility] [address] [phone]    Schedule an appointment as soon as possible for a visit in 2  days  Recheck in 2 days.         Medication List      New Prescriptions    butalbital-acetaminophen-caffeine -40 MG per tablet  Commonly known as: FIORICET, ESGIC  Take 1-2 tablets by mouth Every 6 (Six) Hours As Needed for Headache.           Where to Get Your Medications      These medications were sent to [facility] [address] [place] [address] - [address] [place] [address] [address] [place] - [phone]  - [phone] [place] [id number]    Phone: [phone]   · butalbital-acetaminophen-caffeine -40 MG per tablet                  Choco Carpenter PA-C  08/15/21 2654

## 2021-08-23 ENCOUNTER — OFFICE VISIT (OUTPATIENT)
Dept: FAMILY MEDICINE CLINIC | Facility: CLINIC | Age: 31
End: 2021-08-23

## 2021-08-23 VITALS
HEART RATE: 83 BPM | SYSTOLIC BLOOD PRESSURE: 132 MMHG | HEIGHT: 65 IN | WEIGHT: 150.4 LBS | RESPIRATION RATE: 16 BRPM | BODY MASS INDEX: 25.06 KG/M2 | DIASTOLIC BLOOD PRESSURE: 76 MMHG | OXYGEN SATURATION: 98 % | TEMPERATURE: 98.3 F

## 2021-08-23 DIAGNOSIS — S06.0X0A CONCUSSION WITHOUT LOSS OF CONSCIOUSNESS, INITIAL ENCOUNTER: Primary | ICD-10-CM

## 2021-08-23 PROCEDURE — 99213 OFFICE O/P EST LOW 20 MIN: CPT | Performed by: PHYSICIAN ASSISTANT

## 2021-08-23 NOTE — PATIENT INSTRUCTIONS
Concussion, Adult    A concussion is a brain injury from a hard, direct hit (trauma) to the head or body. This direct hit causes the brain to shake quickly back and forth inside the skull. This can damage brain cells and cause chemical changes in the brain. A concussion may also be known as a mild traumatic brain injury (TBI).  Concussions are usually not life-threatening, but the effects of a concussion can be serious. If you have a concussion, you should be very careful to avoid having a second concussion.  What are the causes?  This condition is caused by:  · A direct hit to your head, such as:  ? Running into another player during a game.  ? Being hit in a fight.  ? Hitting your head on a hard surface.  · Sudden movement of your body that causes your brain to move back and forth inside the skull, such as in a car crash.  What are the signs or symptoms?  The signs of a concussion can be hard to notice. Early on, they may be missed by you, family members, and health care providers. You may look fine on the outside but may act or feel differently.  Every head injury is different. Symptoms are usually temporary but may last for days, weeks, or even months. Some symptoms appear right away, but other symptoms may not show up for hours or days. If your symptoms last longer than normal, you may have post-concussion syndrome.  Physical symptoms  · Headaches.  · Dizziness and problems with coordination or balance.  · Sensitivity to light or noise.  · Nausea or vomiting.  · Tiredness (fatigue).  · Vision or hearing problems.  · Changes in eating or sleeping patterns.  · Seizure.  Mental and emotional symptoms  · Irritability or mood changes.  · Memory problems.  · Trouble concentrating, organizing, or making decisions.  · Slowness in thinking, acting or reacting, speaking, or reading.  · Anxiety or depression.  How is this diagnosed?  This condition is diagnosed based on:  · Your symptoms.  · A description of your  injury.  You may also have tests, including:  · Imaging tests, such as a CT scan or an MRI.  · Neuropsychological tests. These measure your thinking, understanding, learning, and remembering abilities.  How is this treated?  Treatment for this condition includes:  · Stopping sports or activity if you are injured. If you hit your head or show signs of concussion:  ? Do not return to sports or activities the same day.  ? Get checked by a health care provider before you return to your activities.  · Physical and mental rest and careful observation, usually at home. Gradually return to your normal activities.  · Medicines to help with symptoms such as headaches, nausea, or difficulty sleeping.  ? Avoid taking opioid pain medicine while recovering from a concussion.  · Avoiding alcohol and drugs. These may slow your recovery and can put you at risk of further injury.  · Referral to a concussion clinic or rehabilitation center.  Recovery from a concussion can take time. How fast you recover depends on many factors. Return to activities only when:  · Your symptoms are completely gone.  · Your health care provider says that it is safe.  Follow these instructions at home:  Activity  · Limit activities that require a lot of thought or concentration, such as:  ? Doing homework or job-related work.  ? Watching TV.  ? Working on the computer or phone.  ? Playing memory games and puzzles.  · Rest. Rest helps your brain heal. Make sure you:  ? Get plenty of sleep. Most adults should get 7-9 hours of sleep each night.  ? Rest during the day. Take naps or rest breaks when you feel tired.  · Avoid physical activity like exercise until your health care provider says it is safe. Stop any activity that worsens symptoms.  · Do not do high-risk activities that could cause a second concussion, such as riding a bike or playing sports.  · Ask your health care provider when you can return to your normal activities, such as school, work,  athletics, and driving. Your ability to react may be slower after a brain injury. Never do these activities if you are dizzy. Your health care provider will likely give you a plan for gradually returning to activities.  General instructions    · Take over-the-counter and prescription medicines only as told by your health care provider. Some medicines, such as blood thinners (anticoagulants) and aspirin, may increase the risk for complications, such as bleeding.  · Do not drink alcohol until your health care provider says you can.  · Watch your symptoms and tell others around you to do the same. Complications sometimes occur after a concussion. Older adults with a brain injury may have a higher risk of serious complications.  · Tell your , teachers, school nurse, school counselor, , or  about your injury, symptoms, and restrictions.  · Keep all follow-up visits as told by your health care provider. This is important.  How is this prevented?  Avoiding another brain injury is very important. In rare cases, another injury can lead to permanent brain damage, brain swelling, or death. The risk of this is greatest during the first 7-10 days after a head injury. Avoid injuries by:  · Stopping activities that could lead to a second concussion, such as contact or recreational sports, until your health care provider says it is okay.  · Taking these actions once you have returned to sports or activities:  ? Avoiding plays or moves that can cause you to crash into another person. This is how most concussions occur.  ? Following the rules and being respectful of other players. Do not engage in violent or illegal plays.  · Getting regular exercise that includes strength and balance training.  · Wearing a properly fitting helmet during sports, biking, or other activities. Helmets can help protect you from serious skull and brain injuries, but they may not protect you from a concussion. Even when  wearing a helmet, you should avoid being hit in the head.  Contact a health care provider if:  · Your symptoms do not improve.  · You have new symptoms.  · You have another injury.  Get help right away if:  · You have new or worsening physical symptoms, such as:  ? A severe or worsening headache.  ? Weakness or numbness in any part of your body, slurred speech, vision changes, or confusion.  ? Your coordination gets worse.  ? Vomiting repeatedly.  ? You have a seizure.  ? You have unusual behavior changes.  ? You lose consciousness, are sleepier than normal, or are difficult to wake up.  These symptoms may represent a serious problem that is an emergency. Do not wait to see if the symptoms will go away. Get medical help right away. Call your local emergency services (911 in the [place] Do not drive yourself to the hospital.  Summary  · A concussion is a brain injury that results from a hard, direct hit (trauma) to your head or body.  · You may have imaging tests and neuropsychological tests to diagnose a concussion.  · Treatment for this condition includes physical and mental rest and careful observation.  · Ask your health care provider when you can return to your normal activities, such as school, work, athletics, and driving.  · Get help right away if you have a severe headache, weakness in any part of the body, seizures, behavior changes, changes in vision, or if you are confused or sleepier than normal.  This information is not intended to replace advice given to you by your health care provider. Make sure you discuss any questions you have with your health care provider.  Document Revised: 10/29/2020 Document Reviewed: 10/29/2020  Elsevier Patient Education © 2021 [employer]

## 2021-08-23 NOTE — PROGRESS NOTES
Chief Complaint   Patient presents with   • Concussion      f/u from concussion 8-15-21 (jaimeu leticiau)       JOSR Lebron is a 30 y.o. male who is here for ER follow-up.     Patient was evaluated for head injury and concussion at [facility] emergency room on 8/15.  He states he was kneed in the right side of his head during jujitsu at that time. Patient reports he is back to baseline. Perhaps feels slightly fuzzy. He has been resting for the past week. Denies new headaches or worsening neurologic complaints.     Past Medical History:   Diagnosis Date   • Anxiety        History reviewed. No pertinent surgical history.    History reviewed. No pertinent family history.    Social History     Socioeconomic History   • Marital status: Single     Spouse name: Not on file   • Number of children: Not on file   • Years of education: Not on file   • Highest education level: Not on file   Tobacco Use   • Smoking status: Never Smoker   • Smokeless tobacco: Never Used   Substance and Sexual Activity   • Alcohol use: Yes     Comment: socially   • Drug use: Yes     Types: Marijuana     Comment: socially   • Sexual activity: Defer       Allergies   Allergen Reactions   • Prozac [Fluoxetine Hcl] Diarrhea       ROS    Review of Systems   Eyes: Negative for blurred vision.   Musculoskeletal: Negative for gait problem.   Neurological: Negative for dizziness, numbness, headache and memory problem.   Psychiatric/Behavioral: Negative for decreased concentration.       Vitals:    08/23/21 1216   BP: 132/76   Pulse: 83   Resp: 16   Temp: 98.3 °F (36.8 °C)   SpO2: 98%     Body mass index is 25.03 kg/m².      Current Outpatient Medications:   •  butalbital-acetaminophen-caffeine (FIORICET, ESGIC) -40 MG per tablet, Take 1-2 tablets by mouth Every 6 (Six) Hours As Needed for Headache., Disp: 20 tablet, Rfl: 0  •  desvenlafaxine (Pristiq) 50 MG 24 hr tablet, Take 1 tablet by mouth Daily., Disp: 30 tablet, Rfl: 5  •   fluticasone (FLONASE) 50 MCG/ACT nasal spray, , Disp: , Rfl:   •  ipratropium (ATROVENT) 0.06 % nasal spray, USE 1-2 SPRAYS EACH NOSTRIL TWICE DAILY AS NEEDED, Disp: , Rfl:     PE    Physical Exam  Vitals reviewed.   Constitutional:       General: He is not in acute distress.     Appearance: He is well-developed.   HENT:      Head: Normocephalic and atraumatic.      Right Ear: No hemotympanum.      Left Ear: No hemotympanum.   Eyes:      Extraocular Movements: Extraocular movements intact.      Conjunctiva/sclera: Conjunctivae normal.      Pupils: Pupils are equal, round, and reactive to light.   Cardiovascular:      Rate and Rhythm: Normal rate and regular rhythm.      Heart sounds: Normal heart sounds. No murmur heard.     Pulmonary:      Effort: Pulmonary effort is normal.      Breath sounds: Normal breath sounds.   Musculoskeletal:      Cervical back: Normal range of motion.   Skin:     General: Skin is warm and dry.   Neurological:      Mental Status: He is alert.      Cranial Nerves: Cranial nerves are intact. No cranial nerve deficit.      Motor: Motor function is intact.      Gait: Gait normal.      Comments: CN II-XII grossly intact/symmetric   Psychiatric:         Speech: Speech normal.         Behavior: Behavior normal.         Results    Results for orders placed or performed in visit on 06/25/21   Chlamydia trachomatis, Neisseria gonorrhoeae, PCR - Urine, Urine, Clean Catch    Specimen: Urine, Clean Catch   Result Value Ref Range    Chlamydia trachomatis, MONICA Negative Negative    Neisseria gonorrhoeae, MONICA Negative Negative   Hepatitis B Surface Antigen    Specimen: Blood   Result Value Ref Range    Hepatitis B Surface Ag Non-Reactive Non-Reactive   Hepatitis B Surface Antibody    Specimen: Blood   Result Value Ref Range    Hep B S Ab Non-Reactive Non-Reactive   Hepatitis C Antibody    Specimen: Blood   Result Value Ref Range    Hepatitis C Ab Non-Reactive Non-Reactive   HIV-1 / O / 2 Ag / Antibody 4th  Generation    Specimen: Blood   Result Value Ref Range    HIV-1/ HIV-2 Non-Reactive Non-Reactive   RPR    Specimen: Blood   Result Value Ref Range    RPR Non-Reactive Non-Reactive   Lipid Panel    Specimen: Blood   Result Value Ref Range    Total Cholesterol 160 0 - 200 mg/dL    Triglycerides 66 0 - 150 mg/dL    HDL Cholesterol 60 40 - 60 mg/dL    LDL Cholesterol  87 0 - 100 mg/dL    VLDL Cholesterol 13 5 - 40 mg/dL    LDL/HDL Ratio 1.45        A/P    Problem List Items Addressed This Visit     None      Visit Diagnoses     Concussion without loss of consciousness, initial encounter    -  Primary  -Benign neurologic exam today  -Counseled patient to avoid jujitsu for 1 more week. Must be asymptomatic before returning to contact activities. Normal daily activities and light exercise are fine  -He is aware of the need to seek care right away if he has new or worsening neurologic symptoms          Plan of care was reviewed with patient at the conclusion of today's visit. Education was provided regarding diagnoses, management, and the importance of keeping follow-up appointments. The patient was counseled regarding the risks, benefits, and possible side-effects of treatment. Patient and/or family express understanding and agreement with the management plan.        JAMIE Matos

## 2021-10-04 ENCOUNTER — OFFICE VISIT (OUTPATIENT)
Dept: FAMILY MEDICINE CLINIC | Facility: CLINIC | Age: 31
End: 2021-10-04

## 2021-10-04 VITALS
DIASTOLIC BLOOD PRESSURE: 80 MMHG | OXYGEN SATURATION: 96 % | WEIGHT: 151 LBS | HEART RATE: 95 BPM | BODY MASS INDEX: 25.16 KG/M2 | SYSTOLIC BLOOD PRESSURE: 110 MMHG | HEIGHT: 65 IN

## 2021-10-04 DIAGNOSIS — R20.2 LEFT HAND PARESTHESIA: ICD-10-CM

## 2021-10-04 DIAGNOSIS — F33.40 RECURRENT MAJOR DEPRESSIVE DISORDER, IN REMISSION (HCC): Primary | ICD-10-CM

## 2021-10-04 PROCEDURE — 99213 OFFICE O/P EST LOW 20 MIN: CPT | Performed by: PHYSICIAN ASSISTANT

## 2021-10-04 NOTE — PROGRESS NOTES
"    Chief Complaint   Patient presents with   • Medication Problem     finger numbness with pristiq/sexual issues. wants to try another one       HPI     Ronn Lebron is a pleasant 31 y.o. male who presents for evaluation of \"chief complaint.\"     Patient c/o numbness in 2nd through 4th fingers primarily at night the last few nights. He denies pain or weakness. The numbness has since improved today.      He is having sexual side-effects with Pristiq and would like to try something else. It has been working well for his mood. He also has not tolerated wellbutrin, lexapro, and fluoxetine.     Past Medical History:   Diagnosis Date   • Anxiety        History reviewed. No pertinent surgical history.    History reviewed. No pertinent family history.    Social History     Socioeconomic History   • Marital status: Single     Spouse name: Not on file   • Number of children: Not on file   • Years of education: Not on file   • Highest education level: Not on file   Tobacco Use   • Smoking status: Never Smoker   • Smokeless tobacco: Never Used   Substance and Sexual Activity   • Alcohol use: Yes     Comment: socially   • Drug use: Yes     Types: Marijuana     Comment: socially   • Sexual activity: Defer       Allergies   Allergen Reactions   • Lexapro [Escitalopram] Other (See Comments)     fatigue   • Pristiq [Desvenlafaxine] Other (See Comments)     Sexual dysfunction   • Wellbutrin [Bupropion] Other (See Comments)     Agitation, ineffective   • Prozac [Fluoxetine Hcl] Diarrhea       ROS    Review of Systems   Genitourinary: Positive for erectile dysfunction.   Musculoskeletal: Negative for neck pain.   Neurological: Positive for numbness. Negative for weakness.   Psychiatric/Behavioral: Negative for depressed mood.       Vitals:    10/04/21 1117   BP: 110/80   Pulse: 95   SpO2: 96%     Body mass index is 25.13 kg/m².      Current Outpatient Medications:   •  butalbital-acetaminophen-caffeine (FIORICET, ESGIC) -40 " MG per tablet, Take 1-2 tablets by mouth Every 6 (Six) Hours As Needed for Headache., Disp: 20 tablet, Rfl: 0  •  fluticasone (FLONASE) 50 MCG/ACT nasal spray, , Disp: , Rfl:   •  ipratropium (ATROVENT) 0.06 % nasal spray, USE 1-2 SPRAYS EACH NOSTRIL TWICE DAILY AS NEEDED, Disp: , Rfl:   •  Vortioxetine HBr 10 MG tablet, Take 10 mg by mouth Daily., Disp: 30 tablet, Rfl: 5    PE    Physical Exam  Vitals reviewed.   Constitutional:       General: He is not in acute distress.  Pulmonary:      Effort: Pulmonary effort is normal. No respiratory distress.   Neurological:      Mental Status: He is alert.      Motor: Motor function is intact.      Deep Tendon Reflexes:      Reflex Scores:       Bicep reflexes are 1+ on the right side and 1+ on the left side.       Brachioradialis reflexes are 1+ on the right side and 1+ on the left side.     Comments: BUE strength,  5/5. Unable to reproduce left hand paresthesia with wrist flexion and compression   Psychiatric:         Mood and Affect: Mood normal.          A/P    Problem List Items Addressed This Visit        Mental Health    Recurrent major depressive disorder, in remission (HCC) - Primary  -Patient has failed multiple SSRIs - see above  -Will see if Trintellix will be covered by his insurance. Samples provided  -Refer to behavioral health to establish care. Consider DNA testing given multiple medication intolerances  -RTC in 1 month or sooner if needed    Relevant Medications    Vortioxetine HBr 10 MG tablet    Other Relevant Orders    Ambulatory Referral to Behavioral Health      Other Visit Diagnoses     Left hand paresthesia      -?early CTS  -Trial wrist splint at night  -Call/return if symptoms worsen or persist    Relevant Orders     Wrist Hand Orthosis, Wrist Extension Control Cock-up          Plan of care was reviewed with patient at the conclusion of today's visit. Education was provided regarding diagnoses, management, prescribed or recommended OTC  products, and the importance of compliance with follow-up appointments. The patient was counseled regarding the risks, benefits, and possible side-effects of treatment. I advised the patient to keep me informed of any acute changes in their status including new, worsening, or persistent symptoms. Patient expresses understanding and agreement with the management plan.        JAMIE Matos

## 2021-10-04 NOTE — PATIENT INSTRUCTIONS
Start Trintellix 5 mg daily for 7 days then stop Pristiq and increase Trintellix to 10 mg daily and maintain this dose until your next appointment

## 2021-11-25 ENCOUNTER — HOSPITAL ENCOUNTER (EMERGENCY)
Facility: HOSPITAL | Age: 31
Discharge: HOME | End: 2021-11-25
Attending: STUDENT IN AN ORGANIZED HEALTH CARE EDUCATION/TRAINING PROGRAM | Admitting: STUDENT IN AN ORGANIZED HEALTH CARE EDUCATION/TRAINING PROGRAM

## 2021-11-25 VITALS
HEART RATE: 75 BPM | RESPIRATION RATE: 18 BRPM | TEMPERATURE: 98.2 F | OXYGEN SATURATION: 96 % | BODY MASS INDEX: 24.11 KG/M2 | SYSTOLIC BLOOD PRESSURE: 135 MMHG | DIASTOLIC BLOOD PRESSURE: 91 MMHG | WEIGHT: 150 LBS | HEIGHT: 66 IN

## 2021-11-25 DIAGNOSIS — U07.1 COVID-19 VIRUS DETECTED: Primary | ICD-10-CM

## 2021-11-25 LAB
FLUAV RNA RESP QL NAA+PROBE: NOT DETECTED
FLUBV RNA RESP QL NAA+PROBE: NOT DETECTED
SARS-COV-2 RNA RESP QL NAA+PROBE: DETECTED

## 2021-11-25 PROCEDURE — C9803 HOPD COVID-19 SPEC COLLECT: HCPCS

## 2021-11-25 PROCEDURE — 99283 EMERGENCY DEPT VISIT LOW MDM: CPT

## 2021-11-25 PROCEDURE — 87636 SARSCOV2 & INF A&B AMP PRB: CPT | Performed by: STUDENT IN AN ORGANIZED HEALTH CARE EDUCATION/TRAINING PROGRAM

## 2021-12-06 ENCOUNTER — OFFICE VISIT (OUTPATIENT)
Dept: FAMILY MEDICINE CLINIC | Facility: CLINIC | Age: 31
End: 2021-12-06

## 2021-12-06 VITALS
DIASTOLIC BLOOD PRESSURE: 74 MMHG | SYSTOLIC BLOOD PRESSURE: 110 MMHG | WEIGHT: 161.6 LBS | HEIGHT: 66 IN | HEART RATE: 71 BPM | OXYGEN SATURATION: 97 % | BODY MASS INDEX: 25.97 KG/M2 | RESPIRATION RATE: 18 BRPM | TEMPERATURE: 97.6 F

## 2021-12-06 DIAGNOSIS — U07.1 COVID-19 VIRUS INFECTION: Primary | ICD-10-CM

## 2021-12-06 DIAGNOSIS — R52 GENERALIZED BODY ACHES: ICD-10-CM

## 2021-12-06 DIAGNOSIS — Z20.828 EXPOSURE TO THE FLU: ICD-10-CM

## 2021-12-06 DIAGNOSIS — J01.00 ACUTE MAXILLARY SINUSITIS, RECURRENCE NOT SPECIFIED: ICD-10-CM

## 2021-12-06 LAB
EXPIRATION DATE: NORMAL
FLUAV AG NPH QL: NEGATIVE
FLUBV AG NPH QL: NEGATIVE
INTERNAL CONTROL: NORMAL
Lab: NORMAL

## 2021-12-06 PROCEDURE — 87804 INFLUENZA ASSAY W/OPTIC: CPT | Performed by: PHYSICIAN ASSISTANT

## 2021-12-06 PROCEDURE — 99213 OFFICE O/P EST LOW 20 MIN: CPT | Performed by: PHYSICIAN ASSISTANT

## 2021-12-06 RX ORDER — CEFDINIR 300 MG/1
300 CAPSULE ORAL 2 TIMES DAILY
Qty: 20 CAPSULE | Refills: 0 | Status: SHIPPED | OUTPATIENT
Start: 2021-12-06 | End: 2021-12-27

## 2021-12-06 NOTE — PROGRESS NOTES
"    Chief Complaint   Patient presents with   • Generalized Body Aches   • URI   • Positive for Covid 11/25/21       HPI     Ronn Lebron is a pleasant 31 y.o. male who presents for evaluation of \"chief complaint.\" Patient tested positive for COVID-19 on Thanksgiving day. His girlfriend also tested positive for COVID and was also positive for influenza B earlier today at this office. He states his symptoms have improved. He has experienced body aches the last few days which are new. He has yellow-green nasal discharge and sinus pressure. Denies wheezing, soa, chest pain, n/v/d.      Past Medical History:   Diagnosis Date   • Anxiety        History reviewed. No pertinent surgical history.    History reviewed. No pertinent family history.    Social History     Socioeconomic History   • Marital status: Single   Tobacco Use   • Smoking status: Never Smoker   • Smokeless tobacco: Never Used   Vaping Use   • Vaping Use: Never used   Substance and Sexual Activity   • Alcohol use: Yes     Comment: socially   • Drug use: Yes     Types: Marijuana     Comment: socially   • Sexual activity: Defer       Allergies   Allergen Reactions   • Lexapro [Escitalopram] Other (See Comments)     fatigue   • Pristiq [Desvenlafaxine] Other (See Comments)     Sexual dysfunction   • Wellbutrin [Bupropion] Other (See Comments)     Agitation, ineffective   • Prozac [Fluoxetine Hcl] Diarrhea       ROS    Review of Systems   Constitutional: Positive for fatigue. Negative for chills and fever.   HENT: Positive for congestion, postnasal drip, sinus pressure and sore throat.    Respiratory: Positive for cough. Negative for shortness of breath and wheezing.    Gastrointestinal: Negative for diarrhea and vomiting.   Musculoskeletal: Positive for arthralgias and myalgias.   Neurological: Negative for headache.       Vitals:    12/06/21 1359   BP: 110/74   Pulse: 71   Resp: 18   Temp: 97.6 °F (36.4 °C)   SpO2: 97%     Body mass index is 26.1 " kg/m².      Current Outpatient Medications:   •  butalbital-acetaminophen-caffeine (FIORICET, ESGIC) -40 MG per tablet, Take 1-2 tablets by mouth Every 6 (Six) Hours As Needed for Headache., Disp: 20 tablet, Rfl: 0  •  fluticasone (FLONASE) 50 MCG/ACT nasal spray, , Disp: , Rfl:   •  ipratropium (ATROVENT) 0.06 % nasal spray, USE 1-2 SPRAYS EACH NOSTRIL TWICE DAILY AS NEEDED, Disp: , Rfl:   •  Vortioxetine HBr 10 MG tablet, Take 10 mg by mouth Daily., Disp: 30 tablet, Rfl: 5  •  cefdinir (OMNICEF) 300 MG capsule, Take 1 capsule by mouth 2 (Two) Times a Day., Disp: 20 capsule, Rfl: 0    PE    Physical Exam  Vitals reviewed.   Constitutional:       General: He is not in acute distress.     Appearance: He is well-developed.   HENT:      Head: Normocephalic.      Right Ear: Tympanic membrane and ear canal normal. Tympanic membrane is not erythematous.      Left Ear: Tympanic membrane and ear canal normal. Tympanic membrane is not erythematous.      Nose:      Right Sinus: Maxillary sinus tenderness present. No frontal sinus tenderness.      Left Sinus: Maxillary sinus tenderness present. No frontal sinus tenderness.      Mouth/Throat:      Pharynx: Uvula midline. Posterior oropharyngeal erythema present.      Tonsils: No tonsillar exudate.   Eyes:      General:         Right eye: No discharge.         Left eye: No discharge.      Conjunctiva/sclera: Conjunctivae normal.   Cardiovascular:      Rate and Rhythm: Normal rate and regular rhythm.      Heart sounds: Normal heart sounds.   Pulmonary:      Effort: Pulmonary effort is normal. No respiratory distress.      Breath sounds: Normal breath sounds. No wheezing, rhonchi or rales.   Chest:   Breasts:      Right: No supraclavicular adenopathy.      Left: No supraclavicular adenopathy.       Musculoskeletal:      Cervical back: Normal range of motion and neck supple.   Lymphadenopathy:      Cervical: No cervical adenopathy.      Upper Body:      Right upper body: No  supraclavicular adenopathy.      Left upper body: No supraclavicular adenopathy.   Skin:     General: Skin is warm and dry.   Psychiatric:         Behavior: Behavior normal.          A/P    Problem List Items Addressed This Visit     None      Visit Diagnoses     COVID-19 virus infection    -  Primary  -Symptoms overall improving  -Afebrile, lungs CTA, 02 sat WNL  -Discussed symptomatic/supportive management  -Counseled patient to go the ER if he has shortness of breath associated with oxygen saturation persistently less than 94%      Acute maxillary sinusitis, recurrence not specified      -Treat with cefdinir to cover for secondary sinusitis   -Encouraged mucinex-D for congestion      Generalized body aches        Relevant Orders    POCT Influenza A/B (Completed)    Exposure to the flu        Relevant Orders    POCT Influenza A/B (Completed)          Plan of care was reviewed with patient at the conclusion of today's visit. Education was provided regarding diagnoses, management, prescribed or recommended OTC products, and the importance of compliance with follow-up appointments. The patient was counseled regarding the risks, benefits, and possible side-effects of treatment. I advised the patient to keep me informed of any acute changes in their status including new, worsening, or persistent symptoms. Patient expresses understanding and agreement with the management plan.        JAMIE Matos

## 2021-12-06 NOTE — PATIENT INSTRUCTIONS
"· Attain adequate rest and increase clear fluid intake.   · Practice regular hand hygiene and cover your cough to help prevent spread of infection  · Use warm salt water gargles, lozenges, and hot tea with honey as needed for throat comfort.   · Use Mucinex-D according to package instructions and \"ocean\" or \"simply saline\" brand sterile nasal spray twice daily.   · Use warm compresses over sinuses for comfort as needed  · Alternate taking Tylenol 500 mg and Ibuprofen 400 mg every 4 hours as needed for headache, body aches, throat pain, and/or fever reduction  · Please keep me informed of any acute changes in your status including new or worsening symptoms.   "

## 2021-12-27 ENCOUNTER — OFFICE VISIT (OUTPATIENT)
Dept: FAMILY MEDICINE CLINIC | Facility: CLINIC | Age: 31
End: 2021-12-27

## 2021-12-27 VITALS
HEART RATE: 96 BPM | OXYGEN SATURATION: 98 % | WEIGHT: 161 LBS | HEIGHT: 66 IN | BODY MASS INDEX: 25.88 KG/M2 | DIASTOLIC BLOOD PRESSURE: 82 MMHG | SYSTOLIC BLOOD PRESSURE: 118 MMHG

## 2021-12-27 DIAGNOSIS — J30.9 ALLERGIC RHINITIS, UNSPECIFIED SEASONALITY, UNSPECIFIED TRIGGER: ICD-10-CM

## 2021-12-27 DIAGNOSIS — R11.0 NAUSEA: Primary | ICD-10-CM

## 2021-12-27 DIAGNOSIS — R09.82 POSTNASAL DRIP: ICD-10-CM

## 2021-12-27 PROCEDURE — 99213 OFFICE O/P EST LOW 20 MIN: CPT | Performed by: PHYSICIAN ASSISTANT

## 2021-12-27 RX ORDER — ONDANSETRON 4 MG/1
4 TABLET, ORALLY DISINTEGRATING ORAL EVERY 8 HOURS PRN
Qty: 21 TABLET | Refills: 0 | Status: SHIPPED | OUTPATIENT
Start: 2021-12-27 | End: 2022-06-28

## 2021-12-27 RX ORDER — PSEUDOEPHEDRINE HCL 30 MG
30 TABLET ORAL EVERY 4 HOURS PRN
Qty: 30 TABLET | Refills: 0 | Status: SHIPPED | OUTPATIENT
Start: 2021-12-27 | End: 2022-06-28

## 2021-12-27 NOTE — PROGRESS NOTES
"    Chief Complaint   Patient presents with   • Nausea     sx since covid. Ongoing       HPI     Ronn Lebron is a pleasant 31 y.o. male who presents for evaluation of \"chief complaint.\"     Patient c/o intermittent nausea for 4-5 hours, usually resolved by the evenings. Began after COVID infection 1 month ago. He states it is slightly better since then. He denies new medications. He does have postnasal drip which has been increased in the last week. He follows with an allergist. He has been off his nasal sprays. He denies other associated symptoms. Occasional marijuana use once monthly.     Past Medical History:   Diagnosis Date   • Anxiety        History reviewed. No pertinent surgical history.    History reviewed. No pertinent family history.    Social History     Socioeconomic History   • Marital status: Single   Tobacco Use   • Smoking status: Never Smoker   • Smokeless tobacco: Never Used   Vaping Use   • Vaping Use: Never used   Substance and Sexual Activity   • Alcohol use: Yes     Comment: socially   • Drug use: Yes     Types: Marijuana     Comment: socially   • Sexual activity: Defer       Allergies   Allergen Reactions   • Lexapro [Escitalopram] Other (See Comments)     fatigue   • Pristiq [Desvenlafaxine] Other (See Comments)     Sexual dysfunction   • Wellbutrin [Bupropion] Other (See Comments)     Agitation, ineffective   • Prozac [Fluoxetine Hcl] Diarrhea       ROS    Review of Systems   Constitutional: Negative for fever.   HENT: Positive for congestion, postnasal drip and sore throat.    Respiratory: Positive for cough. Negative for shortness of breath and wheezing.    Gastrointestinal: Positive for nausea. Negative for abdominal pain, constipation, diarrhea, vomiting and GERD.       Vitals:    12/27/21 1334   BP: 118/82   Pulse: 96   SpO2: 98%     Body mass index is 26 kg/m².      Current Outpatient Medications:   •  butalbital-acetaminophen-caffeine (FIORICET, ESGIC) -40 MG per tablet, " Take 1-2 tablets by mouth Every 6 (Six) Hours As Needed for Headache., Disp: 20 tablet, Rfl: 0  •  fluticasone (FLONASE) 50 MCG/ACT nasal spray, , Disp: , Rfl:   •  ipratropium (ATROVENT) 0.06 % nasal spray, USE 1-2 SPRAYS EACH NOSTRIL TWICE DAILY AS NEEDED, Disp: , Rfl:   •  Vortioxetine HBr 10 MG tablet, Take 10 mg by mouth Daily., Disp: 30 tablet, Rfl: 5  •  ondansetron ODT (Zofran ODT) 4 MG disintegrating tablet, Place 1 tablet on the tongue Every 8 (Eight) Hours As Needed for Nausea or Vomiting., Disp: 21 tablet, Rfl: 0  •  pseudoephedrine (Sudafed) 30 MG tablet, Take 1 tablet by mouth Every 4 (Four) Hours As Needed (nasal congestion)., Disp: 30 tablet, Rfl: 0    PE    Physical Exam  Vitals reviewed.   Constitutional:       General: He is not in acute distress.     Appearance: He is well-developed.   HENT:      Head: Normocephalic.      Right Ear: Tympanic membrane and ear canal normal. Tympanic membrane is not erythematous.      Left Ear: Tympanic membrane and ear canal normal. Tympanic membrane is not erythematous.      Nose:      Left Turbinates: Swollen.      Right Sinus: No maxillary sinus tenderness or frontal sinus tenderness.      Left Sinus: No maxillary sinus tenderness or frontal sinus tenderness.      Mouth/Throat:      Pharynx: Oropharynx is clear. Uvula midline. No posterior oropharyngeal erythema.      Tonsils: No tonsillar exudate.   Eyes:      General:         Right eye: No discharge.         Left eye: No discharge.      Conjunctiva/sclera: Conjunctivae normal.   Cardiovascular:      Rate and Rhythm: Normal rate and regular rhythm.      Heart sounds: Normal heart sounds.   Pulmonary:      Effort: Pulmonary effort is normal. No respiratory distress.      Breath sounds: Normal breath sounds. No wheezing, rhonchi or rales.   Chest:   Breasts:      Right: No supraclavicular adenopathy.      Left: No supraclavicular adenopathy.       Abdominal:      Palpations: Abdomen is soft.      Tenderness:  There is no abdominal tenderness.   Musculoskeletal:      Cervical back: Normal range of motion and neck supple.   Lymphadenopathy:      Cervical: No cervical adenopathy.      Upper Body:      Right upper body: No supraclavicular adenopathy.      Left upper body: No supraclavicular adenopathy.   Skin:     General: Skin is warm and dry.   Psychiatric:         Behavior: Behavior normal.          A/P    Problem List Items Addressed This Visit        Allergies and Adverse Reactions    Allergic rhinitis      Other Visit Diagnoses     Nausea    -  Primary  -Benign abdominal exam today   -Began after COVID infection 1 month ago  -May be associated with postnasal drainage  -Recommended patient resume nasal sprays daily  -Rx Zofran to use as needed  -Counseled patient to seek evaluation if he has worsening or persistent symptoms      Postnasal drip              Plan of care was reviewed with patient at the conclusion of today's visit. Education was provided regarding diagnoses, management, prescribed or recommended OTC products, and the importance of compliance with follow-up appointments. The patient was counseled regarding the risks, benefits, and possible side-effects of treatment. I advised the patient to keep me informed of any acute changes in their status including new, worsening, or persistent symptoms. Patient expresses understanding and agreement with the management plan.        JAMIE Matos

## 2022-02-07 NOTE — TELEPHONE ENCOUNTER
Rx Refill Note  Requested Prescriptions     Pending Prescriptions Disp Refills   • SudoGest 30 MG tablet [Pharmacy Med Name: SUDOGEST 30 MG TABLET] 30 tablet 0     Sig: TAKE ONE TABLET BY MOUTH EVERY 4 HOURS AS NEEDED FOR NASAL CONGESTION      Last office visit with prescribing clinician: 12/27/2021      Next office visit with prescribing clinician: Visit date not found            Dona Larsen MA  02/07/22, 09:38 EST     TRIED CALLING PATIENT NO ANSWER VM NOT SET UP

## 2022-02-09 RX ORDER — PSEUDOEPHEDRINE HCL 30 MG/1
TABLET, FILM COATED ORAL
Qty: 30 TABLET | Refills: 0 | OUTPATIENT
Start: 2022-02-09

## 2022-04-08 RX ORDER — DESVENLAFAXINE SUCCINATE 50 MG/1
TABLET, EXTENDED RELEASE ORAL
Qty: 30 TABLET | Refills: 3 | Status: SHIPPED | OUTPATIENT
Start: 2022-04-08 | End: 2022-06-28

## 2022-04-08 NOTE — TELEPHONE ENCOUNTER
Rx Refill Note  Requested Prescriptions     Pending Prescriptions Disp Refills   • desvenlafaxine (PRISTIQ) 50 MG 24 hr tablet [Pharmacy Med Name: DESVENLAFAXINE SUCCNT ER 50 MG] 30 tablet 5     Sig: TAKE ONE TABLET BY MOUTH DAILY      Last office visit with prescribing clinician: 12/27/2021      Next office visit with prescribing clinician: Visit date not found            Dona Larsen MA  04/08/22, 14:15 EDT     desvenlafaxine (PRISTIQ) 50 MG 24 hr tablet     The original prescription was discontinued on 10/4/2021 by Pj Samuels PA. Renewing this prescription may not be appropriate.     Called and spoke with patient he did stop the Desvenlafaxine and start Trintellix however he did not like the way the Trintellix made him feel so he went back on Desvenlafaxine and is needed a refill, would you like for him to schedule a follow up appointment first?

## 2022-06-28 ENCOUNTER — OFFICE VISIT (OUTPATIENT)
Dept: FAMILY MEDICINE CLINIC | Facility: CLINIC | Age: 32
End: 2022-06-28

## 2022-06-28 ENCOUNTER — LAB (OUTPATIENT)
Dept: LAB | Facility: HOSPITAL | Age: 32
End: 2022-06-28

## 2022-06-28 VITALS
HEART RATE: 81 BPM | WEIGHT: 157 LBS | SYSTOLIC BLOOD PRESSURE: 118 MMHG | BODY MASS INDEX: 25.23 KG/M2 | DIASTOLIC BLOOD PRESSURE: 80 MMHG | HEIGHT: 66 IN | OXYGEN SATURATION: 97 %

## 2022-06-28 DIAGNOSIS — R41.840 CONCENTRATION DEFICIT: ICD-10-CM

## 2022-06-28 DIAGNOSIS — F33.40 RECURRENT MAJOR DEPRESSIVE DISORDER, IN REMISSION: ICD-10-CM

## 2022-06-28 DIAGNOSIS — H53.9 VISION CHANGES: ICD-10-CM

## 2022-06-28 DIAGNOSIS — Z80.8 FAMILY HISTORY OF MALIGNANT MELANOMA: ICD-10-CM

## 2022-06-28 DIAGNOSIS — Z00.00 PREVENTATIVE HEALTH CARE: ICD-10-CM

## 2022-06-28 DIAGNOSIS — Z00.00 PREVENTATIVE HEALTH CARE: Primary | ICD-10-CM

## 2022-06-28 DIAGNOSIS — F41.9 ANXIETY: ICD-10-CM

## 2022-06-28 DIAGNOSIS — R53.83 FATIGUE, UNSPECIFIED TYPE: ICD-10-CM

## 2022-06-28 LAB
ALBUMIN SERPL-MCNC: 5.3 G/DL (ref 3.5–5.2)
ALBUMIN/GLOB SERPL: 2.2 G/DL
ALP SERPL-CCNC: 53 U/L (ref 39–117)
ALT SERPL W P-5'-P-CCNC: 18 U/L (ref 1–41)
ANION GAP SERPL CALCULATED.3IONS-SCNC: 10.5 MMOL/L (ref 5–15)
AST SERPL-CCNC: 26 U/L (ref 1–40)
BILIRUB SERPL-MCNC: 0.6 MG/DL (ref 0–1.2)
BUN SERPL-MCNC: 20 MG/DL (ref 6–20)
BUN/CREAT SERPL: 18.3 (ref 7–25)
CALCIUM SPEC-SCNC: 10.9 MG/DL (ref 8.6–10.5)
CHLORIDE SERPL-SCNC: 98 MMOL/L (ref 98–107)
CO2 SERPL-SCNC: 28.5 MMOL/L (ref 22–29)
CREAT SERPL-MCNC: 1.09 MG/DL (ref 0.76–1.27)
DEPRECATED RDW RBC AUTO: 38.6 FL (ref 37–54)
EGFRCR SERPLBLD CKD-EPI 2021: 94.2 ML/MIN/1.73
ERYTHROCYTE [DISTWIDTH] IN BLOOD BY AUTOMATED COUNT: 12.1 % (ref 12.3–15.4)
GLOBULIN UR ELPH-MCNC: 2.4 GM/DL
GLUCOSE SERPL-MCNC: 90 MG/DL (ref 65–99)
HCT VFR BLD AUTO: 45.9 % (ref 37.5–51)
HGB BLD-MCNC: 15.6 G/DL (ref 13–17.7)
MCH RBC QN AUTO: 29.7 PG (ref 26.6–33)
MCHC RBC AUTO-ENTMCNC: 34 G/DL (ref 31.5–35.7)
MCV RBC AUTO: 87.4 FL (ref 79–97)
PLATELET # BLD AUTO: 314 10*3/MM3 (ref 140–450)
PMV BLD AUTO: 10.1 FL (ref 6–12)
POTASSIUM SERPL-SCNC: 4.8 MMOL/L (ref 3.5–5.2)
PROT SERPL-MCNC: 7.7 G/DL (ref 6–8.5)
RBC # BLD AUTO: 5.25 10*6/MM3 (ref 4.14–5.8)
SODIUM SERPL-SCNC: 137 MMOL/L (ref 136–145)
TSH SERPL DL<=0.05 MIU/L-ACNC: 1.61 UIU/ML (ref 0.27–4.2)
WBC NRBC COR # BLD: 5.75 10*3/MM3 (ref 3.4–10.8)

## 2022-06-28 PROCEDURE — 84443 ASSAY THYROID STIM HORMONE: CPT | Performed by: PHYSICIAN ASSISTANT

## 2022-06-28 PROCEDURE — 80053 COMPREHEN METABOLIC PANEL: CPT | Performed by: PHYSICIAN ASSISTANT

## 2022-06-28 PROCEDURE — 3008F BODY MASS INDEX DOCD: CPT | Performed by: PHYSICIAN ASSISTANT

## 2022-06-28 PROCEDURE — 2014F MENTAL STATUS ASSESS: CPT | Performed by: PHYSICIAN ASSISTANT

## 2022-06-28 PROCEDURE — 99395 PREV VISIT EST AGE 18-39: CPT | Performed by: PHYSICIAN ASSISTANT

## 2022-06-28 PROCEDURE — 99213 OFFICE O/P EST LOW 20 MIN: CPT | Performed by: PHYSICIAN ASSISTANT

## 2022-06-28 PROCEDURE — 85027 COMPLETE CBC AUTOMATED: CPT | Performed by: PHYSICIAN ASSISTANT

## 2022-06-28 RX ORDER — DESVENLAFAXINE SUCCINATE 50 MG/1
50 TABLET, EXTENDED RELEASE ORAL DAILY
Qty: 90 TABLET | Refills: 3 | Status: SHIPPED | OUTPATIENT
Start: 2022-06-28 | End: 2022-11-15

## 2022-06-28 RX ORDER — CHLORCYCLIZINE HYDROCHLORIDE AND PSEUDOEPHEDRINE HYDROCHLORIDE 25; 60 MG/1; MG/1
1 TABLET ORAL EVERY 12 HOURS PRN
COMMUNITY
Start: 2022-06-15 | End: 2022-07-29

## 2022-06-28 NOTE — PROGRESS NOTES
Reason for visit    Ronn Lebron is a 31 y.o. male who presents for annual comprehensive exam.    Chief Complaint   Patient presents with   • Annual Exam       HPI     Here for physical.   He notes fatigue for the past 9-10 months. He questions if this may be from a concussion in November. His fatigue became more noticeable at that time.   He states his mood is good on Pristiq. He has difficulty focusing and memory issues. Cannot remember what his girlfriend told him about her schedule from the day before. He feels he has had symptoms his whole life however they have worsened more recently. He was treated for ADHD as a child and in high school but states medications made him feel crazy.     Diet/Physical activity:  -Reports healthy diet  -Jujitsu 2 hours daily, isometric exercises at home     Immunizations:  -He reports a tetanus vaccine in the past 10 years  -He would like to get his COVID booster on a different day    Cancer screening:   -Positive Fhx: breast breast cancer (mat aunt), melanoma (father)  -Negative Fhx: testicular, prostate, colon cancer, colon polyps    Depression: PHQ-2 Depression Screening  -Negative    Substance use:  -Rare alcohol use once every 2 months, 1-2 beers  -Denies tobacco or drug use. He takes [facility] gummies which seem to help with anxiety  -1 cup coffee in the morning    Sexual health:  -Denies new sexual partners in the past 1 year  -Always used barrier protection    Dental/vision/skin:  -He states his glasses broke. Requesting referral to ophthalmology. It has been several years since his last eye exam  -Asks for referral due to his family history of melanoma    Past Medical History:   Diagnosis Date   • Anxiety        History reviewed. No pertinent surgical history.    History reviewed. No pertinent family history.    Social History     Socioeconomic History   • Marital status: Single   Tobacco Use   • Smoking status: Never Smoker   • Smokeless tobacco: Never Used   Vaping Use   •  Vaping Use: Never used   Substance and Sexual Activity   • Alcohol use: Yes     Comment: socially   • Drug use: Yes     Types: Marijuana     Comment: socially   • Sexual activity: Defer       Allergies   Allergen Reactions   • Lexapro [Escitalopram] Other (See Comments)     fatigue   • Pristiq [Desvenlafaxine] Other (See Comments)     Sexual dysfunction   • Wellbutrin [Bupropion] Other (See Comments)     Agitation, ineffective   • Prozac [Fluoxetine Hcl] Diarrhea       ROS    Review of Systems   Constitutional: Positive for fatigue. Negative for appetite change and unexpected weight loss.   HENT: Positive for postnasal drip. Negative for congestion, hearing loss, sore throat and trouble swallowing.    Eyes: Positive for blurred vision.   Respiratory: Negative for cough and shortness of breath.    Cardiovascular: Negative for chest pain.   Gastrointestinal: Negative for abdominal pain, blood in stool, constipation, diarrhea, nausea and GERD.   Endocrine: Negative for cold intolerance and heat intolerance.   Genitourinary: Negative for difficulty urinating, dysuria, genital sores, hematuria, erectile dysfunction, scrotal swelling and testicular pain.   Musculoskeletal: Negative for arthralgias and myalgias.   Skin: Negative for rash and skin lesions.   Neurological: Positive for memory problem. Negative for dizziness, numbness and headache.   Psychiatric/Behavioral: Positive for decreased concentration. Negative for suicidal ideas and depressed mood. The patient is nervous/anxious.        Vitals:    06/28/22 1402   BP: 118/80   Pulse: 81   SpO2: 97%     Body mass index is 25.35 kg/m².      Current Outpatient Medications:   •  butalbital-acetaminophen-caffeine (FIORICET, ESGIC) -40 MG per tablet, Take 1-2 tablets by mouth Every 6 (Six) Hours As Needed for Headache., Disp: 20 tablet, Rfl: 0  •  desvenlafaxine (PRISTIQ) 50 MG 24 hr tablet, Take 1 tablet by mouth Daily., Disp: 90 tablet, Rfl: 3  •  fluticasone  (FLONASE) 50 MCG/ACT nasal spray, , Disp: , Rfl:   •  ipratropium (ATROVENT) 0.06 % nasal spray, USE 1-2 SPRAYS EACH NOSTRIL TWICE DAILY AS NEEDED, Disp: , Rfl:   •  Stahist AD 25-60 MG tablet, Take 1 tablet by mouth Every 12 (Twelve) Hours As Needed., Disp: , Rfl:     PE    Physical Exam  Vitals reviewed.   Constitutional:       General: He is not in acute distress.     Appearance: He is well-developed.   HENT:      Head: Normocephalic and atraumatic.      Right Ear: Hearing and tympanic membrane normal.      Left Ear: Hearing and tympanic membrane normal.      Mouth/Throat:      Mouth: Mucous membranes are moist.      Dentition: Normal dentition.      Pharynx: Oropharynx is clear.   Eyes:      Extraocular Movements: Extraocular movements intact.      Conjunctiva/sclera: Conjunctivae normal.      Pupils: Pupils are equal, round, and reactive to light.      Comments:      Neck:      Thyroid: No thyroid mass or thyromegaly.      Vascular: No carotid bruit.   Cardiovascular:      Rate and Rhythm: Normal rate and regular rhythm.      Heart sounds: No murmur heard.    No friction rub.   Pulmonary:      Effort: Pulmonary effort is normal.      Breath sounds: Normal breath sounds.   Chest:   Breasts:      Right: No supraclavicular adenopathy.      Left: No supraclavicular adenopathy.       Abdominal:      General: Bowel sounds are normal. There is no abdominal bruit.      Palpations: Abdomen is soft. There is no mass.      Tenderness: There is no abdominal tenderness.   Musculoskeletal:         General: Normal range of motion.      Cervical back: Normal range of motion and neck supple.   Lymphadenopathy:      Cervical: No cervical adenopathy.      Upper Body:      Right upper body: No supraclavicular adenopathy.      Left upper body: No supraclavicular adenopathy.   Skin:     General: Skin is warm and dry.      Findings: No rash.      Nails: There is no clubbing.      Comments: No suspicious nevi   Neurological:       Mental Status: He is alert.      Gait: Gait normal.      Deep Tendon Reflexes: Reflexes normal.   Psychiatric:         Speech: Speech normal.         Behavior: Behavior normal.         A/P    Problem List Items Addressed This Visit        Family History    Family history of malignant melanoma    Relevant Orders    Ambulatory Referral to Dermatology       Mental Health    Recurrent major depressive disorder, in remission (HCC)  -Doing well on Pristiq    Relevant Medications    desvenlafaxine (PRISTIQ) 50 MG 24 hr tablet    Other Relevant Orders    Ambulatory Referral to Behavioral Health    Anxiety    Relevant Orders    Ambulatory Referral to Behavioral Health      Other Visit Diagnoses     Preventative health care    -  Primary  -Counseled patient regarding cancer screening, immunizations, healthy lifestyle, diet, maintaining a healthy weight, and exercise.   -Annual dental and eye exams were encouraged.    Relevant Orders    CBC (No Diff)    Comprehensive Metabolic Panel    TSH Rfx On Abnormal To Free T4    Concentration deficit      -Refer to behavioral health for further evaluation and management   -CT of his head was normal in August 2021 after Jujitsu injury  -I suspect his ?ADHD and concentration difficulty is contributing to perceived memory changes    Relevant Orders    Ambulatory Referral to Behavioral Health    Fatigue, unspecified type      -Unclear etiology at this time  -?postconcussive syndrome  -Check labs      Vision changes        Relevant Orders    Ambulatory Referral to Ophthalmology          Plan of care was reviewed with patient at the conclusion of today's visit. Education was provided regarding diagnoses, management, treatment plan, and the importance of keeping follow-up appointments. The patient was counseled regarding the risks, benefits, and possible side-effects of treatment. Patient and/or family expresses understanding and agreement with the management plan.        Pj Samuels,  PA

## 2022-07-18 RX ORDER — CHLORCYCLIZINE HYDROCHLORIDE AND PSEUDOEPHEDRINE HYDROCHLORIDE 25; 60 MG/1; MG/1
1 TABLET ORAL EVERY 12 HOURS PRN
Qty: 42 TABLET | OUTPATIENT
Start: 2022-07-18

## 2022-07-18 NOTE — TELEPHONE ENCOUNTER
Caller: Ronn Lebron    Relationship: Self    Best call back number: [phone]   Requested Prescriptions:   Requested Prescriptions     Pending Prescriptions Disp Refills   • Stahist AD 25-60 MG tablet 42 tablet      Sig: Take 1 tablet by mouth Every 12 (Twelve) Hours As Needed.        Pharmacy where request should be sent: [id number] [address] [facility] [phone] [facility] [phone] FX     Additional details provided by patient:   Does the patient have less than a 3 day supply:  [x] Yes  [] No    Joshua QURESHI Rep   07/18/22 14:16 EDT

## 2022-07-28 NOTE — TELEPHONE ENCOUNTER
PATIENT CALLED TO CHECK STATUS OF MEDICATION REQUEST.  MEDICATION WAS PREVIOUSLY PRESCRIBED BY ALLERGIST BUT HE NO LONGER SEES THEM AND WOULD LIKE THIS MEDICATION.     PLEASE CALL [phone]

## 2022-07-29 RX ORDER — CHLORCYCLIZINE HYDROCHLORIDE AND PSEUDOEPHEDRINE HYDROCHLORIDE 25; 60 MG/1; MG/1
TABLET ORAL
Qty: 60 TABLET | Refills: 2 | Status: SHIPPED | OUTPATIENT
Start: 2022-07-29

## 2022-07-29 NOTE — TELEPHONE ENCOUNTER
Rx Refill Note  Requested Prescriptions     Pending Prescriptions Disp Refills   • Stahist AD 25-60 MG tablet [Pharmacy Med Name: STAHIST AD 25-60MG TABLETS] 60 tablet      Sig: TAKE 1 TABLET BY MOUTH EVERY 12 HOURS AS NEEDED      Last office visit with prescribing clinician: 6/28/2022      Next office visit with prescribing clinician: 12/28/2022            Leonie Orozco MA  07/29/22, 08:47 EDT

## 2022-09-19 ENCOUNTER — TELEPHONE (OUTPATIENT)
Dept: FAMILY MEDICINE CLINIC | Facility: CLINIC | Age: 32
End: 2022-09-19

## 2022-09-22 DIAGNOSIS — J34.2 DEVIATED SEPTUM: Primary | ICD-10-CM

## 2022-11-15 RX ORDER — DESVENLAFAXINE SUCCINATE 50 MG/1
TABLET, EXTENDED RELEASE ORAL
Qty: 30 TABLET | Refills: 0 | Status: SHIPPED | OUTPATIENT
Start: 2022-11-15 | End: 2023-01-09

## 2023-01-06 NOTE — TELEPHONE ENCOUNTER
Rx Refill Note  Requested Prescriptions     Pending Prescriptions Disp Refills   • desvenlafaxine (PRISTIQ) 50 MG 24 hr tablet [Pharmacy Med Name: DESVENLAFAXINE SUCCNT ER 50 MG] 30 tablet 0     Sig: TAKE ONE TABLET BY MOUTH DAILY      Last office visit with prescribing clinician: 6/28/2022   Last telemedicine visit with prescribing clinician: Visit date not found   Next office visit with prescribing clinician: Visit date not found                         Would you like a call back once the refill request has been completed: [] Yes [] No    If the office needs to give you a call back, can they leave a voicemail: [] Yes [] No    Leonie Orozco MA  01/06/23, 08:51 EST      NO VM SET UP       Return in about 6 months (around 12/28/2022), or if symptoms worsen or fail to improve, for Depression follow-up, also nurse visit for COVID booster and 1st hep B vaccine.    HUB CAN RELY MESSAGE AND SCHEDULE

## 2023-01-09 RX ORDER — DESVENLAFAXINE SUCCINATE 50 MG/1
TABLET, EXTENDED RELEASE ORAL
Qty: 30 TABLET | Refills: 0 | Status: SHIPPED | OUTPATIENT
Start: 2023-01-09 | End: 2023-01-10 | Stop reason: SDUPTHER

## 2023-01-10 ENCOUNTER — OFFICE VISIT (OUTPATIENT)
Dept: FAMILY MEDICINE CLINIC | Facility: CLINIC | Age: 33
End: 2023-01-10
Payer: COMMERCIAL

## 2023-01-10 VITALS
WEIGHT: 149 LBS | SYSTOLIC BLOOD PRESSURE: 122 MMHG | BODY MASS INDEX: 23.95 KG/M2 | HEIGHT: 66 IN | OXYGEN SATURATION: 99 % | DIASTOLIC BLOOD PRESSURE: 78 MMHG | TEMPERATURE: 96.8 F | HEART RATE: 90 BPM

## 2023-01-10 DIAGNOSIS — F33.40 RECURRENT MAJOR DEPRESSIVE DISORDER, IN REMISSION: Primary | ICD-10-CM

## 2023-01-10 PROCEDURE — 99213 OFFICE O/P EST LOW 20 MIN: CPT | Performed by: PHYSICIAN ASSISTANT

## 2023-01-10 RX ORDER — DESVENLAFAXINE SUCCINATE 50 MG/1
50 TABLET, EXTENDED RELEASE ORAL DAILY
Qty: 30 TABLET | Refills: 5 | Status: SHIPPED | OUTPATIENT
Start: 2023-01-10

## 2023-01-10 NOTE — PROGRESS NOTES
Chief Complaint   Patient presents with   • Depression     6 month f/u        HPI     Ronn Lebron is a 32 y.o. male who is here for follow-up of depression.     He reports his mood is good. He has not felt down in any \"holes\" like he was before. He does cite some family stressors. He ran out of Pristiq about 4 days ago. He feels much more even and consistent on the medication. He has mild reduced libido but this not bothersome.     Past Medical History:   Diagnosis Date   • Anxiety        History reviewed. No pertinent surgical history.    History reviewed. No pertinent family history.    Social History     Socioeconomic History   • Marital status: Single   Tobacco Use   • Smoking status: Never   • Smokeless tobacco: Never   Vaping Use   • Vaping Use: Never used   Substance and Sexual Activity   • Alcohol use: Yes     Comment: socially   • Drug use: Yes     Types: Marijuana     Comment: socially   • Sexual activity: Defer       Allergies   Allergen Reactions   • Lexapro [Escitalopram] Other (See Comments)     fatigue   • Wellbutrin [Bupropion] Other (See Comments)     Agitation, ineffective   • Pristiq [Desvenlafaxine] Other (See Comments)     Low libido   • Prozac [Fluoxetine Hcl] Diarrhea       ROS    Review of Systems   Psychiatric/Behavioral: Positive for depressed mood.       Vitals:    01/10/23 1457   BP: 122/78   Pulse: 90   Temp: 96.8 °F (36 °C)   SpO2: 99%     Body mass index is 24.06 kg/m².      Current Outpatient Medications:   •  desvenlafaxine (PRISTIQ) 50 MG 24 hr tablet, Take 1 tablet by mouth Daily., Disp: 30 tablet, Rfl: 5  •  fluticasone (FLONASE) 50 MCG/ACT nasal spray, , Disp: , Rfl:   •  ipratropium (ATROVENT) 0.06 % nasal spray, USE 1-2 SPRAYS EACH NOSTRIL TWICE DAILY AS NEEDED, Disp: , Rfl:   •  Stahist AD 25-60 MG tablet, TAKE 1 TABLET BY MOUTH EVERY 12 HOURS AS NEEDED, Disp: 60 tablet, Rfl: 2  •  butalbital-acetaminophen-caffeine (FIORICET, ESGIC) -40 MG per tablet, Take 1-2  tablets by mouth Every 6 (Six) Hours As Needed for Headache., Disp: 20 tablet, Rfl: 0    PE    Physical Exam  Constitutional:       General: He is not in acute distress.  Pulmonary:      Effort: Pulmonary effort is normal. No respiratory distress.   Neurological:      Mental Status: He is alert.   Psychiatric:         Mood and Affect: Mood normal.         Results    Results for orders placed or performed in visit on 06/28/22   CBC (No Diff)    Specimen: Blood   Result Value Ref Range    WBC 5.75 3.40 - 10.80 10*3/mm3    RBC 5.25 4.14 - 5.80 10*6/mm3    Hemoglobin 15.6 13.0 - 17.7 g/dL    Hematocrit 45.9 37.5 - 51.0 %    MCV 87.4 79.0 - 97.0 fL    MCH 29.7 26.6 - 33.0 pg    MCHC 34.0 31.5 - 35.7 g/dL    RDW 12.1 (L) 12.3 - 15.4 %    RDW-SD 38.6 37.0 - 54.0 fl    MPV 10.1 6.0 - 12.0 fL    Platelets 314 140 - 450 10*3/mm3   Comprehensive Metabolic Panel    Specimen: Blood   Result Value Ref Range    Glucose 90 65 - 99 mg/dL    BUN 20 6 - 20 mg/dL    Creatinine 1.09 0.76 - 1.27 mg/dL    Sodium 137 136 - 145 mmol/L    Potassium 4.8 3.5 - 5.2 mmol/L    Chloride 98 98 - 107 mmol/L    CO2 28.5 22.0 - 29.0 mmol/L    Calcium 10.9 (H) 8.6 - 10.5 mg/dL    Total Protein 7.7 6.0 - 8.5 g/dL    Albumin 5.30 (H) 3.50 - 5.20 g/dL    ALT (SGPT) 18 1 - 41 U/L    AST (SGOT) 26 1 - 40 U/L    Alkaline Phosphatase 53 39 - 117 U/L    Total Bilirubin 0.6 0.0 - 1.2 mg/dL    Globulin 2.4 gm/dL    A/G Ratio 2.2 g/dL    BUN/Creatinine Ratio 18.3 7.0 - 25.0    Anion Gap 10.5 5.0 - 15.0 mmol/L    eGFR 94.2 >60.0 mL/min/1.73   TSH Rfx On Abnormal To Free T4    Specimen: Blood   Result Value Ref Range    TSH 1.610 0.270 - 4.200 uIU/mL       A/P    Problem List Items Addressed This Visit        Mental Health    Recurrent major depressive disorder, in remission (HCC) - Primary  -Doing well overall. Continue Pristiq. Refilled medication.   -Return to clinic in 6 months for an annual physical.    Relevant Medications    desvenlafaxine (PRISTIQ) 50 MG 24  hr tablet       Plan of care was reviewed with patient at the conclusion of today's visit. Education was provided regarding diagnoses, management, and the importance of keeping follow-up appointments. The patient was counseled regarding the risks, benefits, and possible side-effects of treatment. Patient and/or family express understanding and agreement with the management plan.        JAMIE Matos

## 2023-06-01 ENCOUNTER — ANESTHESIA EVENT (OUTPATIENT)
Dept: PERIOP | Facility: HOSPITAL | Age: 33
End: 2023-06-01
Payer: COMMERCIAL

## 2023-06-01 RX ORDER — SODIUM CHLORIDE 9 MG/ML
40 INJECTION, SOLUTION INTRAVENOUS AS NEEDED
Status: CANCELLED | OUTPATIENT
Start: 2023-06-01

## 2023-06-01 RX ORDER — FAMOTIDINE 10 MG/ML
20 INJECTION, SOLUTION INTRAVENOUS ONCE
Status: CANCELLED | OUTPATIENT
Start: 2023-06-01 | End: 2023-06-01

## 2023-06-01 RX ORDER — SODIUM CHLORIDE 0.9 % (FLUSH) 0.9 %
10 SYRINGE (ML) INJECTION EVERY 12 HOURS SCHEDULED
Status: CANCELLED | OUTPATIENT
Start: 2023-06-01

## 2023-06-01 RX ORDER — SODIUM CHLORIDE 0.9 % (FLUSH) 0.9 %
10 SYRINGE (ML) INJECTION AS NEEDED
Status: CANCELLED | OUTPATIENT
Start: 2023-06-01

## 2023-06-02 ENCOUNTER — ANESTHESIA (OUTPATIENT)
Dept: PERIOP | Facility: HOSPITAL | Age: 33
End: 2023-06-02
Payer: COMMERCIAL

## 2023-06-02 ENCOUNTER — HOSPITAL ENCOUNTER (OUTPATIENT)
Facility: HOSPITAL | Age: 33
Setting detail: HOSPITAL OUTPATIENT SURGERY
Discharge: HOME | End: 2023-06-02
Attending: ORTHOPAEDIC SURGERY | Admitting: ORTHOPAEDIC SURGERY
Payer: COMMERCIAL

## 2023-06-02 ENCOUNTER — ANESTHESIA EVENT CONVERTED (OUTPATIENT)
Dept: ANESTHESIOLOGY | Facility: HOSPITAL | Age: 33
End: 2023-06-02
Payer: COMMERCIAL

## 2023-06-02 VITALS
TEMPERATURE: 98 F | BODY MASS INDEX: 24.11 KG/M2 | WEIGHT: 150 LBS | DIASTOLIC BLOOD PRESSURE: 88 MMHG | HEART RATE: 75 BPM | RESPIRATION RATE: 16 BRPM | OXYGEN SATURATION: 99 % | HEIGHT: 66 IN | SYSTOLIC BLOOD PRESSURE: 136 MMHG

## 2023-06-02 PROCEDURE — 25010000002 PROPOFOL 10 MG/ML EMULSION: Performed by: NURSE ANESTHETIST, CERTIFIED REGISTERED

## 2023-06-02 PROCEDURE — 25010000002 HYDROMORPHONE 1 MG/ML SOLUTION

## 2023-06-02 PROCEDURE — 25010000002 DEXAMETHASONE PER 1 MG: Performed by: NURSE ANESTHETIST, CERTIFIED REGISTERED

## 2023-06-02 PROCEDURE — C1713 ANCHOR/SCREW BN/BN,TIS/BN: HCPCS | Performed by: ORTHOPAEDIC SURGERY

## 2023-06-02 PROCEDURE — 25010000002 ONDANSETRON PER 1 MG: Performed by: NURSE ANESTHETIST, CERTIFIED REGISTERED

## 2023-06-02 PROCEDURE — 25010000002 KETOROLAC TROMETHAMINE PER 15 MG: Performed by: NURSE ANESTHETIST, CERTIFIED REGISTERED

## 2023-06-02 PROCEDURE — 25010000002 ROPIVACAINE PER 1 MG: Performed by: NURSE ANESTHETIST, CERTIFIED REGISTERED

## 2023-06-02 PROCEDURE — 25010000002 CEFAZOLIN IN DEXTROSE 2-4 GM/100ML-% SOLUTION: Performed by: ORTHOPAEDIC SURGERY

## 2023-06-02 PROCEDURE — 25010000002 MIDAZOLAM PER 1 MG: Performed by: ANESTHESIOLOGY

## 2023-06-02 PROCEDURE — 25010000002 EPINEPHRINE PER 0.1 MG: Performed by: ORTHOPAEDIC SURGERY

## 2023-06-02 PROCEDURE — 25010000002 FENTANYL CITRATE (PF) 100 MCG/2ML SOLUTION: Performed by: NURSE ANESTHETIST, CERTIFIED REGISTERED

## 2023-06-02 PROCEDURE — 25010000002 FENTANYL CITRATE (PF) 50 MCG/ML SOLUTION

## 2023-06-02 DEVICE — ALL-INSIDE MENISCAL REPAIR SYSTEM, CURVED DOWN
Type: IMPLANTABLE DEVICE | Site: KNEE | Status: FUNCTIONAL
Brand: AIR+

## 2023-06-02 DEVICE — ALL-INSIDE MENISCAL REPAIR SYSTEM, CURVED UP
Type: IMPLANTABLE DEVICE | Site: KNEE | Status: FUNCTIONAL
Brand: AIR+

## 2023-06-02 RX ORDER — KETOROLAC TROMETHAMINE 30 MG/ML
INJECTION, SOLUTION INTRAMUSCULAR; INTRAVENOUS AS NEEDED
Status: DISCONTINUED | OUTPATIENT
Start: 2023-06-02 | End: 2023-06-02 | Stop reason: SURG

## 2023-06-02 RX ORDER — KETAMINE HCL IN NACL, ISO-OSM 100MG/10ML
SYRINGE (ML) INJECTION AS NEEDED
Status: DISCONTINUED | OUTPATIENT
Start: 2023-06-02 | End: 2023-06-02 | Stop reason: SURG

## 2023-06-02 RX ORDER — MEPERIDINE HYDROCHLORIDE 25 MG/ML
12.5 INJECTION INTRAMUSCULAR; INTRAVENOUS; SUBCUTANEOUS
Status: DISCONTINUED | OUTPATIENT
Start: 2023-06-02 | End: 2023-06-02 | Stop reason: HOSPADM

## 2023-06-02 RX ORDER — LIDOCAINE HYDROCHLORIDE 10 MG/ML
0.5 INJECTION, SOLUTION EPIDURAL; INFILTRATION; INTRACAUDAL; PERINEURAL ONCE AS NEEDED
Status: COMPLETED | OUTPATIENT
Start: 2023-06-02 | End: 2023-06-02

## 2023-06-02 RX ORDER — PROPOFOL 10 MG/ML
VIAL (ML) INTRAVENOUS AS NEEDED
Status: DISCONTINUED | OUTPATIENT
Start: 2023-06-02 | End: 2023-06-02 | Stop reason: SURG

## 2023-06-02 RX ORDER — DEXMEDETOMIDINE HYDROCHLORIDE 100 UG/ML
INJECTION, SOLUTION INTRAVENOUS AS NEEDED
Status: DISCONTINUED | OUTPATIENT
Start: 2023-06-02 | End: 2023-06-02 | Stop reason: SURG

## 2023-06-02 RX ORDER — DROPERIDOL 2.5 MG/ML
0.62 INJECTION, SOLUTION INTRAMUSCULAR; INTRAVENOUS ONCE AS NEEDED
Status: DISCONTINUED | OUTPATIENT
Start: 2023-06-02 | End: 2023-06-02 | Stop reason: HOSPADM

## 2023-06-02 RX ORDER — BUPIVACAINE HYDROCHLORIDE 2.5 MG/ML
INJECTION, SOLUTION EPIDURAL; INFILTRATION; INTRACAUDAL
Status: COMPLETED | OUTPATIENT
Start: 2023-06-02 | End: 2023-06-02

## 2023-06-02 RX ORDER — LIDOCAINE HYDROCHLORIDE 10 MG/ML
INJECTION, SOLUTION EPIDURAL; INFILTRATION; INTRACAUDAL; PERINEURAL AS NEEDED
Status: DISCONTINUED | OUTPATIENT
Start: 2023-06-02 | End: 2023-06-02 | Stop reason: SURG

## 2023-06-02 RX ORDER — DEXAMETHASONE SODIUM PHOSPHATE 4 MG/ML
INJECTION, SOLUTION INTRA-ARTICULAR; INTRALESIONAL; INTRAMUSCULAR; INTRAVENOUS; SOFT TISSUE AS NEEDED
Status: DISCONTINUED | OUTPATIENT
Start: 2023-06-02 | End: 2023-06-02 | Stop reason: SURG

## 2023-06-02 RX ORDER — FAMOTIDINE 20 MG/1
20 TABLET, FILM COATED ORAL ONCE
Status: COMPLETED | OUTPATIENT
Start: 2023-06-02 | End: 2023-06-02

## 2023-06-02 RX ORDER — FENTANYL CITRATE 50 UG/ML
50 INJECTION, SOLUTION INTRAMUSCULAR; INTRAVENOUS
Status: DISCONTINUED | OUTPATIENT
Start: 2023-06-02 | End: 2023-06-02 | Stop reason: SDUPTHER

## 2023-06-02 RX ORDER — EPINEPHRINE 1 MG/ML
INJECTION, SOLUTION, CONCENTRATE INTRAVENOUS AS NEEDED
Status: DISCONTINUED | OUTPATIENT
Start: 2023-06-02 | End: 2023-06-02 | Stop reason: HOSPADM

## 2023-06-02 RX ORDER — CEFAZOLIN SODIUM 2 G/100ML
2 INJECTION, SOLUTION INTRAVENOUS ONCE
Status: COMPLETED | OUTPATIENT
Start: 2023-06-02 | End: 2023-06-02

## 2023-06-02 RX ORDER — ROPIVACAINE HYDROCHLORIDE 2 MG/ML
INJECTION, SOLUTION EPIDURAL; INFILTRATION; PERINEURAL CONTINUOUS
Status: DISCONTINUED | OUTPATIENT
Start: 2023-06-02 | End: 2023-06-02 | Stop reason: HOSPADM

## 2023-06-02 RX ORDER — FENTANYL CITRATE 50 UG/ML
INJECTION, SOLUTION INTRAMUSCULAR; INTRAVENOUS
Status: COMPLETED
Start: 2023-06-02 | End: 2023-06-02

## 2023-06-02 RX ORDER — MAGNESIUM HYDROXIDE 1200 MG/15ML
LIQUID ORAL AS NEEDED
Status: DISCONTINUED | OUTPATIENT
Start: 2023-06-02 | End: 2023-06-02 | Stop reason: HOSPADM

## 2023-06-02 RX ORDER — SODIUM CHLORIDE, SODIUM LACTATE, POTASSIUM CHLORIDE, CALCIUM CHLORIDE 600; 310; 30; 20 MG/100ML; MG/100ML; MG/100ML; MG/100ML
9 INJECTION, SOLUTION INTRAVENOUS CONTINUOUS
Status: DISCONTINUED | OUTPATIENT
Start: 2023-06-02 | End: 2023-06-02 | Stop reason: HOSPADM

## 2023-06-02 RX ORDER — ONDANSETRON 2 MG/ML
INJECTION INTRAMUSCULAR; INTRAVENOUS AS NEEDED
Status: DISCONTINUED | OUTPATIENT
Start: 2023-06-02 | End: 2023-06-02 | Stop reason: SURG

## 2023-06-02 RX ORDER — IBUPROFEN 400 MG/1
400 TABLET ORAL EVERY 6 HOURS PRN
COMMUNITY

## 2023-06-02 RX ORDER — ALBUTEROL SULFATE 2.5 MG/3ML
2.5 SOLUTION RESPIRATORY (INHALATION) ONCE AS NEEDED
Status: DISCONTINUED | OUTPATIENT
Start: 2023-06-02 | End: 2023-06-02 | Stop reason: HOSPADM

## 2023-06-02 RX ORDER — ONDANSETRON 2 MG/ML
4 INJECTION INTRAMUSCULAR; INTRAVENOUS ONCE AS NEEDED
Status: DISCONTINUED | OUTPATIENT
Start: 2023-06-02 | End: 2023-06-02 | Stop reason: HOSPADM

## 2023-06-02 RX ORDER — FENTANYL CITRATE 50 UG/ML
50 INJECTION, SOLUTION INTRAMUSCULAR; INTRAVENOUS
Status: DISCONTINUED | OUTPATIENT
Start: 2023-06-02 | End: 2023-06-02 | Stop reason: HOSPADM

## 2023-06-02 RX ORDER — MIDAZOLAM HYDROCHLORIDE 1 MG/ML
1 INJECTION INTRAMUSCULAR; INTRAVENOUS
Status: DISCONTINUED | OUTPATIENT
Start: 2023-06-02 | End: 2023-06-02 | Stop reason: HOSPADM

## 2023-06-02 RX ORDER — FENTANYL CITRATE 50 UG/ML
INJECTION, SOLUTION INTRAMUSCULAR; INTRAVENOUS AS NEEDED
Status: DISCONTINUED | OUTPATIENT
Start: 2023-06-02 | End: 2023-06-02 | Stop reason: SURG

## 2023-06-02 RX ADMIN — DEXMEDETOMIDINE HYDROCHLORIDE 4 MCG: 100 INJECTION, SOLUTION INTRAVENOUS at 16:06

## 2023-06-02 RX ADMIN — DEXMEDETOMIDINE HYDROCHLORIDE 4 MCG: 100 INJECTION, SOLUTION INTRAVENOUS at 15:45

## 2023-06-02 RX ADMIN — KETOROLAC TROMETHAMINE 30 MG: 30 INJECTION, SOLUTION INTRAMUSCULAR; INTRAVENOUS at 16:11

## 2023-06-02 RX ADMIN — DEXAMETHASONE SODIUM PHOSPHATE 8 MG: 4 INJECTION, SOLUTION INTRAMUSCULAR; INTRAVENOUS at 15:08

## 2023-06-02 RX ADMIN — DEXMEDETOMIDINE HYDROCHLORIDE 4 MCG: 100 INJECTION, SOLUTION INTRAVENOUS at 15:40

## 2023-06-02 RX ADMIN — HYDROMORPHONE HYDROCHLORIDE 0.5 MG: 1 INJECTION, SOLUTION INTRAMUSCULAR; INTRAVENOUS; SUBCUTANEOUS at 18:06

## 2023-06-02 RX ADMIN — LIDOCAINE HYDROCHLORIDE 100 MG: 10 INJECTION, SOLUTION EPIDURAL; INFILTRATION; INTRACAUDAL; PERINEURAL at 14:42

## 2023-06-02 RX ADMIN — SODIUM CHLORIDE, POTASSIUM CHLORIDE, SODIUM LACTATE AND CALCIUM CHLORIDE 9 ML/HR: 600; 310; 30; 20 INJECTION, SOLUTION INTRAVENOUS at 13:40

## 2023-06-02 RX ADMIN — DEXMEDETOMIDINE HYDROCHLORIDE 4 MCG: 100 INJECTION, SOLUTION INTRAVENOUS at 15:55

## 2023-06-02 RX ADMIN — Medication 1000 MG: at 17:15

## 2023-06-02 RX ADMIN — SODIUM CHLORIDE, POTASSIUM CHLORIDE, SODIUM LACTATE AND CALCIUM CHLORIDE: 600; 310; 30; 20 INJECTION, SOLUTION INTRAVENOUS at 16:20

## 2023-06-02 RX ADMIN — DEXMEDETOMIDINE HYDROCHLORIDE 4 MCG: 100 INJECTION, SOLUTION INTRAVENOUS at 15:24

## 2023-06-02 RX ADMIN — BUPIVACAINE HYDROCHLORIDE 20 ML: 2.5 INJECTION, SOLUTION EPIDURAL; INFILTRATION; INTRACAUDAL at 16:34

## 2023-06-02 RX ADMIN — FAMOTIDINE 20 MG: 20 TABLET ORAL at 13:57

## 2023-06-02 RX ADMIN — LIDOCAINE HYDROCHLORIDE 0.2 ML: 10 INJECTION, SOLUTION EPIDURAL; INFILTRATION; INTRACAUDAL; PERINEURAL at 13:40

## 2023-06-02 RX ADMIN — Medication 0.5 MG: at 18:06

## 2023-06-02 RX ADMIN — FENTANYL CITRATE 50 MCG: 50 INJECTION, SOLUTION INTRAMUSCULAR; INTRAVENOUS at 17:25

## 2023-06-02 RX ADMIN — DEXMEDETOMIDINE HYDROCHLORIDE 4 MCG: 100 INJECTION, SOLUTION INTRAVENOUS at 15:28

## 2023-06-02 RX ADMIN — FENTANYL CITRATE 100 MCG: 50 INJECTION, SOLUTION INTRAMUSCULAR; INTRAVENOUS at 15:06

## 2023-06-02 RX ADMIN — FENTANYL CITRATE 50 MCG: 50 INJECTION, SOLUTION INTRAMUSCULAR; INTRAVENOUS at 17:17

## 2023-06-02 RX ADMIN — PROPOFOL 200 MG: 10 INJECTION, EMULSION INTRAVENOUS at 14:43

## 2023-06-02 RX ADMIN — MIDAZOLAM HYDROCHLORIDE 2 MG: 1 INJECTION, SOLUTION INTRAMUSCULAR; INTRAVENOUS at 14:36

## 2023-06-02 RX ADMIN — Medication 50 MG: at 14:44

## 2023-06-02 RX ADMIN — CEFAZOLIN SODIUM 2 G: 2 INJECTION, SOLUTION INTRAVENOUS at 14:36

## 2023-06-02 RX ADMIN — DEXMEDETOMIDINE HYDROCHLORIDE 4 MCG: 100 INJECTION, SOLUTION INTRAVENOUS at 16:02

## 2023-06-02 RX ADMIN — ONDANSETRON 4 MG: 2 INJECTION INTRAMUSCULAR; INTRAVENOUS at 15:10

## 2023-06-02 RX ADMIN — DEXMEDETOMIDINE HYDROCHLORIDE 4 MCG: 100 INJECTION, SOLUTION INTRAVENOUS at 15:32

## 2023-06-02 NOTE — ANESTHESIA PREPROCEDURE EVALUATION
Anesthesia Evaluation     Patient summary reviewed and Nursing notes reviewed   no history of anesthetic complications:  NPO Solid Status: > 8 hours  NPO Liquid Status: > 2 hours           Airway   Mallampati: I  TM distance: >3 FB  Neck ROM: full  No difficulty expected  Dental - normal exam     Pulmonary - normal exam   Cardiovascular - normal exam  Exercise tolerance: excellent (>7 METS)        Neuro/Psych  (+) psychiatric history Anxiety and Depression,    GI/Hepatic/Renal/Endo      Musculoskeletal     Abdominal  - normal exam    Abdomen: soft.   Substance History      OB/GYN          Other                        Anesthesia Plan    ASA 2     general     intravenous induction     Anesthetic plan, risks, benefits, and alternatives have been provided, discussed and informed consent has been obtained with: patient.    Plan discussed with CRNA.        CODE STATUS:

## 2023-06-02 NOTE — DISCHARGE INSTRUCTIONS
InfuBLOCK - Patient Information    What is a pain pump?  InfuBLOCK is a postoperative, non-narcotic pain relief system that delivers local anesthetic to or near the surgical site. This is a pain minimizing therapy that delivers an anesthetic (numbing) medicine to the nerve.    The InfuBLOCK pain pump will continuously deliver a local anesthetic medication to block the pain in the area of your procedure.    Where can I find information about my pain pump?           For more information about your pain pump, scan the QR code.  For additional patient resources, visit [facility]/resources-[email or web address].                                                                                             The [employer] Nursing Hotline is Here for You 24/7.     Call [phone] for Assistance.  While your physician is your primary source for information about your treatment., there may be times during your treatment that you need assistance with your infusion pump. Our team of compassionate and knowledgeable Registered Nursed (RN) is here to assist every step of the way.    Answers to questions about your infusion pump                 Tubing disconnect  Assistance with pump alarms                                                      Dislodged catheter  Excessive leakage noted from pump                                         Inadequate pain control   Nerve Catheter Removal Instructions  When your device is empty:    Remove your catheter by pulling the dressing off slowly (like you would remove a regular bandage). The catheter should pull right out of the skin.  Check that the BLUE tip is intact.                                                                                     If the catheter is stuck, reposition your   extremity and pull slowly until removed.  *If catheter is HURTING and WON'T come out, stop and call [phone] for further assistance.    Remove medication bag from the black carrying case.  Cut the  tubing on right and left side of pump, and discard the medication bag and tubing into garbage.  Place the pump and black carrying case into the plastic bag and then place this into the return box.  Seal box with blue stickers and return to [employer] postal service.    THIS IS PRE-PAID POSTAGE.

## 2023-06-02 NOTE — ANESTHESIA POSTPROCEDURE EVALUATION
Patient: Ronn Lebron    Procedure Summary     Date: 06/02/23 Room / Location:  [facility]  [facility]  [facility]    Anesthesia Start: 1438 Anesthesia Stop: 1648    Procedure: KNEE ARTHROSCOPY WITH MEDIAL MENISCUS REPAIR (BUCKET HANDLE) right (Right: Knee) Diagnosis:     Surgeons: Maurisio Bhatia MD Provider: Michael Woodson MD    Anesthesia Type: general ASA Status: 2          Anesthesia Type: general    Vitals  Vitals Value Taken Time   BP     Temp     Pulse 66 06/02/23 1647   Resp     SpO2 96 % 06/02/23 1647   Vitals shown include unvalidated device data.        Post Anesthesia Care and Evaluation    Patient location during evaluation: PACU  Patient participation: complete - patient participated  Level of consciousness: awake  Pain score: 0  Pain management: adequate    Airway patency: patent  Anesthetic complications: No anesthetic complications  PONV Status: none  Cardiovascular status: acceptable and stable  Respiratory status: nasal cannula, unassisted, acceptable and spontaneous ventilation  Hydration status: acceptable

## 2023-06-02 NOTE — OP NOTE
KNEE ARTHROSCOPY WITH MENISCAL REPAIR  Procedure Report    Patient Name:  Ronn Lebron  YOB: 1990    Date of Surgery:  6/2/2023     Indications: 32-year-old with displaced medial meniscus bucket-handle tear.  Treatment options were discussed at length including operative versus nonoperative treatment.  Risks and benefits of surgery were discussed including but not limited to bleeding, infection, injury to surrounding structures such as blood vessels tendons and nerves. We discussed the possibility of surgical procedure failure, nonhealing or retear of the meniscus, persistent pain, loss of motion, persistent stiffness, persistent weakness, and the need for a revision surgery. In addition, we discussed the risk of heart attack, stroke, or death.    Pre-op Diagnosis:      Right knee medial meniscus bucket-handle tear    Post-op Diagnosis:       Right knee medial meniscus bucket-handle tear    Procedure/CPT® Codes:      Procedure(s):  KNEE ARTHROSCOPY WITH MEDIAL MENISCUS REPAIR (BUCKET HANDLE) right    Staff:  Surgeon(s):  Maurisio Bhatia MD    Circulator: Vincent Vora RN; Samira Heath RN; Susana Campuzano RN  Scrub Person: Aman Brodyesa  Vendor Representative: Devonte Koch Sophie           Anesthesia: General    Estimated Blood Loss: minimal    Implants:    Implant Name Type Inv. Item Serial No.  Lot No. LRB No. Used Action   SYS REPR MENISC AIRPLS ALL/INSIDE CRV/UP - [id number] Implant SYS REPR MENISC AIRPLS ALL/INSIDE CRV/UP  LELIA CASSI [id number] Right 1 Implanted   SYS REPR MENISC AIRPLS ALL/INSIDE CRV/DWN - [id number] Implant SYS REPR MENISC AIRPLS ALL/INSIDE CRV/DWN  LELIA CASSI [id number] Right 1 Implanted       Specimen:                None        Complications: None apparent    Description of Procedure:     Patient was identified in the preoperative holding area and the right knee was marked.  Patient was transported to the OR and place supine on the  operative table.  General anesthesia was induced.  Examination of the knee showed no ligamentous instability.  The right knee was prepped and draped In the usual sterile fashion.  Preoperative time out was performed indicating correct patient, correct side, correct procedure, and that preoperative antibiotic had been given.  Next an Esmarch was used to exsanguinate the extremity and the tourniquet was inflated to 300mmHg.  Standard anterior lateral and anteromedial portals were created.  The scope was placed in the anterolateral portal.  Diagnostic exam of the medial compartment showed no evidence of cartilage injury.    The medial meniscus was displaced and flipped into the notch with a large bucket-handle tear extending from the posterior horn adjacent to the meniscal root all the way into the anterior horn.  The tearing was vertical through the red red and red-white junction throughout the area of the tear.  The tissue quality itself appeared to be pretty good other than some mild degenerative fraying at the area of the tear.  Given this I felt this was deafly one that should be repaired given its size and the patient's young age.  Therefore the tear was prepared using a rasp to stimulate bleeding from the meniscal capsular tissue.  Next for the anterior aspect a outside in approach is used for the most anterior suture.  A meniscal tape was utilized for this.  This is followed by inside-out approach for the mid body extending into the mid body posterior horn junction.  A total of 4 additional meniscal tape sutures were thrown in this mechanism alternating between the inferior and superior surface in a vertical mattress pattern.  For the posterior horn I did elect to proceed with a all inside technique and similarly in a vertical mattress pattern 2 additional anchors were started on the superior and inferior surface reducing the tear. Once complete the tear was sitting nicely in the appropriate anatomic position.   The meniscal tissue was probed and found to be securely fixed.  Next the scope was moved to the notch.  Within the notch the ACL and PCL were noted to be intact.  In the lateral compartment there was noted to be no evidence of tearing of the lateral meniscus.  There is no evidence of cartilage injury or wear and tear within the lateral compartment.  The scope was then driven into the patellofemoral joint where there was noted to be no cartilage changes on the undersurface the patella.  No changes of the trochlear groove.  There were no loose bodies noted in the suprapatellar pouch or medial or lateral gutters.  Next I returned back to the notch where the soft tissue off the most distal aspect of the lateral femoral condyle within the notch was debrided to the level of bone.  I then used a microfracture awl to make several small microfractures to stimulate a bleeding response.  Fluid was turned off and suction was used which did show a good blush of blood out of each of these holes.  Once this was complete the knee was drained of excess fluid and the scope was removed from the knee.  The leg was then washed and dressed.  Patient was placed into a postoperative hinged knee for protection of the repair and stability.  Patient was then awoken and taken to the recovery room in stable condition.    Disposition:   Patient will be partial weightbearing 25% to the right lower extremity with crutches full-time.  he will start physical therapy as soon as possible on the peripheral meniscal repair protocol.  I will plan on seeing the patient back in 10-14 days for first postoperative check and removal of sutures at that point.      Maurisio Bhatia MD     Date: 6/2/2023  Time: 16:29 EDT

## 2023-06-02 NOTE — ANESTHESIA PROCEDURE NOTES
Airway  Urgency: elective    Date/Time: 6/2/2023 2:58 PM  Airway not difficult    General Information and Staff    Patient location during procedure: OR    Indications and Patient Condition  Indications for airway management: airway protection    Preoxygenated: yes  Mask difficulty assessment: 1 - vent by mask    Final Airway Details  Final airway type: supraglottic airway      Successful airway: I-gel  Size 4     Number of attempts at approach: 1  Assessment: lips, teeth, and gum same as pre-op    Additional Comments  LMA placed without difficulty, ventilation with assist, equal breath sounds and symmetric chest rise and fall

## 2023-06-02 NOTE — ANESTHESIA PROCEDURE NOTES
Peripheral Block      Patient reassessed immediately prior to procedure    Reason for block: at surgeon's request and post-op pain management  Performed by  Anesthesiologist: Michael Woodson MD  Preanesthetic Checklist  Completed: patient identified, IV checked, site marked, risks and benefits discussed, surgical consent, monitors and equipment checked, pre-op evaluation and timeout performed  Prep:  Pt Position: supine  Sterile barriers:cap, gloves, mask, sterile barriers and washed/disinfected hands  Prep: ChloraPrep  Patient monitoring: blood pressure monitoring, continuous pulse oximetry and EKG  Procedure  Performed under: spinal  Guidance:ultrasound guided    ULTRASOUND INTERPRETATION.  Using ultrasound guidance a 20 G gauge needle was placed in close proximity to the nerve, at which point, under ultrasound guidance anesthetic was injected in the area of the nerve and spread of the anesthesia was seen on ultrasound in close proximity thereto.  There were no abnormalities seen on ultrasound; a digital image was taken; and the patient tolerated the procedure with no complications. Images:still images obtained, printed/placed on chart    Laterality:right  Block Type:adductor canal block  Injection Technique:catheter  Needle Type:Tuohy and echogenic  Needle Gauge:18 G  Resistance on Injection: none  Catheter Size:20 G (20g)  Cath Depth at skin: 9 cm    Medications Used: bupivacaine PF (MARCAINE) 0.25 % injection - Injection   20 mL - 6/2/2023 4:34:00 PM      Post Assessment  Injection Assessment: negative aspiration for heme, incremental injection and no paresthesia on injection  Patient Tolerance:comfortable throughout block  Complications:no  Additional Notes  CATHETER   A high-frequency linear transducer, with sterile cover, was placed on the anterior mid-thigh (between the anterior superior iliac spine and patella). The transducer was then moved medially to identify the Sartorius muscle (Josie), Vastus  "Medialis muscle (VMM), Superficial Femoral Artery (SFA) and Vein. The transducer was then moved cephalad or caudad to position the SFA in the middle of the Josie. The insertion site was prepped and draped in sterile fashion. Skin and cutaneous tissue was infiltrated with 2-5 ml of 1% Lidocaine. Using ultrasound-guidance, an 18-gauge Contiplex Ultra 360 Touhy needle was advanced in plane from lateral to medial. Preservative-free normal saline was utilized for hydro-dissection of tissue, advancement of Touhy, and to confirm needle placement below the fascial plane of the Josie where the Nerve to the VMM is located. Local anesthetic (LA) 5 ml deposited here. The Touhy needle continues its path lateral to the SFA at the level of the Saphenous Nerve. The remainder of the LA was deposited at the 10-11 o'clock position of the SFA. This injection created a space between the Josie and the SFA. Aspiration every 5 ml to prevent intravascular injection. Injection was completed with negative aspiration of blood and negative intravascular injection. Injection pressures were normal with minimal resistance. A 20-gauge NakedRoomiplex Echo catheter was placed through the needle and advance out the tip of the Touhy 3-5 cm anterior to the SFA. The Touhy needle was then removed, and final catheter position verified at the 12 o'clock position to the SFA. The catheter was secured in the usual fashion with skin glue, benzoin, steri-strips, CHG tegaderm and label noting \"Nerve Block Catheter\". Jerk tape applied at yellow connector and catheter connection.           "

## 2023-06-02 NOTE — INTERVAL H&P NOTE
"[facility] Pre-op    Full history and physical note from office is attached.    VS: /102  HR 92  RR 16  T 97.0  Sat 100%RA  Ht 166.4 cm (65.5\")   Wt 68 kg (150 lb)   BMI 24.58 kg/m²       LAB Results:  Lab Results   Component Value Date    WBC 5.75 06/28/2022    HGB 15.6 06/28/2022    HCT 45.9 06/28/2022    MCV 87.4 06/28/2022     06/28/2022    NEUTROABS 2.58 05/07/2021    GLUCOSE 90 06/28/2022    BUN 20 06/28/2022    CREATININE 1.09 06/28/2022    EGFRIFNONA 102 05/07/2021     06/28/2022    K 4.8 06/28/2022    CL 98 06/28/2022    CO2 28.5 06/28/2022    CALCIUM 10.9 (H) 06/28/2022    ALBUMIN 5.30 (H) 06/28/2022    AST 26 06/28/2022    ALT 18 06/28/2022    BILITOT 0.6 06/28/2022       Cancer Staging (if applicable)  Cancer Patient: __ yes __no __unknown__N/A; If yes, clinical stage T:__ N:__M:__, stage group or __N/A      Impression: right knee medial meniscus tear       Plan: KNEE ARTHROSCOPY WITH MEDIAL MENISCUS REPAIR (BUCKET HANDLE) VS. PARTIAL MEDIAL MENISCECTOMY - LEFT      Agueda Jade, HENRY   6/2/2023   13:47 EDT   "

## 2024-02-05 ENCOUNTER — OFFICE VISIT (OUTPATIENT)
Age: 34
End: 2024-02-05
Payer: COMMERCIAL

## 2024-02-05 DIAGNOSIS — F41.9 ANXIETY DISORDER, UNSPECIFIED TYPE: Primary | ICD-10-CM

## 2024-02-05 PROCEDURE — 90834 PSYTX W PT 45 MINUTES: CPT
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
## 2024-02-06 ENCOUNTER — OFFICE VISIT (OUTPATIENT)
Age: 34
End: 2024-02-06
Payer: COMMERCIAL

## 2024-02-06 VITALS
HEIGHT: 66 IN | BODY MASS INDEX: 23.9 KG/M2 | DIASTOLIC BLOOD PRESSURE: 78 MMHG | HEART RATE: 82 BPM | WEIGHT: 148.7 LBS | OXYGEN SATURATION: 98 % | SYSTOLIC BLOOD PRESSURE: 118 MMHG

## 2024-02-06 DIAGNOSIS — F33.1 MAJOR DEPRESSIVE DISORDER, RECURRENT EPISODE, MODERATE DEGREE: Primary | ICD-10-CM

## 2024-02-06 DIAGNOSIS — Z51.81 ENCOUNTER FOR THERAPEUTIC DRUG LEVEL MONITORING: ICD-10-CM

## 2024-02-06 PROCEDURE — 1159F MED LIST DOCD IN RCRD: CPT | Performed by: STUDENT IN AN ORGANIZED HEALTH CARE EDUCATION/TRAINING PROGRAM

## 2024-02-06 PROCEDURE — 90792 PSYCH DIAG EVAL W/MED SRVCS: CPT | Performed by: STUDENT IN AN ORGANIZED HEALTH CARE EDUCATION/TRAINING PROGRAM

## 2024-02-06 PROCEDURE — 1160F RVW MEDS BY RX/DR IN RCRD: CPT | Performed by: STUDENT IN AN ORGANIZED HEALTH CARE EDUCATION/TRAINING PROGRAM

## 2024-02-06 RX ORDER — ALBUTEROL SULFATE 90 UG/1
1 AEROSOL, METERED RESPIRATORY (INHALATION)
COMMUNITY
Start: 2023-03-07

## 2024-02-06 RX ORDER — DESVENLAFAXINE SUCCINATE 50 MG/1
50 TABLET, EXTENDED RELEASE ORAL DAILY
Qty: 30 TABLET | Refills: 1 | Status: SHIPPED | OUTPATIENT
Start: 2024-02-06
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
## 2024-02-10 LAB
1OH-MIDAZOLAM UR QL SCN: NOT DETECTED NG/MG CREAT
6MAM UR QL SCN: NEGATIVE NG/ML
7AMINOCLONAZEPAM/CREAT UR: NOT DETECTED NG/MG CREAT
A-OH ALPRAZ/CREAT UR: NOT DETECTED NG/MG CREAT
A-OH-TRIAZOLAM/CREAT UR CFM: NOT DETECTED NG/MG CREAT
ACP UR QL CFM: NOT DETECTED
ALPRAZ/CREAT UR CFM: NOT DETECTED NG/MG CREAT
AMPHETAMINES UR QL SCN: NEGATIVE NG/ML
APAP UR QL SCN: NORMAL UG/ML
APAP UR QL: NORMAL
APAP UR-MCNC: PRESENT UG/ML
BARBITURATES UR QL SCN: NEGATIVE NG/ML
BENZODIAZ SCN METH UR: NORMAL
BUPRENORPHINE UR QL SCN: NEGATIVE
BUPRENORPHINE/CREAT UR: NOT DETECTED NG/MG CREAT
CANNABINOIDS UR QL CFM: NORMAL
CANNABINOIDS UR QL SCN: NORMAL NG/ML
CARBOXYTHC UR CFM-MCNC: 361 NG/MG CREAT
CARISOPRODOL UR QL: NEGATIVE NG/ML
CLONAZEPAM/CREAT UR CFM: NOT DETECTED NG/MG CREAT
COCAINE+BZE UR QL SCN: NEGATIVE NG/ML
CREAT UR-MCNC: 93 MG/DL
D-METHORPHAN UR-MCNC: NOT DETECTED NG/ML
D-METHORPHAN+LEVORPHANOL UR QL: NOT DETECTED
DESALKYLFLURAZ/CREAT UR: NOT DETECTED NG/MG CREAT
DIAZEPAM/CREAT UR: NOT DETECTED NG/MG CREAT
ETG ETS UR QL CFM: NORMAL
ETHANOL UR QL SCN: NEGATIVE G/DL
ETHANOL UR QL SCN: NORMAL NG/ML
ETHYL GLUCURONIDE UR CFM-MCNC: 1592 NG/MG CREAT
ETHYL SULFATE UR CFM-MCNC: NOT DETECTED NG/MG CREAT
FENTANYL CTO UR SCN-MCNC: NEGATIVE NG/ML
FENTANYL/CREAT UR: NOT DETECTED NG/MG CREAT
FLUNITRAZEPAM UR QL SCN: NOT DETECTED NG/MG CREAT
GABAPENTIN UR-MCNC: NEGATIVE UG/ML
HYPNOTICS UR QL SCN: NEGATIVE
KETAMINE UR QL: NOT DETECTED
LORAZEPAM/CREAT UR: NOT DETECTED NG/MG CREAT
MEPERIDINE UR QL SCN: NEGATIVE NG/ML
METHADONE UR QL SCN: NEGATIVE NG/ML
METHADONE+METAB UR QL SCN: NEGATIVE NG/ML
MIDAZOLAM/CREAT UR CFM: NOT DETECTED NG/MG CREAT
MISCELLANEOUS, UR: NEGATIVE
NORBUPRENORPHINE/CREAT UR: NOT DETECTED NG/MG CREAT
NORDIAZEPAM/CREAT UR: NOT DETECTED NG/MG CREAT
NORFENTANYL/CREAT UR: NOT DETECTED NG/MG CREAT
NORFLUNITRAZEPAM UR-MCNC: NOT DETECTED NG/MG CREAT
NORKETAMINE UR-MCNC: NOT DETECTED UG/ML
OPIATES UR SCN-MCNC: NEGATIVE NG/ML
OTHER HALLUCINOGENS UR: NEGATIVE
OXAZEPAM/CREAT UR: 91 NG/MG CREAT
OXYCODONE CTO UR SCN-MCNC: NEGATIVE NG/ML
PCP UR QL SCN: NEGATIVE NG/ML
PRESCRIBED MEDICATIONS: NORMAL
PROPOXYPH UR QL SCN: NEGATIVE NG/ML
TAPENTADOL CTO UR SCN-MCNC: NEGATIVE NG/ML
TEMAZEPAM/CREAT UR: NOT DETECTED NG/MG CREAT
TRAMADOL UR QL SCN: NEGATIVE NG/ML
ZALEPLON UR-MCNC: NOT DETECTED NG/ML
ZOLPIDEM PHENYL-4-CARB UR QL SCN: NOT DETECTED
ZOLPIDEM UR QL SCN: NOT DETECTED
ZOPICLONE-N-OXIDE UR-MCNC: NOT DETECTED NG/ML

## 2024-02-12 ENCOUNTER — OFFICE VISIT (OUTPATIENT)
Age: 34
End: 2024-02-12
Payer: COMMERCIAL

## 2024-02-12 DIAGNOSIS — F32.A DEPRESSION, UNSPECIFIED DEPRESSION TYPE: Primary | ICD-10-CM

## 2024-02-12 DIAGNOSIS — F41.9 ANXIETY DISORDER, UNSPECIFIED TYPE: ICD-10-CM

## 2024-02-12 PROCEDURE — 90834 PSYTX W PT 45 MINUTES: CPT

## 2024-03-06 ENCOUNTER — OFFICE VISIT (OUTPATIENT)
Age: 34
End: 2024-03-06
Payer: COMMERCIAL

## 2024-03-06 VITALS
WEIGHT: 150.3 LBS | SYSTOLIC BLOOD PRESSURE: 118 MMHG | HEART RATE: 79 BPM | DIASTOLIC BLOOD PRESSURE: 74 MMHG | BODY MASS INDEX: 24.15 KG/M2 | HEIGHT: 66 IN | OXYGEN SATURATION: 98 %

## 2024-03-06 DIAGNOSIS — F33.1 MAJOR DEPRESSIVE DISORDER, RECURRENT EPISODE, MODERATE DEGREE: Primary | ICD-10-CM

## 2024-03-06 RX ORDER — DESVENLAFAXINE SUCCINATE 50 MG/1
50 TABLET, EXTENDED RELEASE ORAL DAILY
Qty: 90 TABLET | Refills: 1 | Status: SHIPPED | OUTPATIENT
Start: 2024-03-06

## 2024-03-06 NOTE — PROGRESS NOTES
"      [facility]             Follow Up Office Visit      Date: 2024   Patient Name: Ronn Lebron  : 1990   MRN: [id number]     Referring Provider: Pj Samuels PA-C    Chief Complaint:      ICD-10-CM ICD-9-CM   1. Major depressive disorder, recurrent episode, moderate degree  F33.1 296.32      History of Present Illness:   Ronn Lebron is a 33 y.o. male who is here today for follow up with MDD.    Patient is seen and evaluated in the office.  He appears to be in no acute distress at this time.  He is calm and cooperative with the evaluation, and exhibits a linear and goal-directed thought process.  Patient states that the medicine is helping him significantly.  His anxiety is decreased, and he feels happier day-to-day than he did before.  Before the medication, patient states that he would get into funks where he would dwell on things and experiencing increased anxiety.  Now, he states that he is able to prevent himself from this.  He feels as though the medication has helped prevent him from feeling like he is in a fight or flight situation.  Prior, he would have moments where he would \" just sit and think about life and my heart rate will go crazy.  I can stop that now.\"  Patient is having knee surgery a week from Monday.  He states that he is looking forward to having this done because he will have less limitations, and less pain.  He has been sleeping well at night; denies nightmares.  Appetite is fair.  He has noticed a decrease in libido.  There are a few days where if you were planning to have intercourse at night he would take half of his prescribed dose of medication.  He states that this helped him significantly.  He did not notice any changes in mood symptoms.  Writer suggested Wellbutrin, which may help with this.  Patient has taken Wellbutrin in the past though and states that he feels as though this medication made him extremely anxious.  " "Patient does not want to switch Pristiq to another medication as he has tried multiple medications in the past and they did not work for him.  Writer suggested talk with her primary care regarding decreased libido as there may be something that they can do to help with this as well.  Patient is agreeable to this.  Due to his stability, we will follow-up in 3 months.    Previous Medication Trials:  Pristiq : \"I've tried a bunch and they did not work for me\"     Subjective      Review of Systems:   Review of Systems   Constitutional:  Negative for chills, fatigue and fever.   HENT:  Negative for congestion, hearing loss, sore throat and trouble swallowing.    Eyes:  Negative for blurred vision and double vision.   Respiratory:  Negative for cough, chest tightness and shortness of breath.    Cardiovascular:  Negative for chest pain and palpitations.   Gastrointestinal:  Negative for abdominal pain, constipation, diarrhea, nausea and vomiting.   Endocrine: Negative for polydipsia and polyuria.   Genitourinary:  Negative for hematuria and urgency.   Musculoskeletal:  Negative for arthralgias.   Skin:  Negative for skin lesions and bruise.   Neurological:  Negative for dizziness, tremors, seizures and light-headedness.   Hematological:  Negative for adenopathy.     Screening Scores:   PHQ-9 : 9  DASHA-7 : 7    Medications:     Current Outpatient Medications:     albuterol sulfate  (90 Base) MCG/ACT inhaler, Inhale 1 puff 4 (Four) Times a Day., Disp: , Rfl:     desvenlafaxine (Pristiq) 50 MG 24 hr tablet, Take 1 tablet by mouth Daily., Disp: 90 tablet, Rfl: 1    fluticasone (FLONASE) 50 MCG/ACT nasal spray, , Disp: , Rfl:     Pseudoephedrine-guaiFENesin (MUCINEX D PO), Take  by mouth., Disp: , Rfl:     Medication Considerations:  AVELINA reviewed and appropriate.     Allergies:   Allergies   Allergen Reactions    Lexapro [Escitalopram] Other (See Comments)     fatigue    Wellbutrin [Bupropion] Other (See Comments)     " "Agitation, ineffective    Prozac [Fluoxetine Hcl] Diarrhea     Objective     Vital Signs:   Vitals:    03/06/24 1050   BP: 118/74   Pulse: 79   SpO2: 98%   Weight: 68.2 kg (150 lb 4.8 oz)   Height: 166.4 cm (65.51\")     Body mass index is 24.62 kg/m².     Mental Status Exam:   MENTAL STATUS EXAM   General Appearance:  Cleanly groomed and dressed  Eye Contact:  Good eye contact  Attitude:  Cooperative  Motor Activity:  Normal gait, posture  Muscle Strength:  Normal  Speech:  Normal rate, tone, volume  Language:  Spontaneous  Mood and affect:  Normal, pleasant and appropriate  Hopelessness:  Denies  Thought Process:  Logical and goal-directed  Associations/ Thought Content:  No delusions  Hallucinations:  None  Suicidal Ideations:  Not present  Homicidal Ideation:  Not present  Sensorium:  Alert  Orientation:  Person, place, time and situation  Immediate Recall, Recent, and Remote Memory:  Intact  Attention Span/ Concentration:  Good  Fund of Knowledge:  Appropriate for age and educational level  Intellectual Functioning:  Average range  Insight:  Fair  Judgement:  Fair  Reliability:  Fair  Impulse Control:  Fair      SUICIDE RISK ASSESSMENT/CSSRS:  1. Does patient have thoughts of suicide? denies  2. Does patient have intent for suicide? denies  3. Does patient have a current plan for suicide? denies  4. History of suicide attempts: denies  5. Family history of suicide or attempts: denies  6. History of violent behaviors towards others or property or thoughts of committing suicide: denies  7. History of sexual aggression toward others: denies  8. Access to firearms or weapons: denies    Assessment / Plan      Visit Diagnosis/Orders Placed This Visit:  Diagnoses and all orders for this visit:    1. Major depressive disorder, recurrent episode, moderate degree (Primary)  - Continue Pristiq 50 mg po daily  - Continue therapy in this clinic  - Will need updated labs; plans to return to PCP  - Follow up with writer 3 " mo  Chart Reviewed      Functional Status: Mild impairment      Prognosis: Fair with Ongoing Treatment    Impression/Formulation:  Patient appeared alert and oriented.  Patient is voluntarily requesting to begin outpatient therapy at [facility].  Patient is receptive to assistance with maintaining a stable lifestyle.  Patient presents with history of     ICD-10-CM ICD-9-CM   1. Major depressive disorder, recurrent episode, moderate degree  F33.1 296.32     Treatment Plan:     Patient will continue supportive psychotherapy efforts and medications as indicated. Clinic will obtain release of information for current treatment team for continuity of care as needed. Patient will contact this office, call 911 or present to the nearest emergency room should suicidal or homicidal ideations occur.  Discussed medication options and treatment plan of prescribed medication(s) as well as the risks, benefits, and potential side effects. Patient ackowledged and verbally consented to continue with current treatment plan and was educated on the importance of compliance with treatment and follow-up appointments.     Quality Measures:  Tobacco: Ronn Lebron  reports that he has never smoked. He has never used smokeless tobacco.. I have educated him on the risk of diseases from using tobacco products such as cancer, COPD, and heart disease.     Follow Up:   Return in about 3 months (around 6/6/2024) for Medication Management.    Roxanne Harmon MD  [facility] [address]     This is electronically signed by Roxanne Harmon MD   03/06/2024 10:57 EST

## 2024-03-18 ENCOUNTER — ANESTHESIA (OUTPATIENT)
Dept: PERIOP | Facility: HOSPITAL | Age: 34
End: 2024-03-18
Payer: COMMERCIAL

## 2024-03-18 ENCOUNTER — ANESTHESIA EVENT (OUTPATIENT)
Dept: PERIOP | Facility: HOSPITAL | Age: 34
End: 2024-03-18
Payer: COMMERCIAL

## 2024-03-18 ENCOUNTER — HOSPITAL ENCOUNTER (OUTPATIENT)
Facility: HOSPITAL | Age: 34
Setting detail: HOSPITAL OUTPATIENT SURGERY
Discharge: HOME | End: 2024-03-18
Attending: ORTHOPAEDIC SURGERY | Admitting: ORTHOPAEDIC SURGERY
Payer: COMMERCIAL

## 2024-03-18 VITALS
HEIGHT: 66 IN | SYSTOLIC BLOOD PRESSURE: 123 MMHG | HEART RATE: 58 BPM | DIASTOLIC BLOOD PRESSURE: 81 MMHG | BODY MASS INDEX: 23.3 KG/M2 | RESPIRATION RATE: 16 BRPM | TEMPERATURE: 97.5 F | OXYGEN SATURATION: 98 % | WEIGHT: 145 LBS

## 2024-03-18 PROCEDURE — 25010000002 CEFAZOLIN PER 500 MG: Performed by: ORTHOPAEDIC SURGERY

## 2024-03-18 PROCEDURE — 25810000003 LACTATED RINGERS PER 1000 ML: Performed by: ANESTHESIOLOGY

## 2024-03-18 PROCEDURE — 25010000002 PROPOFOL 10 MG/ML EMULSION: Performed by: ANESTHESIOLOGY

## 2024-03-18 PROCEDURE — 25010000002 FENTANYL CITRATE (PF) 50 MCG/ML SOLUTION

## 2024-03-18 PROCEDURE — C1713 ANCHOR/SCREW BN/BN,TIS/BN: HCPCS | Performed by: ORTHOPAEDIC SURGERY

## 2024-03-18 PROCEDURE — 25010000002 DEXAMETHASONE PER 1 MG: Performed by: ANESTHESIOLOGY

## 2024-03-18 PROCEDURE — 25010000002 HYDROMORPHONE 1 MG/ML SOLUTION

## 2024-03-18 PROCEDURE — 25010000002 FENTANYL CITRATE (PF) 100 MCG/2ML SOLUTION: Performed by: ANESTHESIOLOGY

## 2024-03-18 PROCEDURE — 25010000002 EPINEPHRINE PER 0.1 MG: Performed by: ORTHOPAEDIC SURGERY

## 2024-03-18 PROCEDURE — 25010000002 ONDANSETRON PER 1 MG: Performed by: ANESTHESIOLOGY

## 2024-03-18 DEVICE — ALL-INSIDE MENISCAL REPAIR SYSTEM, CURVED UP
Type: IMPLANTABLE DEVICE | Site: KNEE | Status: FUNCTIONAL
Brand: AIR+

## 2024-03-18 RX ORDER — LIDOCAINE HYDROCHLORIDE 10 MG/ML
INJECTION, SOLUTION EPIDURAL; INFILTRATION; INTRACAUDAL; PERINEURAL AS NEEDED
Status: DISCONTINUED | OUTPATIENT
Start: 2024-03-18 | End: 2024-03-18 | Stop reason: SURG

## 2024-03-18 RX ORDER — IPRATROPIUM BROMIDE AND ALBUTEROL SULFATE 2.5; .5 MG/3ML; MG/3ML
3 SOLUTION RESPIRATORY (INHALATION) ONCE AS NEEDED
Status: DISCONTINUED | OUTPATIENT
Start: 2024-03-18 | End: 2024-03-18 | Stop reason: HOSPADM

## 2024-03-18 RX ORDER — ONDANSETRON 2 MG/ML
INJECTION INTRAMUSCULAR; INTRAVENOUS AS NEEDED
Status: DISCONTINUED | OUTPATIENT
Start: 2024-03-18 | End: 2024-03-18 | Stop reason: SURG

## 2024-03-18 RX ORDER — LIDOCAINE HYDROCHLORIDE 10 MG/ML
0.5 INJECTION, SOLUTION EPIDURAL; INFILTRATION; INTRACAUDAL; PERINEURAL ONCE AS NEEDED
Status: COMPLETED | OUTPATIENT
Start: 2024-03-18 | End: 2024-03-18

## 2024-03-18 RX ORDER — SODIUM CHLORIDE, SODIUM LACTATE, POTASSIUM CHLORIDE, CALCIUM CHLORIDE 600; 310; 30; 20 MG/100ML; MG/100ML; MG/100ML; MG/100ML
9 INJECTION, SOLUTION INTRAVENOUS CONTINUOUS
Status: DISCONTINUED | OUTPATIENT
Start: 2024-03-18 | End: 2024-03-18 | Stop reason: HOSPADM

## 2024-03-18 RX ORDER — OXYCODONE HYDROCHLORIDE 5 MG/1
5 TABLET ORAL ONCE
Status: COMPLETED | OUTPATIENT
Start: 2024-03-18 | End: 2024-03-18

## 2024-03-18 RX ORDER — SODIUM CHLORIDE 0.9 % (FLUSH) 0.9 %
10 SYRINGE (ML) INJECTION AS NEEDED
Status: CANCELLED | OUTPATIENT
Start: 2024-03-18

## 2024-03-18 RX ORDER — ONDANSETRON 2 MG/ML
4 INJECTION INTRAMUSCULAR; INTRAVENOUS ONCE AS NEEDED
Status: DISCONTINUED | OUTPATIENT
Start: 2024-03-18 | End: 2024-03-18 | Stop reason: HOSPADM

## 2024-03-18 RX ORDER — PROPOFOL 10 MG/ML
VIAL (ML) INTRAVENOUS AS NEEDED
Status: DISCONTINUED | OUTPATIENT
Start: 2024-03-18 | End: 2024-03-18 | Stop reason: SURG

## 2024-03-18 RX ORDER — OXYCODONE HYDROCHLORIDE 5 MG/1
TABLET ORAL
Status: COMPLETED
Start: 2024-03-18 | End: 2024-03-18

## 2024-03-18 RX ORDER — FENTANYL CITRATE 50 UG/ML
50 INJECTION, SOLUTION INTRAMUSCULAR; INTRAVENOUS
Status: DISCONTINUED | OUTPATIENT
Start: 2024-03-18 | End: 2024-03-18 | Stop reason: HOSPADM

## 2024-03-18 RX ORDER — FENTANYL CITRATE 50 UG/ML
INJECTION, SOLUTION INTRAMUSCULAR; INTRAVENOUS AS NEEDED
Status: DISCONTINUED | OUTPATIENT
Start: 2024-03-18 | End: 2024-03-18 | Stop reason: SURG

## 2024-03-18 RX ORDER — DEXMEDETOMIDINE HYDROCHLORIDE 4 UG/ML
INJECTION, SOLUTION INTRAVENOUS AS NEEDED
Status: DISCONTINUED | OUTPATIENT
Start: 2024-03-18 | End: 2024-03-18 | Stop reason: SURG

## 2024-03-18 RX ORDER — FENTANYL CITRATE 50 UG/ML
INJECTION, SOLUTION INTRAMUSCULAR; INTRAVENOUS
Status: COMPLETED
Start: 2024-03-18 | End: 2024-03-18

## 2024-03-18 RX ORDER — MAGNESIUM HYDROXIDE 1200 MG/15ML
LIQUID ORAL AS NEEDED
Status: DISCONTINUED | OUTPATIENT
Start: 2024-03-18 | End: 2024-03-18 | Stop reason: HOSPADM

## 2024-03-18 RX ORDER — DEXMEDETOMIDINE HYDROCHLORIDE 100 UG/ML
INJECTION, SOLUTION INTRAVENOUS AS NEEDED
Status: DISCONTINUED | OUTPATIENT
Start: 2024-03-18 | End: 2024-03-18 | Stop reason: SURG

## 2024-03-18 RX ORDER — SODIUM CHLORIDE 0.9 % (FLUSH) 0.9 %
10 SYRINGE (ML) INJECTION EVERY 12 HOURS SCHEDULED
Status: DISCONTINUED | OUTPATIENT
Start: 2024-03-18 | End: 2024-03-18 | Stop reason: HOSPADM

## 2024-03-18 RX ORDER — FAMOTIDINE 20 MG/1
20 TABLET, FILM COATED ORAL ONCE
Status: COMPLETED | OUTPATIENT
Start: 2024-03-18 | End: 2024-03-18

## 2024-03-18 RX ORDER — FAMOTIDINE 10 MG/ML
20 INJECTION, SOLUTION INTRAVENOUS ONCE
Status: CANCELLED | OUTPATIENT
Start: 2024-03-18 | End: 2024-03-18

## 2024-03-18 RX ORDER — MIDAZOLAM HYDROCHLORIDE 1 MG/ML
1 INJECTION INTRAMUSCULAR; INTRAVENOUS
Status: DISCONTINUED | OUTPATIENT
Start: 2024-03-18 | End: 2024-03-18 | Stop reason: HOSPADM

## 2024-03-18 RX ORDER — SODIUM CHLORIDE 9 MG/ML
40 INJECTION, SOLUTION INTRAVENOUS AS NEEDED
Status: CANCELLED | OUTPATIENT
Start: 2024-03-18

## 2024-03-18 RX ORDER — DEXAMETHASONE SODIUM PHOSPHATE 4 MG/ML
INJECTION, SOLUTION INTRA-ARTICULAR; INTRALESIONAL; INTRAMUSCULAR; INTRAVENOUS; SOFT TISSUE AS NEEDED
Status: DISCONTINUED | OUTPATIENT
Start: 2024-03-18 | End: 2024-03-18 | Stop reason: SURG

## 2024-03-18 RX ORDER — HYDROMORPHONE HYDROCHLORIDE 1 MG/ML
0.5 INJECTION, SOLUTION INTRAMUSCULAR; INTRAVENOUS; SUBCUTANEOUS
Status: DISCONTINUED | OUTPATIENT
Start: 2024-03-18 | End: 2024-03-18 | Stop reason: HOSPADM

## 2024-03-18 RX ADMIN — FENTANYL CITRATE 100 MCG: 50 INJECTION, SOLUTION INTRAMUSCULAR; INTRAVENOUS at 15:37

## 2024-03-18 RX ADMIN — HYDROMORPHONE HYDROCHLORIDE 0.5 MG: 1 INJECTION, SOLUTION INTRAMUSCULAR; INTRAVENOUS; SUBCUTANEOUS at 16:13

## 2024-03-18 RX ADMIN — HYDROMORPHONE HYDROCHLORIDE 0.5 MG: 1 INJECTION, SOLUTION INTRAMUSCULAR; INTRAVENOUS; SUBCUTANEOUS at 16:20

## 2024-03-18 RX ADMIN — DEXAMETHASONE SODIUM PHOSPHATE 4 MG: 4 INJECTION INTRA-ARTICULAR; INTRALESIONAL; INTRAMUSCULAR; INTRAVENOUS; SOFT TISSUE at 14:33

## 2024-03-18 RX ADMIN — OXYCODONE 5 MG: 5 TABLET ORAL at 16:35

## 2024-03-18 RX ADMIN — DEXMEDETOMIDINE HYDROCHLORIDE IN 0.9% SODIUM CHLORIDE 12 MCG: 4 INJECTION INTRAVENOUS at 14:29

## 2024-03-18 RX ADMIN — PROPOFOL 20 MG: 10 INJECTION, EMULSION INTRAVENOUS at 15:25

## 2024-03-18 RX ADMIN — SODIUM CHLORIDE 2000 MG: 900 INJECTION INTRAVENOUS at 14:35

## 2024-03-18 RX ADMIN — DEXMEDETOMIDINE HYDROCHLORIDE IN 0.9% SODIUM CHLORIDE 4 MCG: 4 INJECTION INTRAVENOUS at 15:19

## 2024-03-18 RX ADMIN — DEXMEDETOMIDINE HYDROCHLORIDE 4 MCG: 100 INJECTION, SOLUTION INTRAVENOUS at 15:30

## 2024-03-18 RX ADMIN — FAMOTIDINE 20 MG: 20 TABLET, FILM COATED ORAL at 12:07

## 2024-03-18 RX ADMIN — PROPOFOL 20 MG: 10 INJECTION, EMULSION INTRAVENOUS at 15:30

## 2024-03-18 RX ADMIN — FENTANYL CITRATE 100 MCG: 50 INJECTION, SOLUTION INTRAMUSCULAR; INTRAVENOUS at 14:29

## 2024-03-18 RX ADMIN — DEXMEDETOMIDINE HYDROCHLORIDE IN 0.9% SODIUM CHLORIDE 4 MCG: 4 INJECTION INTRAVENOUS at 15:00

## 2024-03-18 RX ADMIN — FENTANYL CITRATE 50 MCG: 50 INJECTION, SOLUTION INTRAMUSCULAR; INTRAVENOUS at 16:57

## 2024-03-18 RX ADMIN — SODIUM CHLORIDE, POTASSIUM CHLORIDE, SODIUM LACTATE AND CALCIUM CHLORIDE 9 ML/HR: 600; 310; 30; 20 INJECTION, SOLUTION INTRAVENOUS at 12:07

## 2024-03-18 RX ADMIN — ONDANSETRON 4 MG: 2 INJECTION INTRAMUSCULAR; INTRAVENOUS at 15:30

## 2024-03-18 RX ADMIN — OXYCODONE HYDROCHLORIDE 5 MG: 5 TABLET ORAL at 16:35

## 2024-03-18 RX ADMIN — PROPOFOL 250 MG: 10 INJECTION, EMULSION INTRAVENOUS at 14:29

## 2024-03-18 RX ADMIN — LIDOCAINE HYDROCHLORIDE 50 MG: 10 INJECTION, SOLUTION EPIDURAL; INFILTRATION; INTRACAUDAL; PERINEURAL at 14:29

## 2024-03-18 RX ADMIN — LIDOCAINE HYDROCHLORIDE 0.5 ML: 10 INJECTION, SOLUTION EPIDURAL; INFILTRATION; INTRACAUDAL; PERINEURAL at 12:07

## 2024-03-18 RX ADMIN — DEXMEDETOMIDINE HYDROCHLORIDE 12 MCG: 100 INJECTION, SOLUTION INTRAVENOUS at 14:34

## 2024-03-18 NOTE — OP NOTE
KNEE ARTHROSCOPY WITH MENISCAL REPAIR  Procedure Report    Patient Name:  Ronn Lebron  YOB: 1990    Date of Surgery:  3/18/2024     Indications: 33-year-old male with history of previous right knee medial meniscus bucket-handle repair presented with persistent pain on the medial side of his knee.  Imaging shows new tear pattern with a more radial type tear.  Treatment options were discussed including operative versus nonoperative treatment.  Risks and benefits of surgery were discussed including but not limited to bleeding, infection, injury to surrounding structures such as blood vessels tendons and nerves. We discussed the possibility of surgical procedure failure, nonhealing or retear of the meniscus, persistent pain, loss of motion, persistent stiffness, persistent weakness, and the need for a revision surgery. In addition, we discussed the risk of heart attack, stroke, or death.  Patient understands these as well as alternative forms of treatment and does like to proceed    Pre-op Diagnosis:      Right knee medial meniscus tear    Post-op Diagnosis:       Right knee medial meniscus tear    Procedure/CPT® Codes:      Procedure(s):  KNEE ARTHROSCOPY WITH MEDIAL MENISCUS REPAIR- RIGHT    Staff:  Surgeon(s):  Maurisio Bhatia MD    Circulator: Priya Kennedy RN; Isaac Rowe RN; Eliseo Zimmer RN  Scrub Person: Magi Lira  Vendor Representative: Sebastien Koch  Assistant: Gianfranco Alvarez PA-C     Assistant: Gianfranco Alvarez PA-C  Indication for surgical assistant:  Surgical assistant was indicated for assistance with patient positioning as well as critical portions of the procedure including holding the camera, placement of implants/hardware, and closure of wounds.  Anesthesia: General    Estimated Blood Loss: minimal    Implants:    Implant Name Type Inv. Item Serial No.  Lot No. LRB No. Used Action   SYS REPR MENISC AIRPLS ALL/INSIDE CRV/UP - [id number]  Implant SYS REPR MENISC AIRPLS ALL/INSIDE CRV/UP  LELIA CASSI [id number] Right 1 Implanted   SYS REPR MENISC AIRPLS ALL/INSIDE CRV/UP - [id number] Implant SYS REPR MENISC AIRPLS ALL/INSIDE CRV/UP  LELIA CASSI [id number] Right 1 Implanted   SYS REPR MENISC AIRPLS ALL/INSIDE CRV/UP - [id number] Implant SYS REPR MENISC AIRPLS ALL/INSIDE CRV/UP  LELIA CASSI [id number] Right 1 Implanted   SYS REPR MENISC AIRPLS ALL/INSIDE CRV/UP - [id number] Implant SYS REPR MENISC AIRPLS ALL/INSIDE CRV/UP  LELIA CASSI [id number] Right 1 Implanted       Specimen:                None        Complications: None apparent    Description of Procedure:     Patient was identified in the preoperative holding area and the right knee was marked.  Patient was transported to the OR and place supine on the operative table.  General anesthesia was induced.  Examination of the knee showed no ligamentous instability.  The right knee was prepped and draped In the usual sterile fashion.  Preoperative time out was performed indicating correct patient, correct side, correct procedure, and that preoperative antibiotic had been given.  Next an Esmarch was used to exsanguinate the extremity and the tourniquet was inflated to 250mmHg.  Standard anterior lateral and anteromedial portals were created.  The scope was placed in the anterolateral portal.  Diagnostic exam of the medial compartment showed no evidence of cartilage injury.    The medial meniscus was carefully evaluated.  The area of previous bucket-handle tearing appeared to be well-healed both at the mid body as well as the posterior horn.  At the junction between the mid body and posterior horn there was a nearly full-thickness radial tear.  Unclear exactly the etiology of this this did not appear to be in the area of the previously placed suture or anchor.   With probing this was noted to be fairly mobile.  The tissue quality was slightly diminished and degenerative in appearance but still did appear to have  some degree of substance to it.  I consider my options.  Based on discussions with the patient preoperatively he really wanted to can give this every chance possible to try to heal and not to remove the cushion in the knee.  I think given his age and his activity level this is really the best thing for him as well therefore I did decide to proceed with a an attempt at repair.  therefore the tear was prepared using a rasp to stimulate bleeding from the meniscal capsular tissue.  Next fiber stitch meniscal repair anchors were used to reduce the torn portio The tear was prepared using a combination of meniscal biter and shaver to debride to healthier rim of tissue.  Felt that the best technique would be for the use of ripstop sutures and then a bridging suture across this.  I began by placing 2 vertically oriented ripstop sutures on the anterior and posterior sides of the tear.  A third all inside anchor was then used on either side of the ripstop's to reduce the tear.  I did attempt to get a fourth anchor in but there really was not enough room in real estate and this did not end up getting a good enough bite and pulled out although did not disrupt the repair that had with the previous construct.  Based on this I felt this was as good as I was able to get was able to get up majority of the tissue back together and reduced and give this a chance to heal. The meniscal tissue was probed and found to be securely fixed.  Next the scope was moved to the notch.  Within the notch the ACL and PCL were noted to be intact.  In the lateral compartment there was noted to be no evidence of tearing of the lateral meniscus.  There is no evidence of cartilage injury or wear and tear within the lateral compartment.  The scope was then driven into the patellofemoral joint where there was noted to be no evidence of cartilage injury.  No changes of the trochlear groove.  There were no loose bodies noted in the suprapatellar pouch or  medial or lateral gutters.    Next I returned back to the notch where the soft tissue off the most distal aspect of the lateral femoral condyle within the notch was debrided to the level of bone.  I then used a microfracture awl to make several small microfractures to stimulate a bleeding response.  Fluid was turned off and suction was used which did show a good blush of blood out of each of these holes.  Once this was complete the knee was drained of excess fluid and the scope was removed from the knee.  The leg was then washed and dressed.  Patient was placed into a postoperative hinged knee for protection of the repair and stability.  Patient was then awoken and taken to the recovery room in stable condition.    Disposition:   Patient will be nonweightbearing to the right lower extremity with crutches full-time.  he will start physical therapy as soon as possible.  I will plan on seeing the patient back in 10-14 days for first postoperative check and removal of sutures at that point.  Physical therapy protocol will be meniscal root/radial meniscal tear protocol.      Maurisio Bhatia MD     Date: 3/18/2024  Time: 16:05 EDT

## 2024-03-18 NOTE — ANESTHESIA PROCEDURE NOTES
Airway  Urgency: elective    Date/Time: 3/18/2024 2:30 PM  Airway not difficult    General Information and Staff    Patient location during procedure: OR  Anesthesiologist: Anatoly Burgos MD  CRNA/CAA: Rk Medrano CRNA  SRNA: Ulises Dumont SRNA  Indications and Patient Condition  Indications for airway management: airway protection    Preoxygenated: yes  Mask difficulty assessment: 1 - vent by mask    Final Airway Details  Final airway type: supraglottic airway      Successful airway: classic  Size 4     Number of attempts at approach: 1  Assessment: lips, teeth, and gum same as pre-op    Additional Comments  LMA placed without difficulty, ventilation with assist, equal breath sounds and symmetric chest rise and fall

## 2024-03-18 NOTE — H&P
Pre-Op H&P  Ronn Macedo [place]  [id number]  1990      Chief complaint: Right knee pain      Subjective:  Patient is a 33 y.o.male presents for scheduled surgery by Dr. Bhatia. He anticipates a KNEE ARTHROSCOPY WITH MEDIAL MENISCUS REPAIR VS. PARTIAL MEDIAL MENISCECTOMY - RIGHT  today. He injured his knee about a year ago doing [employer]. He underwent right knee arthroscopy with medial meniscus repair on 6/2/23. He has continued to have pain.       Review of Systems:  Constitutional-- No fever, chills or sweats. No fatigue.  CV-- No chest pain, palpitation or syncope  Resp-- No SOB, cough, hemoptysis  Skin--No rashes or lesions      Allergies:   Allergies   Allergen Reactions    Lexapro [Escitalopram] Other (See Comments)     fatigue    Wellbutrin [Bupropion] Other (See Comments)     Agitation, ineffective    Prozac [Fluoxetine Hcl] Diarrhea         Home Meds:  Medications Prior to Admission   Medication Sig Dispense Refill Last Dose    desvenlafaxine (Pristiq) 50 MG 24 hr tablet Take 1 tablet by mouth Daily. 90 tablet 1 3/17/2024    fluticasone (FLONASE) 50 MCG/ACT nasal spray 2 sprays into the nostril(s) as directed by provider Daily.   3/17/2024    Pseudoephedrine-guaiFENesin (MUCINEX D PO) Take 1 dose by mouth As Needed (allergies).   3/17/2024    albuterol sulfate  (90 Base) MCG/ACT inhaler Inhale 1 puff 4 (Four) Times a Day.   More than a month    aspirin (Adult Aspirin Regimen) 81 MG EC tablet Take 1 tablet by mouth Daily. 28 tablet 0     ondansetron (ZOFRAN) 4 MG tablet Take 1 tablet by mouth Every 6 (Six) Hours As Needed for Nausea. 30 tablet 0     oxyCODONE (ROXICODONE) 5 MG immediate release tablet Take 1-2 tablets by mouth Every 4 (Four) to 6 (Six) Hours As Needed for Post-Op Pain. 36 tablet 0          PMH:   Past Medical History:   Diagnosis Date    Anxiety     Asthma     Wears eyeglasses      PSH:    Past Surgical History:   Procedure Laterality Date    KNEE MENISCAL REPAIR Right  "6/2/2023    Procedure: KNEE ARTHROSCOPY WITH MEDIAL MENISCUS REPAIR (BUCKET HANDLE) RIGHT;  Surgeon: Maurisio Bhatia MD;  Location: [facility];  Service: Orthopedics;  Laterality: Right;    WISDOM TOOTH EXTRACTION         Immunization History:  Influenza: No  Pneumococcal: No  Tetanus: UTD  Covid : x1    Social History:   Tobacco:   Social History     Tobacco Use   Smoking Status Never    Passive exposure: Never   Smokeless Tobacco Never      Alcohol:     Social History     Substance and Sexual Activity   Alcohol Use Yes    Alcohol/week: 4.0 standard drinks of alcohol    Types: 4 Cans of beer per week    Comment: socially         Physical Exam:/81 (BP Location: Right arm, Patient Position: Lying)   Pulse 77   Temp 97.6 °F (36.4 °C) (Temporal)   Resp 15   Ht 166.4 cm (65.5\")   Wt 65.8 kg (145 lb)   SpO2 97%   BMI 23.76 kg/m²       General Appearance:    Alert, cooperative, no distress, appears stated age   Head:    Normocephalic, without obvious abnormality, atraumatic   Lungs:     Clear to auscultation bilaterally, respirations unlabored    Heart:   Regular rate and rhythm, S1 and S2 normal    Abdomen:    Soft without tenderness   Extremities:   Extremities normal, atraumatic, no cyanosis or edema   Skin:   Skin color, texture, turgor normal, no rashes or lesions   Neurologic:   Grossly intact     Results Review:     LABS:  Lab Results   Component Value Date    WBC 5.75 06/28/2022    HGB 15.6 06/28/2022    HCT 45.9 06/28/2022    MCV 87.4 06/28/2022     06/28/2022    NEUTROABS 2.58 05/07/2021    GLUCOSE 90 06/28/2022    BUN 20 06/28/2022    CREATININE 1.09 06/28/2022    EGFRIFNONA 102 05/07/2021     06/28/2022    K 4.8 06/28/2022    CL 98 06/28/2022    CO2 28.5 06/28/2022    CALCIUM 10.9 (H) 06/28/2022    ALBUMIN 5.30 (H) 06/28/2022    AST 26 06/28/2022    ALT 18 06/28/2022    BILITOT 0.6 06/28/2022       RADIOLOGY:  Imaging Results (Last 72 Hours)       ** No results found for the last 72 " hours. **            I reviewed the patient's new clinical results.    Cancer Staging (if applicable)  Cancer Patient: __ yes __no __unknown; If yes, clinical stage T:__ N:__M:__, stage group or __N/A      Impression: Right knee pain      Plan: KNEE ARTHROSCOPY WITH MEDIAL MENISCUS REPAIR VS. PARTIAL MEDIAL MENISCECTOMY - RIGHT       Agueda Jade, HENRY   3/18/2024   12:04 EDT

## 2024-03-18 NOTE — ANESTHESIA PREPROCEDURE EVALUATION
Anesthesia Evaluation     Patient summary reviewed and Nursing notes reviewed   no history of anesthetic complications:   NPO Solid Status: > 8 hours  NPO Liquid Status: > 8 hours           Airway   Mallampati: II  TM distance: >3 FB  Neck ROM: full  No difficulty expected  Dental      Pulmonary - normal exam   (+) asthma,  Cardiovascular - normal exam        Neuro/Psych  (+) psychiatric history  GI/Hepatic/Renal/Endo      Musculoskeletal     Abdominal    Substance History      OB/GYN          Other                    Anesthesia Plan    ASA 2     general     intravenous induction     Anesthetic plan, risks, benefits, and alternatives have been provided, discussed and informed consent has been obtained with: patient.    Plan discussed with CRNA.    CODE STATUS:

## 2024-03-18 NOTE — ANESTHESIA POSTPROCEDURE EVALUATION
Patient: Ronn Lebron    Procedure Summary       Date: 03/18/24 [facility] / [facility]    Anesthesia Start: 1427 Anesthesia Stop: 1549    Procedure: KNEE ARTHROSCOPY WITH MEDIAL MENISCUS REPAIR- RIGHT (Right: Knee) Diagnosis:     Surgeons: Maurisio Bhatia MD Provider: Anatoly Burgos MD    Anesthesia Type: general ASA Status: 2            Anesthesia Type: general    Vitals  Vitals Value Taken Time   /58 03/18/24 1549   Temp 98 °F (36.7 °C) 03/18/24 1549   Pulse 65 03/18/24 1549   Resp 16 03/18/24 1549   SpO2 98 % 03/18/24 1549           Post Anesthesia Care and Evaluation    Patient location during evaluation: PACU  Patient participation: complete - patient participated  Level of consciousness: sleepy but conscious  Pain management: adequate    Airway patency: patent  Anesthetic complications: No anesthetic complications  PONV Status: none  Cardiovascular status: hemodynamically stable and acceptable  Respiratory status: nonlabored ventilation, acceptable, nasal cannula and oral airway  Hydration status: acceptable

## 2024-10-25 ENCOUNTER — TELEPHONE (OUTPATIENT)
Age: 34
End: 2024-10-25
Payer: COMMERCIAL

## 2024-10-25 RX ORDER — DESVENLAFAXINE 50 MG/1
50 TABLET, FILM COATED, EXTENDED RELEASE ORAL DAILY
Qty: 90 TABLET | Refills: 1 | Status: SHIPPED | OUTPATIENT
Start: 2024-10-25

## 2024-10-25 NOTE — TELEPHONE ENCOUNTER
Ronn called and is requesting a refill on Desvenlafaxine to be sent to [facility] pharmacy at [address] [facility].  Follow-up appointment scheduled on 11/26/24.  Please advise.

## 2025-03-18 ENCOUNTER — OFFICE VISIT (OUTPATIENT)
Dept: FAMILY MEDICINE CLINIC | Facility: CLINIC | Age: 35
End: 2025-03-18
Payer: COMMERCIAL

## 2025-03-18 VITALS
DIASTOLIC BLOOD PRESSURE: 68 MMHG | HEIGHT: 66 IN | WEIGHT: 152 LBS | OXYGEN SATURATION: 98 % | HEART RATE: 86 BPM | BODY MASS INDEX: 24.43 KG/M2 | SYSTOLIC BLOOD PRESSURE: 128 MMHG

## 2025-03-18 DIAGNOSIS — F41.9 ANXIETY: ICD-10-CM

## 2025-03-18 DIAGNOSIS — F33.40 RECURRENT MAJOR DEPRESSIVE DISORDER, IN REMISSION: Primary | ICD-10-CM

## 2025-03-18 PROCEDURE — 99214 OFFICE O/P EST MOD 30 MIN: CPT | Performed by: PHYSICIAN ASSISTANT

## 2025-03-18 PROCEDURE — 1126F AMNT PAIN NOTED NONE PRSNT: CPT | Performed by: PHYSICIAN ASSISTANT

## 2025-03-18 RX ORDER — ALBUTEROL SULFATE 90 UG/1
2 INHALANT RESPIRATORY (INHALATION) EVERY 4 HOURS PRN
Qty: 18 G | Refills: 0 | Status: SHIPPED | OUTPATIENT
Start: 2025-03-18

## 2025-03-18 RX ORDER — BUSPIRONE HYDROCHLORIDE 5 MG/1
5 TABLET ORAL 2 TIMES DAILY
Qty: 60 TABLET | Refills: 2 | Status: SHIPPED | OUTPATIENT
Start: 2025-03-18

## 2025-03-18 NOTE — PROGRESS NOTES
"    Chief Complaint   Patient presents with    PTSD     Discuss ptsd (official diagnosis and medications)   Referral to allergist   Refill on inhaler        HPI     Ronn Lebron is a pleasant 34 y.o. male with a history of anxiety and depression who presents for evaluation of \"chief complaint.\"     The patient was last seen over 2 years ago.  He reports he has been seeing a therapist online who believes he may have PTSD. Has not been able to go on family vacations for 3 years. If he is around them, he is flooded with emotions. He did see [facility] - Dr. Harmon but this was one year ago. He reports compliance on Pristiq. Has constant unease because family wants him to come to events.     Past Medical History:   Diagnosis Date    Anxiety     Asthma     Wears eyeglasses        Past Surgical History:   Procedure Laterality Date    KNEE MENISCAL REPAIR Right 6/2/2023    Procedure: KNEE ARTHROSCOPY WITH MEDIAL MENISCUS REPAIR (BUCKET HANDLE) RIGHT;  Surgeon: Maurisio Bhatia MD;  Location:  [facility];  Service: Orthopedics;  Laterality: Right;    KNEE MENISCAL REPAIR Right 3/18/2024    Procedure: KNEE ARTHROSCOPY WITH MEDIAL MENISCUS REPAIR- RIGHT;  Surgeon: Maurisio Bhatia MD;  Location:  [facility];  Service: Orthopedics;  Laterality: Right;    WISDOM TOOTH EXTRACTION         History reviewed. No pertinent family history.    Social History     Socioeconomic History    Marital status: Single   Tobacco Use    Smoking status: Never     Passive exposure: Never    Smokeless tobacco: Never   Vaping Use    Vaping status: Never Used   Substance and Sexual Activity    Alcohol use: Yes     Alcohol/week: 4.0 standard drinks of alcohol     Types: 4 Cans of beer per week     Comment: socially    Drug use: Yes     Frequency: 4.0 times per week     Types: Marijuana     Comment: socially    Sexual activity: Defer       Allergies   Allergen Reactions    Lexapro [Escitalopram] Other (See Comments)     " fatigue    Wellbutrin [Bupropion] Other (See Comments)     Agitation, ineffective    Prozac [Fluoxetine Hcl] Diarrhea       ROS    Review of Systems   Psychiatric/Behavioral:  Positive for stress. Negative for suicidal ideas. The patient is nervous/anxious.        Vitals:    03/18/25 1137   BP: 128/68   Pulse: 86   SpO2: 98%     Body mass index is 24.9 kg/m².      Current Outpatient Medications:     albuterol sulfate  (90 Base) MCG/ACT inhaler, Inhale 2 puffs Every 4 (Four) Hours As Needed for Wheezing or Shortness of Air., Disp: 18 g, Rfl: 0    Pseudoephedrine-guaiFENesin (MUCINEX D PO), Take 1 dose by mouth As Needed (allergies)., Disp: , Rfl:     busPIRone (BUSPAR) 5 MG tablet, Take 1 tablet by mouth 2 (Two) Times a Day., Disp: 60 tablet, Rfl: 2    desvenlafaxine (Pristiq) 50 MG 24 hr tablet, Take 1 tablet by mouth Daily for 30 days., Disp: 30 tablet, Rfl: 0    fluticasone (FLONASE) 50 MCG/ACT nasal spray, 2 sprays into the nostril(s) as directed by provider Daily. (Patient not taking: Reported on 3/18/2025), Disp: , Rfl:     PE    Physical Exam  Vitals reviewed.   Constitutional:       General: He is not in acute distress.  Pulmonary:      Effort: Pulmonary effort is normal. No respiratory distress.   Neurological:      Mental Status: He is alert.   Psychiatric:         Attention and Perception: He is inattentive.         Mood and Affect: Mood normal. Mood is anxious.         Speech: Speech normal.          A/P    Problem List Items Addressed This Visit          Mental Health    Recurrent major depressive disorder, in remission - Primary    Relevant Medications    busPIRone (BUSPAR) 5 MG tablet    desvenlafaxine (Pristiq) 50 MG 24 hr tablet    Anxiety     -Recommended follow-up with behavioral health to discuss possible PTSD diagnosis and ongoing anxiety surrounding family situations. The patient was given contact information for Dr. Harmon's office and encouraged to schedule an appointment. Continue  Pristiq at this time which he feels has been helpful for depression.   -Anxiety ongoing. He would like to trial buspar after discussion and continue Pristiq.   -RTC in 6 weeks for follow-up, sooner prn.     Plan of care was reviewed with patient at the conclusion of today's visit. Education was provided regarding diagnoses, management, prescribed or recommended OTC products, and the importance of compliance with follow-up appointments. The patient was counseled regarding the risks, benefits, and possible side-effects of treatment. I advised the patient to keep me informed of any acute changes in their status including new, worsening, or persistent symptoms. Patient expresses understanding and agreement with the management plan.        Pj Samuels PA-C

## 2025-03-20 ENCOUNTER — TELEMEDICINE (OUTPATIENT)
Age: 35
End: 2025-03-20
Payer: COMMERCIAL

## 2025-03-20 DIAGNOSIS — F33.1 MDD (MAJOR DEPRESSIVE DISORDER), RECURRENT EPISODE, MODERATE: ICD-10-CM

## 2025-03-20 DIAGNOSIS — F10.10 ALCOHOL USE DISORDER, MILD, ABUSE: ICD-10-CM

## 2025-03-20 DIAGNOSIS — F43.12 CHRONIC POST-TRAUMATIC STRESS DISORDER (PTSD): ICD-10-CM

## 2025-03-20 DIAGNOSIS — F41.1 GENERALIZED ANXIETY DISORDER WITH PANIC ATTACKS: Primary | ICD-10-CM

## 2025-03-20 DIAGNOSIS — Z51.81 ENCOUNTER FOR THERAPEUTIC DRUG MONITORING: ICD-10-CM

## 2025-03-20 DIAGNOSIS — F41.0 GENERALIZED ANXIETY DISORDER WITH PANIC ATTACKS: Primary | ICD-10-CM

## 2025-03-20 DIAGNOSIS — F12.20 CANNABIS USE DISORDER, SEVERE, DEPENDENCE: ICD-10-CM

## 2025-03-20 RX ORDER — DESVENLAFAXINE 50 MG/1
50 TABLET, FILM COATED, EXTENDED RELEASE ORAL DAILY
Qty: 30 TABLET | Refills: 0 | Status: SHIPPED | OUTPATIENT
Start: 2025-03-20 | End: 2025-04-19

## 2025-03-20 NOTE — PROGRESS NOTES
Initial Intake Video Visit      Date: 2025   Patient Name: Ronn Lebron  : 1990   MRN: [id number]   Time In: 2:02 pm      Time Out: 2:52 pm     Chaperone Statement: Michelle Pennington served as a chaperone for this visit and was present for the full visit from 14:02 to 14:52. Patient agreeable to chaperone presence during appointment.     Referring Physician: Pj Samuels PA-C    Chief Complaint:      ICD-10-CM ICD-9-CM   1. Generalized anxiety disorder with panic attacks  F41.1 300.02    F41.0 300.01   2. MDD (major depressive disorder), recurrent episode, moderate  F33.1 296.32   3. Chronic post-traumatic stress disorder (PTSD)  F43.12 309.81   4. Cannabis use disorder, severe, dependence  F12.20 304.30   5. Alcohol use disorder, mild, abuse  F10.10 305.00        Mode of Visit: Video  Location of patient: -HOME-  Location of provider: +[facility] [place] [facility]+  You have chosen to receive care through a telehealth visit.  The patient has signed the video visit consent form.  The visit included audio and video interaction. No technical issues occurred during this visit.    History of Present Illness: Ronn Lebron is a 34 y.o. male who I saw today via a video visit for an initial evaluation.    History of Present Illness  The patient is a 34-year-old male who presents via virtual visit for evaluation of depression, anxiety, PTSD, ADHD, and medication management.    He has a long-standing history of depression, dating back to his adolescence, which has been managed with various medications. He has been under psychiatric care since the age of 18 or 19, during which he was prescribed 7 or 8 different antidepressants before finding relief with desvenlafaxine (Pristiq). He has been on Pristiq 50 mg daily for a year, which he finds beneficial.     He reports that discontinuation of Pristiq results in heightened emotionality and uncontrolled anxiety related to his family's issues. He has a history of  outpatient therapy but is not currently engaged in any. He has never been admitted to an inpatient hospital for mental illness but has visited the ER for depression, where he was diagnosed with biological depression.     He has a history of suicidal ideation, with one attempt in 2022 during a depressive episode when he tried to overdose on Percocet. He reports no current suicidal or homicidal thoughts. He reports no history of self-harm attempts, physical or sexual abuse, but admits to emotional abuse from his parents.     He describes his mood as irritable and rates his feelings of hopelessness and loneliness as 10/10. He reports paranoia about his family's future actions, which he feels restricts him and keeps him indoors. He reports no hallucinations, past or present. He reports memory problems and believes he has ADHD, although he has never been diagnosed or treated for it. He reports no impulse control issues but admits to irritability and yelling.    He sleeps 9 to 10 hours a day and has been doing so for at least 2 years. He is unsure if he is depressed due to a lack of connection with his family. He reports a loss of interest in past activities, including gardening. He reports feelings of guilt and blames himself for not doing more as a child. He rates his concentration as 3 or 4 out of 10 and reports it is worsening. His appetite is normal, and he maintains his weight. He no longer stays in bed all day due to depression.     He paces when agitated and experiences debilitating anxiety and panic attacks around his family. He has been experiencing high levels of anxiety since childhood, which have been described as anxious and angry. He experiences daily irritability and restlessness and reports low energy levels. He experiences tension headaches. His anxiety affects his sleep, causing him to wake up 6 times a night to urinate. He does not snore.     He has been told he might have bipolar disorder but does not  believe he has experienced a manic episode. He reports no periods of needing little sleep or having excessive energy.     He has access to firearms but does not keep them loaded and has never used them against himself.    Recently, he has been engaging in therapy sessions and discussions with friends, during which he has identified potential symptoms of Post-Traumatic Stress Disorder (PTSD). These symptoms are characterized by specific triggers related to traumatic experiences from his childhood, which continue to impact him in adulthood.     His mother is an alcoholic, and his brother recently disclosed his own struggles with alcoholism. His father, who has undergone two divorces in the past seven years, is reluctant to discuss their tumultuous family history.     The past seven years have been particularly challenging for him, as they have resurfaced childhood traumas associated with his mother's alcoholism. These issues have also manifested in his own relationships, leading to debilitating anxiety that persists for weeks after interactions with his family. His last family gathering was two years ago, and he finds it difficult to explain his absence to his family, who he feels are emotionally distant and lack understanding. He is considering severing ties with his family due to the strain these relationships place on his mental health.    He has been prescribed buspirone 5 mg twice daily but has not yet started the medication.    He has been using Flonase nasal spray, which he purchases over the counter, and is seeking a prescription for it. He is no longer taking Zofran, aspirin 81 mg daily, or oxycodone. He continues to use Mucinex D.    He has been using marijuana habitually since the age of 15, smoking approximately four joints worth per day. He uses it to manage anxiety and has found it beneficial in calming his system. He has researched the type of marijuana he uses and discovered that it is often used for  PTSD. He prefers hybrid strains. He has used mushrooms in the past but does not use LSD or other research chemicals. He has used cocaine about five times in his life but does not have a habit of using it. He does not use crystal meth or opioids, except for post-surgery pain management. He has never used benzodiazepines like Xanax. He used to vape occasionally but no longer does so.    He consumes alcohol three or four nights a week, typically three shots over four hours, which he uses to cope with emotional distress caused by his family. His alcohol consumption has increased since his father's announcement of another divorce in January 2025. He can abstain from alcohol for two or three weeks without experiencing withdrawal symptoms. He has never required detox or rehab for alcohol use and has never attended Alcoholics Anonymous (AA) meetings.    He has a torn meniscus in his knee, which required surgery. The first surgery did not heal properly, and the second surgery, performed a year ago, is still healing. He did physical therapy for a while but stopped because it was not helpful. He experiences muscle weakness and still has not gained full functionality back yet.    SOCIAL HISTORY  The patient admits to drinking alcohol three or four nights a week, typically three shots over four hours, and smoking marijuana daily since he was 15 years old, consuming about four joints worth a day. He does not use tobacco or vape anymore.    FAMILY HISTORY  His mother is an alcoholic, and his brother announced that he is an alcoholic a few months ago.    MEDICATIONS  Current: desvenlafaxine (Pristiq), buspirone (BuSpar) (not yet started), fluticasone (Flonase) (over the counter), Mucinex D  Discontinued: Zofran, aspirin 81 mg, oxycodone    Subjective      Screening Scores:   PHQ-9 : 22  DASHA-7 : 14    Quality Measures:   Tobacco: Ronn Lebron  reports that he has never smoked. He has never been exposed to tobacco smoke. He  has never used smokeless tobacco. I have educated him on the risk of diseases from using tobacco products such as cancer, COPD, heart disease, and he is a non-smoker .     I spent 3  minutes counseling the patient.     Depression (PHQ >11): Patient screened positive for depression based on a PHQ-9 score of 22 on 3/20/2025. Follow-up recommendations include: Referral to Mental Health specialist, Suicide Risk Assessment performed, and Continue with medication management.                         Family History:   History reviewed. No pertinent family history.    Past Medical History:   Past Medical History:   Diagnosis Date    Anxiety     Asthma     Wears eyeglasses        Past Surgical History:   Past Surgical History:   Procedure Laterality Date    KNEE MENISCAL REPAIR Right 6/2/2023    Procedure: KNEE ARTHROSCOPY WITH MEDIAL MENISCUS REPAIR (BUCKET HANDLE) RIGHT;  Surgeon: Maurisio Bhatia MD;  Location: [facility];  Service: Orthopedics;  Laterality: Right;    KNEE MENISCAL REPAIR Right 3/18/2024    Procedure: KNEE ARTHROSCOPY WITH MEDIAL MENISCUS REPAIR- RIGHT;  Surgeon: Maurisio Bhatia MD;  Location: [facility];  Service: Orthopedics;  Laterality: Right;    WISDOM TOOTH EXTRACTION         Medications:     Current Outpatient Medications:     desvenlafaxine (Pristiq) 50 MG 24 hr tablet, Take 1 tablet by mouth Daily for 30 days., Disp: 30 tablet, Rfl: 0    albuterol sulfate  (90 Base) MCG/ACT inhaler, Inhale 2 puffs Every 4 (Four) Hours As Needed for Wheezing or Shortness of Air., Disp: 18 g, Rfl: 0    busPIRone (BUSPAR) 5 MG tablet, Take 1 tablet by mouth 2 (Two) Times a Day., Disp: 60 tablet, Rfl: 2    fluticasone (FLONASE) 50 MCG/ACT nasal spray, 2 sprays into the nostril(s) as directed by provider Daily. (Patient not taking: Reported on 3/18/2025), Disp: , Rfl:     Pseudoephedrine-guaiFENesin (MUCINEX D PO), Take 1 dose by mouth As Needed (allergies)., Disp: , Rfl:     Medication  Considerations:  AVELINA reviewed and appropriate.      Allergies:   Allergies   Allergen Reactions    Lexapro [Escitalopram] Other (See Comments)     fatigue    Wellbutrin [Bupropion] Other (See Comments)     Agitation, ineffective    Prozac [Fluoxetine Hcl] Diarrhea         Objective     Vital Signs:   Due to extenuating circumstances and possible current health risks associated with the patient being present in a clinical setting.the patient was seen remotely today via a video visit. Unable to obtain vital signs due to nature of remote visit.  Height stated at 65.51 inches.  Weight stated at 152 pounds.     Mental Status Exam:   MENTAL STATUS EXAM   General Appearance:  Cleanly groomed and dressed  Eye Contact:  Good eye contact  Attitude:  Cooperative  Motor Activity:  Normal gait, posture  Muscle Strength:  Normal  Speech:  Normal rate, tone, volume  Language:  Spontaneous  Mood and affect:  Irritable  Hopelessness:  10  Loneliness: 10  Thought Process:  Logical, goal-directed and linear  Associations/ Thought Content:  Nihilistic delusions  Hallucinations:  None  Suicidal Ideations:  Not present  Homicidal Ideation:  Not present  Sensorium:  Alert and clear  Orientation:  Person, place, time and situation  Immediate Recall, Recent, and Remote Memory:  Intact  Attention Span/ Concentration:  Easily distracted  Fund of Knowledge:  Appropriate for age and educational level  Intellectual Functioning:  Average range  Insight:  Fair  Judgement:  Fair  Reliability:  Fair  Impulse Control:  Fair       Results        SUICIDE RISK ASSESSMENT/CSSRS:  1. Does patient have thoughts of suicide? He denies  2. Does patient have intent for suicide? He denies  3. Does patient have a current plan for suicide? He denies  4. History of suicide attempts: one previous attempt  5. Family history of suicide or attempts: He denies  6. History of violent behaviors towards others or property or thoughts of committing suicide: He denies,  but likes to do [employer]  7. History of sexual aggression toward others: He denies  8. Access to firearms or weapons: has a gun and does not keep it under lock and key    Assessment / Plan      Visit Diagnosis/Orders Placed This Visit:  Diagnoses and all orders for this visit:    1. Generalized anxiety disorder with panic attacks (Primary)  -     desvenlafaxine (Pristiq) 50 MG 24 hr tablet; Take 1 tablet by mouth Daily for 30 days.  Dispense: 30 tablet; Refill: 0    2. MDD (major depressive disorder), recurrent episode, moderate  -     desvenlafaxine (Pristiq) 50 MG 24 hr tablet; Take 1 tablet by mouth Daily for 30 days.  Dispense: 30 tablet; Refill: 0    3. Chronic post-traumatic stress disorder (PTSD)  -     Ambulatory Referral to Behavioral Health    4. Cannabis use disorder, severe, dependence  -     Ambulatory Referral to Behavioral Health    5. Alcohol use disorder, mild, abuse  -     Ambulatory Referral to Behavioral Health         Assessment & Plan  1. Depression.  He has a long history of depression and has been on desvenlafaxine (Pristiq) 50 mg once a day for the past year, which has been effective. He will continue with Pristiq.    2. Generalized Anxiety Disorder.  He experiences debilitating anxiety, especially around family, and has been prescribed buspirone (BuSpar) 5 mg twice a day but has not started it yet. The potential side effects of buspirone, including dizziness, were discussed. If dizziness persists beyond 4 days, an alternative medication will be considered.    3. Post-Traumatic Stress Disorder (PTSD).  He reports symptoms consistent with PTSD, triggered by family interactions and past trauma. Therapy was recommended as the best treatment for PTSD. A referral to a therapist within the [facility] system will be made.    4. Attention Deficit Hyperactivity Disorder (ADHD).  He suspects he has ADHD but has never been formally diagnosed or treated for it. A computer-based exam for ADHD screening  will be scheduled.    5. Alcohol Use Disorder.  He drinks alcohol three to four nights a week, primarily as a coping mechanism for family-related stress. Attendance at Alcoholics Anonymous (AA) meetings was recommended.    6. Medication Management.  He requested a prescription for Flonase nasal spray, which he has been using over the counter. This will be discussed with his primary care physician. Discontinuation of Zofran, aspirin 81 mg daily, and oxycodone was confirmed.    Follow-up  The patient will follow up in 4 weeks.      Labs Reviewed : labs reviewed  Chart Reviewed : chart reviewed extensively    Functional Status: No impairment    Prognosis: Good with Ongoing Treatment     Impression/Formulation:  Patient appeared alert and oriented.  Patient is voluntarily requesting to begin outpatient treatment at [facility] [address].  Patient is receptive to assistance with maintaining a stable lifestyle.  Patient presents with history of     ICD-10-CM ICD-9-CM   1. Generalized anxiety disorder with panic attacks  F41.1 300.02    F41.0 300.01   2. MDD (major depressive disorder), recurrent episode, moderate  F33.1 296.32   3. Chronic post-traumatic stress disorder (PTSD)  F43.12 309.81   4. Cannabis use disorder, severe, dependence  F12.20 304.30   5. Alcohol use disorder, mild, abuse  F10.10 305.00   .     Treatment Plan:     Patient will continue supportive efforts and medications as indicated. Clinic will obtain release of information for current treatment team for continuity of care as needed. Patient will contact this office, call 911 or present to the nearest emergency room should suicidal or homicidal ideations occur. Discussed medication options and treatment plan of prescribed medication(s) as well as the risks, benefits, and potential side effects. Patient acknowledged and verbally consented to continue with current treatment plan and was educated on the importance of compliance  with treatment and follow-up appointments.     Follow Up:   Return in about 4 weeks (around 4/17/2025) for Recheck.    Short-term goals: Patient will adhere to medication regimen and experience continued improvement in symptoms over the next 3 months.   Long-term goals: Patient will adhere to medication treatment plan and report improvement in symptoms over the next 6 months      Follow Up:   Return in about 4 weeks (around 4/17/2025) for Recheck.    Patient or patient representative verbalized consent for the use of Ambient Listening during the visit with  Sam Linn MD for chart documentation. 3/20/2025  14:03 EDT    Sam Linn MD  [facility] [address]     This is electronically signed by Sam Linn MD  03/20/2025 14:01 EDT

## 2025-03-21 ENCOUNTER — CLINICAL SUPPORT (OUTPATIENT)
Age: 35
End: 2025-03-21
Payer: COMMERCIAL

## 2025-03-26 LAB
1OH-MIDAZOLAM UR QL SCN: NOT DETECTED NG/MG CREAT
6MAM UR QL SCN: NEGATIVE NG/ML
7AMINOCLONAZEPAM/CREAT UR: NOT DETECTED NG/MG CREAT
A-OH ALPRAZ/CREAT UR: NOT DETECTED NG/MG CREAT
A-OH-TRIAZOLAM/CREAT UR CFM: NOT DETECTED NG/MG CREAT
ACP UR QL CFM: NOT DETECTED
ALPRAZ/CREAT UR CFM: NOT DETECTED NG/MG CREAT
AMPHET UR CFM-MCNC: NOT DETECTED NG/MG CREAT
AMPHETAMINES UR QL SCN: NORMAL NG/ML
AMPHETAMINES UR QL: NEGATIVE
APAP UR QL SCN: NORMAL UG/ML
APAP UR QL: NORMAL
APAP UR-MCNC: PRESENT UG/ML
BARBITURATES UR QL SCN: NEGATIVE NG/ML
BENZODIAZ SCN METH UR: NEGATIVE
BUPRENORPHINE UR QL SCN: NEGATIVE
BUPRENORPHINE/CREAT UR: NOT DETECTED NG/MG CREAT
CANNABINOIDS UR QL CFM: NORMAL
CANNABINOIDS UR QL SCN: NORMAL NG/ML
CARBOXYTHC UR CFM-MCNC: 624 NG/MG CREAT
CARISOPRODOL UR QL: NEGATIVE NG/ML
CLONAZEPAM/CREAT UR CFM: NOT DETECTED NG/MG CREAT
COCAINE+BZE UR QL SCN: NEGATIVE NG/ML
CREAT UR-MCNC: 68 MG/DL
D-METHORPHAN UR-MCNC: NOT DETECTED NG/ML
D-METHORPHAN+LEVORPHANOL UR QL: PRESENT
DESALKYLFLURAZ/CREAT UR: NOT DETECTED NG/MG CREAT
DIAZEPAM/CREAT UR: NOT DETECTED NG/MG CREAT
ETG ETS UR QL CFM: NORMAL
ETHANOL UR QL SCN: NEGATIVE G/DL
ETHANOL UR QL SCN: NORMAL NG/ML
ETHYL GLUCURONIDE UR CFM-MCNC: 1415 NG/MG CREAT
ETHYL SULFATE UR CFM-MCNC: NOT DETECTED NG/MG CREAT
FENTANYL CTO UR SCN-MCNC: NEGATIVE NG/ML
FENTANYL/CREAT UR: NOT DETECTED NG/MG CREAT
FLUNITRAZEPAM UR QL SCN: NOT DETECTED NG/MG CREAT
GABAPENTIN UR-MCNC: NEGATIVE UG/ML
HALLUCINOGENS UR: NEGATIVE
HYPNOTICS UR QL SCN: NEGATIVE
KETAMINE UR QL: NOT DETECTED
LORAZEPAM/CREAT UR: NOT DETECTED NG/MG CREAT
MDA UR CFM-MCNC: NOT DETECTED NG/MG CREAT
MDMA UR CFM-MCNC: NOT DETECTED NG/MG CREAT
MEPERIDINE UR QL SCN: NEGATIVE NG/ML
METHADONE UR QL SCN: NEGATIVE NG/ML
METHADONE+METAB UR QL SCN: NEGATIVE NG/ML
METHAMPHET UR CFM-MCNC: NOT DETECTED NG/MG CREAT
MIDAZOLAM/CREAT UR CFM: NOT DETECTED NG/MG CREAT
MISCELLANEOUS, UR: NORMAL
NORBUPRENORPHINE/CREAT UR: NOT DETECTED NG/MG CREAT
NORDIAZEPAM/CREAT UR: NOT DETECTED NG/MG CREAT
NORFENTANYL/CREAT UR: NOT DETECTED NG/MG CREAT
NORFLUNITRAZEPAM UR-MCNC: NOT DETECTED NG/MG CREAT
NORKETAMINE UR-MCNC: NOT DETECTED UG/ML
OPIATES UR SCN-MCNC: NEGATIVE NG/ML
OXAZEPAM/CREAT UR: NOT DETECTED NG/MG CREAT
OXYCODONE CTO UR SCN-MCNC: NEGATIVE NG/ML
PCP UR QL SCN: NEGATIVE NG/ML
PRESCRIBED MEDICATIONS: NORMAL
PROPOXYPH UR QL SCN: NEGATIVE NG/ML
TAPENTADOL CTO UR SCN-MCNC: NEGATIVE NG/ML
TEMAZEPAM/CREAT UR: NOT DETECTED NG/MG CREAT
TRAMADOL UR QL SCN: NEGATIVE NG/ML
ZALEPLON UR-MCNC: NOT DETECTED NG/ML
ZOLPIDEM PHENYL-4-CARB UR QL SCN: NOT DETECTED
ZOLPIDEM UR QL SCN: NOT DETECTED
ZOPICLONE-N-OXIDE UR-MCNC: NOT DETECTED NG/ML

## 2025-04-30 ENCOUNTER — OFFICE VISIT (OUTPATIENT)
Dept: FAMILY MEDICINE CLINIC | Facility: CLINIC | Age: 35
End: 2025-04-30
Payer: COMMERCIAL

## 2025-04-30 VITALS
HEART RATE: 80 BPM | DIASTOLIC BLOOD PRESSURE: 64 MMHG | BODY MASS INDEX: 25.36 KG/M2 | HEIGHT: 66 IN | WEIGHT: 157.8 LBS | SYSTOLIC BLOOD PRESSURE: 120 MMHG | OXYGEN SATURATION: 96 %

## 2025-04-30 DIAGNOSIS — L98.9 SKIN LESION OF FACE: ICD-10-CM

## 2025-04-30 DIAGNOSIS — J45.30 MILD PERSISTENT ASTHMA WITHOUT COMPLICATION: ICD-10-CM

## 2025-04-30 DIAGNOSIS — F41.9 ANXIETY: Primary | ICD-10-CM

## 2025-04-30 DIAGNOSIS — F33.40 RECURRENT MAJOR DEPRESSIVE DISORDER, IN REMISSION: ICD-10-CM

## 2025-04-30 PROCEDURE — 1126F AMNT PAIN NOTED NONE PRSNT: CPT | Performed by: PHYSICIAN ASSISTANT

## 2025-04-30 PROCEDURE — 99214 OFFICE O/P EST MOD 30 MIN: CPT | Performed by: PHYSICIAN ASSISTANT

## 2025-04-30 RX ORDER — CETIRIZINE HYDROCHLORIDE 10 MG/1
10 TABLET ORAL DAILY
Qty: 30 TABLET | Refills: 11 | Status: SHIPPED | OUTPATIENT
Start: 2025-04-30

## 2025-04-30 RX ORDER — FLUTICASONE PROPIONATE 50 MCG
2 SPRAY, SUSPENSION (ML) NASAL DAILY
Qty: 16 G | Refills: 11 | Status: SHIPPED | OUTPATIENT
Start: 2025-04-30

## 2025-04-30 RX ORDER — FLUTICASONE PROPIONATE AND SALMETEROL 100; 50 UG/1; UG/1
1 POWDER RESPIRATORY (INHALATION)
Qty: 60 EACH | Refills: 5 | Status: SHIPPED | OUTPATIENT
Start: 2025-04-30

## 2025-04-30 NOTE — PROGRESS NOTES
"Chief Complaint   Patient presents with    Anxiety     6 week f/u for anxiety/ptsd  Referral for allergist   Needs refill for inhaler        HPI     Ronn Lebron is a 34 y.o. male who is here for 6 week follow-up of anxiety.     Buspirone has \"definitely helped\" with \"compartmentalizing anxiety\" despite ongoing family drama. He did get dizzy for about 1 week after starting the medication but this has resolved. He established with psychiatry after his last visit. He is scheduled for a follow-up appointment next week. He was tested for ADHD and also has an appointment to reestablish care with a therapist on 6/3/25.   He states a spot on his right face appeared about a year ago. His father has had melanoma.  He has been told he has asthma by an allergist in the past. He states he is using albuterol once every other day which improves his breathing and chest tightness.     Past Medical History:   Diagnosis Date    Anxiety     Asthma     Wears eyeglasses        Past Surgical History:   Procedure Laterality Date    KNEE MENISCAL REPAIR Right 6/2/2023    Procedure: KNEE ARTHROSCOPY WITH MEDIAL MENISCUS REPAIR (BUCKET HANDLE) RIGHT;  Surgeon: Maurisio Bhatia MD;  Location: [facility] OR;  Service: Orthopedics;  Laterality: Right;    KNEE MENISCAL REPAIR Right 3/18/2024    Procedure: KNEE ARTHROSCOPY WITH MEDIAL MENISCUS REPAIR- RIGHT;  Surgeon: Maurisio Bhatia MD;  Location: [facility] OR;  Service: Orthopedics;  Laterality: Right;    WISDOM TOOTH EXTRACTION         History reviewed. No pertinent family history.    Social History     Socioeconomic History    Marital status: Single   Tobacco Use    Smoking status: Never     Passive exposure: Never    Smokeless tobacco: Never   Vaping Use    Vaping status: Never Used   Substance and Sexual Activity    Alcohol use: Yes     Alcohol/week: 4.0 standard drinks of alcohol     Types: 4 Cans of beer per week     Comment: socially    Drug use: Yes     Frequency: 4.0 times per " week     Types: Marijuana     Comment: socially    Sexual activity: Defer       Allergies   Allergen Reactions    Lexapro [Escitalopram] Mental Status Change     fatigue    Wellbutrin [Bupropion] Mental Status Change     Agitation, ineffective    Prozac [Fluoxetine Hcl] Diarrhea       ROS    Review of Systems   HENT:  Positive for congestion.    Allergic/Immunologic: Positive for environmental allergies.       Vitals:    04/30/25 1121   BP: 120/64   Pulse: 80   SpO2: 96%     Body mass index is 25.85 kg/m².      Current Outpatient Medications:     albuterol sulfate  (90 Base) MCG/ACT inhaler, Inhale 2 puffs Every 4 (Four) Hours As Needed for Wheezing or Shortness of Air., Disp: 18 g, Rfl: 0    busPIRone (BUSPAR) 5 MG tablet, Take 1 tablet by mouth 2 (Two) Times a Day., Disp: 60 tablet, Rfl: 2    desvenlafaxine (Pristiq) 50 MG 24 hr tablet, Take 1 tablet by mouth Daily for 30 days., Disp: 30 tablet, Rfl: 0    fluticasone (FLONASE) 50 MCG/ACT nasal spray, Administer 2 sprays into the nostril(s) as directed by provider Daily., Disp: 16 g, Rfl: 11    Pseudoephedrine-guaiFENesin (MUCINEX D PO), Take 1 dose by mouth As Needed (allergies)., Disp: , Rfl:     cetirizine (zyrTEC) 10 MG tablet, Take 1 tablet by mouth Daily., Disp: 30 tablet, Rfl: 11    Fluticasone-Salmeterol (ADVAIR/WIXELA) 100-50 MCG/ACT DISKUS, Inhale 1 puff 2 (Two) Times a Day., Disp: 60 each, Rfl: 5    PE    Physical Exam  Vitals reviewed.   Constitutional:       General: He is not in acute distress.     Appearance: He is well-developed.   HENT:      Head: Normocephalic and atraumatic.     Eyes:      Conjunctiva/sclera: Conjunctivae normal.   Cardiovascular:      Rate and Rhythm: Normal rate and regular rhythm.      Heart sounds: Normal heart sounds. No murmur heard.  Pulmonary:      Effort: Pulmonary effort is normal.      Breath sounds: Normal breath sounds. No wheezing, rhonchi or rales.   Musculoskeletal:      Cervical back: Normal range of  motion.   Skin:     General: Skin is warm and dry.   Neurological:      Mental Status: He is alert.      Gait: Gait normal.   Psychiatric:         Speech: Speech normal.         Behavior: Behavior normal.         Results    Results for orders placed or performed in visit on 03/21/25   ToxAssure Flex 23, Ur -    Collection Time: 03/21/25  4:09 PM   Result Value Ref Range    Report Summary FINAL     CREATININE 68 mg/dL    Amphetamines, IA Comment CUTOFF:300 ng/mL    Benzodiazepines Negative     Diazepam Urine, Qualitative Not Detected ng/mg creat    Desmethyldiazepam Not Detected ng/mg creat    Oxazepam, urine Not Detected ng/mg creat    Temazepam Not Detected ng/mg creat    Alprazolam Urine, Conf Not Detected ng/mg creat    Alpha-hydroxyalprazolam, Urine Not Detected ng/mg creat    Desalkylflurazepam, Urine Not Detected ng/mg creat    Lorazepam, Urine Not Detected ng/mg creat    Alpha-hydroxytriazolam, Urine Not Detected ng/mg creat    Clonazepam Not Detected ng/mg creat    7- AMINOCLONAZEPAM Not Detected ng/mg creat    Midazolam, Urine Not Detected ng/mg creat    Alpha-hydroxymidazolam, Urine Not Detected ng/mg creat    Flunitrazepam Not Detected ng/mg creat    DESMETHYLFLUNITRAZEPAM Not Detected ng/mg creat    COCAINE / METABOLITE, IA Negative CUTOFF:150 ng/mL    Ethyl Alcohol, Enz Negative CUTOFF:0.020 g/dL    Ethanol and Ethanol Biomarkers Comment CUTOFF:500 ng/mL    Cannavinoids IA Comment CUTOFF:20 ng/mL    6-Acetylmorphine IA Negative CUTOFF:10 ng/mL    Opiate Class IA Negative CUTOFF:100 ng/mL    Oxycodone Class IA Negative CUTOFF:100 ng/mL    METHADONE, IA Negative CUTOFF:100 ng/mL    Methadone MTB IA Negative CUTOFF:100 ng/mL    Buprenorphine, Urine Negative     Buprenorphine, Urine Not Detected ng/mg creat    Norbuprenorphine Not Detected ng/mg creat    Fentanyl Negative     Fentanyl, Urine Not Detected ng/mg creat    Norfentanyl Urine Not Detected ng/mg creat    Tapentadol, IA Negative CUTOFF:200 ng/mL     MEPERIDINE, IA Negative CUTOFF:200 ng/mL    PROPOXYPHENE, IA Negative CUTOFF:300 ng/mL    TRAMADOL, IA Negative CUTOFF:200 ng/mL    Barbiturates, IA Negative CUTOFF:200 ng/mL    Other Hallucinogens Ur Negative     Ketamine Not Detected     Norketamine Not Detected     PHENCYCLIDINE, IA Negative CUTOFF:25 ng/mL    Gabapentin, IA Negative CUTOFF:1.0 ug/mL    Carisoprodol, IA Negative CUTOFF:100 ng/mL    SEDATIVES/HYPNOTICS Negative     Zolpidem(Ambien) Urine Not Detected     Zolpidem Acid Not Detected     Eszopiclone/Zopiclone Not Detected     Amino Chloropyridine Not Detected     Zaleplon, Ur Not Detected     Acetaminophen, IA Comment CUTOFF:5.0 ug/mL    Miscellaneous, Ur +POSITIVE+     DEXTROMETHORPHAN Not Detected     Dextrorphan/Levorphanol PRESENT    Amphetamines, MS, Ur RFX -    Collection Time: 03/21/25  4:09 PM   Result Value Ref Range    Amphetamines Confirmation Negative     METHAMPHETAMINE Not Detected ng/mg creat    AMPHETAMINE Not Detected ng/mg creat    MDMA-Ecstasy Not Detected ng/mg creat    MDA Not Detected ng/mg creat   Ethanol Biomarkers, MS, Ur RFX -    Collection Time: 03/21/25  4:09 PM   Result Value Ref Range    Ethanol Biomarkers Confirm +POSITIVE+     Ethyl Glucuronide 1,415 ng/mg creat    Ethyl Sulfate Not Detected ng/mg creat   Cannabinoids, MS, Ur RFX -    Collection Time: 03/21/25  4:09 PM   Result Value Ref Range    Cannabinoid Confirmation +POSITIVE+     Carboxy  ng/mg creat   Acetaminophen, MS, Ur RFX -    Collection Time: 03/21/25  4:09 PM   Result Value Ref Range    Acetaminophen Confirmation +POSITIVE+     Acetaminophen, Ur PRESENT        A/P    Problem List Items Addressed This Visit          Mental Health    Recurrent major depressive disorder, in remission    Anxiety - Primary     Other Visit Diagnoses         Skin lesion of face          Mild persistent asthma without complication        Relevant Medications    Fluticasone-Salmeterol (ADVAIR/WIXELA) 100-50 MCG/ACT DISKUS           - Buspirone has been helpful for his anxiety.  Plan is to continue Pristiq and buspirone at this time. Follow-up with behavioral health as scheduled next week.   -Encouraged the patient to use Zyrtec and Flonase as needed for his allergy flares. I will also prescribe Advair for him to use daily for his asthma.  -Recommended dermatology follow-up for his right facial lesion. He was encouraged to contact his insurance to see which dermatology office is excepted under his plan. He will let me know to which office he needs the referral sent.  -Return to clinic in 3 months for a routine physical, sooner as needed.    Plan of care was reviewed with patient at the conclusion of today's visit. Education was provided regarding diagnoses, management, and the importance of keeping follow-up appointments. The patient was counseled regarding the risks, benefits, and possible side-effects of treatment. Patient and/or family express understanding and agreement with the management plan.        Pj Samuels PA-C

## 2025-05-05 ENCOUNTER — OFFICE VISIT (OUTPATIENT)
Age: 35
End: 2025-05-05
Payer: COMMERCIAL

## 2025-05-05 VITALS
SYSTOLIC BLOOD PRESSURE: 124 MMHG | WEIGHT: 157.6 LBS | OXYGEN SATURATION: 97 % | HEIGHT: 66 IN | HEART RATE: 82 BPM | BODY MASS INDEX: 25.33 KG/M2 | DIASTOLIC BLOOD PRESSURE: 78 MMHG

## 2025-05-05 DIAGNOSIS — Z79.899 ON STIMULANT MEDICATION: ICD-10-CM

## 2025-05-05 DIAGNOSIS — F33.1 MDD (MAJOR DEPRESSIVE DISORDER), RECURRENT EPISODE, MODERATE: ICD-10-CM

## 2025-05-05 DIAGNOSIS — F41.0 GENERALIZED ANXIETY DISORDER WITH PANIC ATTACKS: ICD-10-CM

## 2025-05-05 DIAGNOSIS — F41.1 GENERALIZED ANXIETY DISORDER WITH PANIC ATTACKS: ICD-10-CM

## 2025-05-05 DIAGNOSIS — F90.2 ADHD (ATTENTION DEFICIT HYPERACTIVITY DISORDER), COMBINED TYPE: Primary | ICD-10-CM

## 2025-05-05 PROCEDURE — 1159F MED LIST DOCD IN RCRD: CPT | Performed by: PSYCHIATRY & NEUROLOGY

## 2025-05-05 PROCEDURE — 99214 OFFICE O/P EST MOD 30 MIN: CPT | Performed by: PSYCHIATRY & NEUROLOGY

## 2025-05-05 PROCEDURE — 1160F RVW MEDS BY RX/DR IN RCRD: CPT | Performed by: PSYCHIATRY & NEUROLOGY

## 2025-05-05 PROCEDURE — 96127 BRIEF EMOTIONAL/BEHAV ASSMT: CPT | Performed by: PSYCHIATRY & NEUROLOGY

## 2025-05-05 RX ORDER — DESVENLAFAXINE 50 MG/1
50 TABLET, FILM COATED, EXTENDED RELEASE ORAL DAILY
Qty: 30 TABLET | Refills: 0 | Status: SHIPPED | OUTPATIENT
Start: 2025-05-05 | End: 2025-06-04

## 2025-05-05 RX ORDER — BUSPIRONE HYDROCHLORIDE 5 MG/1
5 TABLET ORAL 2 TIMES DAILY
Qty: 60 TABLET | Refills: 0 | Status: SHIPPED | OUTPATIENT
Start: 2025-05-05

## 2025-05-05 RX ORDER — LISDEXAMFETAMINE DIMESYLATE 30 MG/1
30 CAPSULE ORAL EVERY MORNING
Qty: 30 CAPSULE | Refills: 0 | Status: SHIPPED | OUTPATIENT
Start: 2025-05-05 | End: 2025-06-04

## 2025-05-05 NOTE — PROGRESS NOTES
[facility]    Follow Up Office Visit      Date: 2025   Patient Name: Ronn Lebron  : 1990   MRN: [id number]     Referring Provider: Pj Samuels PA-C    Chaperone Statement: Gianfranco MORANWerner served as a chaperone for this visit and was present for the full visit from 15:45 to 16:00. Patient agreeable to chaperone presence during appointment.   Chief Complaint:      ICD-10-CM ICD-9-CM   1. ADHD (attention deficit hyperactivity disorder), combined type  F90.2 314.01   2. Generalized anxiety disorder with panic attacks  F41.1 300.02    F41.0 300.01   3. MDD (major depressive disorder), recurrent episode, moderate  F33.1 296.32   4. On stimulant medication  Z79.899 V58.69        History of Present Illness:   Ronn Lebron is a 34 y.o. male who is here for follow up with medication management.    History of Present Illness  The patient presents for evaluation of ADHD.    He has been on a regimen of buspirone 5 mg for the past 8 weeks, which he reports as beneficial in managing his anxiety. He also takes Pristiq daily, a medication he has found to be effective after trying several others over the past 10 to 15 years. He acknowledges that discontinuation of Pristiq leads to a rapid deterioration in his mental state, necessitating its reintroduction. He is open to the possibility of lifelong use of Pristiq due to its positive impact on his well-being.    He has undergone testing for ADHD and expresses interest in stimulant therapy, citing difficulties in maintaining focus. He has not previously been prescribed stimulants but has heard favorable reviews about Adderall and Vyvanse from his peers. He reports no cardiac issues and has not had an EKG. He maintains a high level of physical activity and overall health.    He has reduced his alcohol consumption, limiting it to 2 or 3 nights per week, primarily when interacting with his father. His marijuana use has  "decreased to approximately one joint per day. He expresses a desire to cease both alcohol and marijuana use, attributing their current use to self-medication for his mental health.    Interim History: He reports feeling \"more chill\" and less prone to \"freak out in the moments\" since starting buspirone. He describes the effects of Pristiq as stabilizing his mood and preventing emotional \"roller coasters.\" He has been drinking less and smoking less marijuana, reflecting on the reasons behind his substance use.    Social History:  - Limited alcohol consumption to interactions with his father  - High level of physical activity    Psychiatric History: He has tried five or six medications over the past 10 to 15 years, including Prozac, and found Pristiq to be the most effective.    Substance Use: He has reduced alcohol consumption to 2 or 3 nights per week and decreased marijuana use to one joint per day. He has been smoking marijuana since age 15.    Pertinent Negatives: No cardiac issues reported, no EKG performed.    SOCIAL HISTORY  The patient reports drinking less alcohol, now limited to two or three nights a week, primarily when interacting with his father. The patient admits to smoking approximately one joint of marijuana a day.        Subjective     Screening Scores:   PHQ-9 : 24  DASHA-7 : 21    Medications:     Current Outpatient Medications:     albuterol sulfate  (90 Base) MCG/ACT inhaler, Inhale 2 puffs Every 4 (Four) Hours As Needed for Wheezing or Shortness of Air., Disp: 18 g, Rfl: 0    busPIRone (BUSPAR) 5 MG tablet, Take 1 tablet by mouth 2 (Two) Times a Day., Disp: 60 tablet, Rfl: 0    cetirizine (zyrTEC) 10 MG tablet, Take 1 tablet by mouth Daily., Disp: 30 tablet, Rfl: 11    desvenlafaxine (Pristiq) 50 MG 24 hr tablet, Take 1 tablet by mouth Daily for 30 days., Disp: 30 tablet, Rfl: 0    fluticasone (FLONASE) 50 MCG/ACT nasal spray, Administer 2 sprays into the nostril(s) as directed by provider " "Daily., Disp: 16 g, Rfl: 11    Fluticasone-Salmeterol (ADVAIR/WIXELA) 100-50 MCG/ACT DISKUS, Inhale 1 puff 2 (Two) Times a Day., Disp: 60 each, Rfl: 5    Pseudoephedrine-guaiFENesin (MUCINEX D PO), Take 1 dose by mouth As Needed (allergies)., Disp: , Rfl:     lisdexamfetamine (Vyvanse) 30 MG capsule, Take 1 capsule by mouth Every Morning for 30 days, Disp: 30 capsule, Rfl: 0    Medication Considerations:  AVELINA reviewed and appropriate.     Allergies:   Allergies   Allergen Reactions    Lexapro [Escitalopram] Mental Status Change     fatigue    Wellbutrin [Bupropion] Mental Status Change     Agitation, ineffective    Prozac [Fluoxetine Hcl] Diarrhea       Objective     Vital Signs:   Vitals:    05/05/25 1542   BP: 124/78   Pulse: 82   SpO2: 97%   Weight: 71.5 kg (157 lb 9.6 oz)   Height: 166.4 cm (65.51\")     Body mass index is 25.82 kg/m².     Mental Status Exam:   MENTAL STATUS EXAM   General Appearance:  Cleanly groomed and dressed  Eye Contact:  Good eye contact  Attitude:  Cooperative  Motor Activity:  Normal gait, posture  Muscle Strength:  Normal  Speech:  Normal rate, tone, volume  Language:  Spontaneous  Mood and affect:  Normal, pleasant  Hopelessness:  Denies  Loneliness: Denies  Thought Process:  Logical, goal-directed and linear  Associations/ Thought Content:  No delusions  Hallucinations:  None  Suicidal Ideations:  Not present  Homicidal Ideation:  Not present  Sensorium:  Alert  Orientation:  Person, place, time and situation  Immediate Recall, Recent, and Remote Memory:  Intact  Attention Span/ Concentration:  Easily distracted  Fund of Knowledge:  Appropriate for age and educational level  Intellectual Functioning:  Average range  Insight:  Fair  Judgement:  Fair  Reliability:  Fair  Impulse Control:  Fair        SUICIDE RISK ASSESSMENT/CSSRS:  1. Does patient have thoughts of suicide? He denies  2. Does patient have intent for suicide? He denies  3. Does patient have a current plan for suicide? " He denies  4. History of suicide attempts: he denies  5. Family history of suicide or attempts: he denies  6. History of violent behaviors towards others or property or thoughts of committing suicide: he denies  7. History of sexual aggression toward others: he denies  8. Access to firearms or weapons: he denies    Assessment / Plan      Visit Diagnosis/Orders Placed This Visit:  Diagnoses and all orders for this visit:    1. ADHD (attention deficit hyperactivity disorder), combined type (Primary)  -     lisdexamfetamine (Vyvanse) 30 MG capsule; Take 1 capsule by mouth Every Morning for 30 days  Dispense: 30 capsule; Refill: 0    2. Generalized anxiety disorder with panic attacks  -     desvenlafaxine (Pristiq) 50 MG 24 hr tablet; Take 1 tablet by mouth Daily for 30 days.  Dispense: 30 tablet; Refill: 0  -     busPIRone (BUSPAR) 5 MG tablet; Take 1 tablet by mouth 2 (Two) Times a Day.  Dispense: 60 tablet; Refill: 0    3. MDD (major depressive disorder), recurrent episode, moderate  -     desvenlafaxine (Pristiq) 50 MG 24 hr tablet; Take 1 tablet by mouth Daily for 30 days.  Dispense: 30 tablet; Refill: 0    4. On stimulant medication  -     ECG 12 Lead; Future         Assessment & Plan  Problems:  - Attention deficit hyperactivity disorder (ADHD)  - Anxiety  - Depression  - Substance use    Content of Therapy:  During the session, the patient discussed the effectiveness of buspirone and Pristiq in managing anxiety and depression symptoms. He expressed interest in starting stimulant therapy for ADHD and acknowledged the negative impact of THC on his symptoms. The patient also explored his substance use, particularly marijuana and alcohol, and the reasons behind self-medication.    Clinical Impression:  The patient appears to be responding well to buspirone and Pristiq, with reported improvements in anxiety and depression symptoms. He is motivated to address ADHD symptoms and reduce substance use. There is a  noticeable willingness to engage in treatment and make healthier choices. No acute distress or significant changes in mental health status were observed during the session.    Therapeutic Intervention:  Cognitive-behavioral strategies were employed to reframe thoughts related to anxiety and depression. Psychoeducation was provided regarding the effects of THC on ADHD symptoms and the benefits of stimulant therapy. Motivational interviewing techniques were used to encourage reduction in substance use.    Plan:  - Continue buspirone 5 mg as prescribed.  - Continue Pristiq as prescribed.  - Initiate Vyvanse for ADHD management.  - Obtain an EKG to rule out arrhythmias or tachycardia before starting Vyvanse.  - Gradually reduce and discontinue marijuana and alcohol use.  - Engage in self-care activities and mindfulness exercises.    Follow-up:  The patient will follow up in 2 weeks to assess the response to Vyvanse and overall progress.    Notes & Risk Factors:  - Risk factors: History of substance use, potential exacerbation of ADHD symptoms by THC.  - Protective factors: Positive response to current medications, motivation to engage in treatment and make healthier choices.       Labs Reviewed : labs reviewed  UDS Reviewed : UDS reviewed and positive for THC and alcohol  Chart since last visit reviewed : chart reviewed    Results         Functional Status: No impairment    Prognosis: Good with Ongoing Treatment     Impression/Formulation:  Patient appeared alert and oriented. Patient is receptive to assistance with maintaining a stable lifestyle.  Patient presents with history of     ICD-10-CM ICD-9-CM   1. ADHD (attention deficit hyperactivity disorder), combined type  F90.2 314.01   2. Generalized anxiety disorder with panic attacks  F41.1 300.02    F41.0 300.01   3. MDD (major depressive disorder), recurrent episode, moderate  F33.1 296.32   4. On stimulant medication  Z79.899 V58.69   .     Treatment Plan:      Patient will continue supportive efforts and medications as indicated. Clinic will obtain release of information for current treatment team for continuity of care as needed. Patient will contact this office, call 911 or present to the nearest emergency room should suicidal or homicidal ideations occur.  Discussed medication options and treatment plan of prescribed medication(s) as well as the risks, benefits, and potential side effects. Patient acknowledged and verbally consented to continue with current treatment plan and was educated on the importance of compliance with treatment and follow-up appointments.       Quality Measures:  Tobacco: Ronn Lebron  reports that he has never smoked. He has never been exposed to tobacco smoke. He has never used smokeless tobacco. I have educated him on the risk of diseases from using tobacco products such as cancer, COPD, heart disease, and he is a non-smoker .   I spent  1  minute counseling the patient.          Depression (PHQ >11): Patient screened positive for depression based on a PHQ-9 score of 24 on 5/5/2025. Follow-up recommendations include: Prescribed antidepressant medication treatment, Suicide Risk Assessment performed, and Continue with medication management.       Follow Up:   Return in about 2 weeks (around 5/19/2025) for Recheck.    Short-term goals: Patient will adhere to medication regimen and experience continued improvement in symptoms over the next 3 months.   Long-term goals: Patient will adhere to medication treatment plan and report improvement in symptoms over the next 6 months    Patient or patient representative verbalized consent for the use of Ambient Listening during the visit with  Sam Linn MD for chart documentation. 5/5/2025  15:45 EDT    Sam Linn MD  [facility]     This is electronically signed by Sam Linn MD  05/05/2025 15:42 EDT

## 2025-05-06 ENCOUNTER — PRIOR AUTHORIZATION (OUTPATIENT)
Age: 35
End: 2025-05-06
Payer: COMMERCIAL

## 2025-05-06 NOTE — TELEPHONE ENCOUNTER
LVM that prior authorization was approved, and to contact pharmacy to check status of prescription fill.

## 2025-05-08 NOTE — TELEPHONE ENCOUNTER
PT called for an update on status of PA. Informed PT we submitted a PA on 5/6/25 which was approved. Informed PT id reach out to his pharmacy to gather further info. PT V/U

## 2025-05-08 NOTE — TELEPHONE ENCOUNTER
Called PT pharmacy to follow up on the status of Pts Vyvanse Rx. Per PT pharmacy the PA is showing as dismissed by the prescribing provider. Informed PT pharmacy we have an approval on our end, PT pharmacy stated it still showing as dismissed and provided no further explanation.     Will need to reach out to medicaid for further clarification.

## 2025-05-08 NOTE — TELEPHONE ENCOUNTER
Called PT pharmacy to f/u on Vyvanse Rx. PT pharmacy stated the Rx has been filled but not picked up and there will be a $0 OOP cost for the PT.

## 2025-05-08 NOTE — TELEPHONE ENCOUNTER
Called PT member ins plan to follow up on issues with Vyvanse Rx. The pharmacy help desk stated they show the PA is approved on their end and there are no issues.

## 2025-05-08 NOTE — TELEPHONE ENCOUNTER
Called [facility] UNLVM, VM not set up.     Needing to inform PT per pharmacy Vyvanse Rx is ready to be picked up and the PT will have a $0 OOP cost.

## 2025-05-09 NOTE — TELEPHONE ENCOUNTER
Patient called to check status of PA for Vyvanse. I explained that Medicaid prefers the brand-name drug, but the pharmacy originally sent a PA for the generic. When we called the pharmacy, we were intimally told the PA was deleted. I explained to the patient that the PA for the generic was withdrawn by our office, but a new PA for the brand-name was re-submitted and approved. Patient verbalized understanding, was appreciative, and will call the pharmacy to see when his prescription will be ready for pickup.

## 2025-05-20 ENCOUNTER — OFFICE VISIT (OUTPATIENT)
Age: 35
End: 2025-05-20
Payer: COMMERCIAL

## 2025-05-20 VITALS
SYSTOLIC BLOOD PRESSURE: 138 MMHG | BODY MASS INDEX: 25.71 KG/M2 | DIASTOLIC BLOOD PRESSURE: 86 MMHG | HEART RATE: 99 BPM | OXYGEN SATURATION: 98 % | WEIGHT: 160 LBS | HEIGHT: 66 IN

## 2025-05-20 DIAGNOSIS — F41.1 GENERALIZED ANXIETY DISORDER WITH PANIC ATTACKS: ICD-10-CM

## 2025-05-20 DIAGNOSIS — F41.0 GENERALIZED ANXIETY DISORDER WITH PANIC ATTACKS: ICD-10-CM

## 2025-05-20 DIAGNOSIS — F43.12 CHRONIC POST-TRAUMATIC STRESS DISORDER (PTSD): ICD-10-CM

## 2025-05-20 DIAGNOSIS — F33.1 MDD (MAJOR DEPRESSIVE DISORDER), RECURRENT EPISODE, MODERATE: ICD-10-CM

## 2025-05-20 DIAGNOSIS — F90.2 ADHD (ATTENTION DEFICIT HYPERACTIVITY DISORDER), COMBINED TYPE: Primary | ICD-10-CM

## 2025-05-20 RX ORDER — BUSPIRONE HYDROCHLORIDE 5 MG/1
5 TABLET ORAL 2 TIMES DAILY
Qty: 60 TABLET | Refills: 0 | Status: SHIPPED | OUTPATIENT
Start: 2025-05-20

## 2025-05-20 RX ORDER — DESVENLAFAXINE 50 MG/1
50 TABLET, FILM COATED, EXTENDED RELEASE ORAL DAILY
Qty: 30 TABLET | Refills: 0 | Status: SHIPPED | OUTPATIENT
Start: 2025-05-20 | End: 2025-06-19

## 2025-05-20 RX ORDER — LISDEXAMFETAMINE DIMESYLATE 40 MG/1
40 CAPSULE ORAL EVERY MORNING
Qty: 30 CAPSULE | Refills: 0 | Status: SHIPPED | OUTPATIENT
Start: 2025-05-20 | End: 2025-06-19

## 2025-05-20 NOTE — PROGRESS NOTES
[facility]    Follow Up Office Visit      Date: 2025   Patient Name: Ronn Lebron  : 1990   MRN: [id number]     Referring Provider: Pj Samuels PA-C    Chaperone Statement: Aman JAUREGUI served as a chaperone for this visit and was present for the full visit from 11:45 to 12:00. Patient agreeable to chaperone presence during appointment.   Chief Complaint:      ICD-10-CM ICD-9-CM   1. ADHD (attention deficit hyperactivity disorder), combined type  F90.2 314.01   2. MDD (major depressive disorder), recurrent episode, moderate  F33.1 296.32   3. Chronic post-traumatic stress disorder (PTSD)  F43.12 309.81   4. Generalized anxiety disorder with panic attacks  F41.1 300.02    F41.0 300.01        History of Present Illness:   Ronn Lebron is a 34 y.o. male who is here for follow up with medication management.    History of Present Illness  The patient presents for evaluation of anxiety and depression.    He has been adhering to his medication regimen for the past 10 days, reporting a noticeable improvement in his ability to focus on studies for extended periods. He reports no side effects from the medication. He has not yet completed the EKG test due to recent emotional stressors but plans to do so promptly. He acknowledges the positive impact of the medication on his cognitive function, particularly in organizing his thoughts.     He has significantly reduced his alcohol consumption and marijuana use, attributing this to the medication's effect on his concentration. He expresses a desire to further reduce his marijuana use. He continues to take BuSpar 5 mg twice daily without any adverse effects. He reports a 10% to 15% reduction in anxiety levels and no recent panic attacks.     He believes the Vyvanse aids in thought organization, which he finds beneficial. He reports no paranoia or hallucinations and notes an improvement in memory and concentration,  "although he would like to see further enhancement. He reports no issues with impulse control.    He is currently experiencing depression due to familial issues but anticipates relief as he distances himself from these stressors. He describes his current mood as ambiguous loss and reports feelings of hopelessness and loneliness, rating the latter at 6 or 7 out of 10. He is currently seeing an online therapist at  once a month and is open to a referral for additional therapy.    Interim History: He reports an emotional week, describing it as \"the most emotional week ever.\" He has been able to organize his thoughts better, particularly in approaching his brothers. He feels the Vyvanse has helped him get his thoughts together, although he is still experiencing emotional distress.    Social History:  - Family issues: Parents  10 years ago, father remarried 7 years ago, causing familial discord.  - No children.  - Reduced alcohol consumption.  - Reduced marijuana use.    Substance Use:  - Alcohol: Significantly reduced, desires to be a social drinker.  - Marijuana: Reduced use, smoked daily for 20 years.  - Tobacco: No use.    Pertinent Negatives:  - No side effects from current medications.  - No recent panic attacks.  - No paranoia.  - No hallucinations.  - No impulse control issues.    SOCIAL HISTORY  The patient reports a significant decrease in alcohol consumption and aims to be a social drinker, consuming alcohol once every couple of weeks. The patient does not use tobacco products but admits to smoking weed significantly less than before.        Subjective     Screening Scores:   PHQ-9 : 9  DASHA-7 : 14    Medications:     Current Outpatient Medications:     albuterol sulfate  (90 Base) MCG/ACT inhaler, Inhale 2 puffs Every 4 (Four) Hours As Needed for Wheezing or Shortness of Air., Disp: 18 g, Rfl: 0    busPIRone (BUSPAR) 5 MG tablet, Take 1 tablet by mouth 2 (Two) Times a Day., Disp: 60 tablet, " "Rfl: 0    cetirizine (zyrTEC) 10 MG tablet, Take 1 tablet by mouth Daily., Disp: 30 tablet, Rfl: 11    desvenlafaxine (Pristiq) 50 MG 24 hr tablet, Take 1 tablet by mouth Daily for 30 days., Disp: 30 tablet, Rfl: 0    fluticasone (FLONASE) 50 MCG/ACT nasal spray, Administer 2 sprays into the nostril(s) as directed by provider Daily., Disp: 16 g, Rfl: 11    Fluticasone-Salmeterol (ADVAIR/WIXELA) 100-50 MCG/ACT DISKUS, Inhale 1 puff 2 (Two) Times a Day., Disp: 60 each, Rfl: 5    lisdexamfetamine (Vyvanse) 40 MG capsule, Take 1 capsule by mouth Every Morning for 30 days, Disp: 30 capsule, Rfl: 0    Pseudoephedrine-guaiFENesin (MUCINEX D PO), Take 1 dose by mouth As Needed (allergies)., Disp: , Rfl:     Medication Considerations:  AVELINA reviewed and appropriate.     Allergies:   Allergies   Allergen Reactions    Lexapro [Escitalopram] Mental Status Change     fatigue    Wellbutrin [Bupropion] Mental Status Change     Agitation, ineffective    Prozac [Fluoxetine Hcl] Diarrhea       Objective     Vital Signs:   Vitals:    05/20/25 1125   BP: 138/86   Pulse: 99   SpO2: 98%   Weight: 72.6 kg (160 lb)   Height: 166.4 cm (65.51\")     Body mass index is 26.21 kg/m².     Mental Status Exam:   MENTAL STATUS EXAM   General Appearance:  Cleanly groomed and dressed  Eye Contact:  Good eye contact  Attitude:  Cooperative  Motor Activity:  Normal gait, posture  Muscle Strength:  Normal  Speech:  Normal rate, tone, volume  Language:  Spontaneous  Mood and affect:  Euthymic  Hopelessness:  2  Loneliness: 6  Thought Process:  Logical, goal-directed and linear  Associations/ Thought Content:  No delusions  Hallucinations:  None  Suicidal Ideations:  Not present  Homicidal Ideation:  Not present  Sensorium:  Alert and clear  Orientation:  Person, place, time and situation  Immediate Recall, Recent, and Remote Memory:  Intact  Attention Span/ Concentration:  Good  Fund of Knowledge:  Appropriate for age and educational level  Intellectual " Functioning:  Average range  Insight:  Fair  Judgement:  Fair  Reliability:  Fair  Impulse Control:  Good        SUICIDE RISK ASSESSMENT/CSSRS:  1. Does patient have thoughts of suicide? He denies  2. Does patient have intent for suicide? He denies  3. Does patient have a current plan for suicide? He denies  4. History of suicide attempts: he denies  5. Family history of suicide or attempts: he denies  6. History of violent behaviors towards others or property or thoughts of committing suicide: he denies  7. History of sexual aggression toward others: he denies  8. Access to firearms or weapons: he denies    Assessment / Plan      Visit Diagnosis/Orders Placed This Visit:  Diagnoses and all orders for this visit:    1. ADHD (attention deficit hyperactivity disorder), combined type (Primary)  -     lisdexamfetamine (Vyvanse) 40 MG capsule; Take 1 capsule by mouth Every Morning for 30 days  Dispense: 30 capsule; Refill: 0    2. MDD (major depressive disorder), recurrent episode, moderate  -     desvenlafaxine (Pristiq) 50 MG 24 hr tablet; Take 1 tablet by mouth Daily for 30 days.  Dispense: 30 tablet; Refill: 0    3. Chronic post-traumatic stress disorder (PTSD)  -     Ambulatory Referral to Behavioral Health    4. Generalized anxiety disorder with panic attacks  -     desvenlafaxine (Pristiq) 50 MG 24 hr tablet; Take 1 tablet by mouth Daily for 30 days.  Dispense: 30 tablet; Refill: 0  -     busPIRone (BUSPAR) 5 MG tablet; Take 1 tablet by mouth 2 (Two) Times a Day.  Dispense: 60 tablet; Refill: 0         Assessment & Plan  Problems:  - Anxiety  - Depression    Content of Therapy:  During the session, the patient discussed his recent emotional week and the impact of medication on his symptoms. He reported a mild effect from the medication and noted improvements in concentration and reduced alcohol and marijuana use. The patient also shared his experience of cutting ties with family members, which has been emotionally  challenging. He expressed a desire to increase therapy sessions to better manage his feelings of depression and anxiety.    Clinical Impression:  The patient exhibits a mild reduction in anxiety symptoms, with a reported 10-15% decrease. He is experiencing significant emotional distress due to recent family conflicts, leading to feelings of depression and loneliness. Despite these challenges, he has shown progress in reducing substance use and improving impulse control. The patient appears motivated to continue treatment and is responsive to therapeutic interventions.    Therapeutic Intervention:  - Cognitive-behavioral strategies to reframe negative thoughts and improve concentration.  - Psychoeducation on the effects of marijuana on cognitive function.  - Encouragement to reduce substance use, particularly marijuana and alcohol.  - Support in managing family-related stress and emotional challenges.    Plan:  - Continue BuSpar 5 mg twice daily.  - Increase Vyvanse to 40 mg daily.  - Complete EKG as soon as possible.  - Referral to a local therapist for more frequent sessions.  - Self-care instructions to include mindfulness exercises and stress management techniques.    Follow-up:  - Follow-up appointment scheduled for 4 weeks via video consultation.  - Goals for the next session include evaluating the effectiveness of the increased Vyvanse dosage and discussing progress in therapy.    Notes & Risk Factors:  - No current risk factors for harm to self or others reported.  - Protective factors include motivation to improve mental health and willingness to engage in therapy.       Labs Reviewed : labs reviewed  EKG Reviewed : needs to get one  UDS Reviewed: UDS reviewed and positive for Ethyl Glucuronide:1415 ng/mg creat and THC   Chart since last visit reviewed : chart reviewed    Results         Functional Status: No impairment    Prognosis: Good with Ongoing Treatment     Impression/Formulation:  Patient appeared  alert and oriented. Patient is receptive to assistance with maintaining a stable lifestyle.  Patient presents with history of     ICD-10-CM ICD-9-CM   1. ADHD (attention deficit hyperactivity disorder), combined type  F90.2 314.01   2. MDD (major depressive disorder), recurrent episode, moderate  F33.1 296.32   3. Chronic post-traumatic stress disorder (PTSD)  F43.12 309.81   4. Generalized anxiety disorder with panic attacks  F41.1 300.02    F41.0 300.01   .     Treatment Plan:     Patient will continue supportive efforts and medications as indicated. Clinic will obtain release of information for current treatment team for continuity of care as needed. Patient will contact this office, call 911 or present to the nearest emergency room should suicidal or homicidal ideations occur.  Discussed medication options and treatment plan of prescribed medication(s) as well as the risks, benefits, and potential side effects. Patient acknowledged and verbally consented to continue with current treatment plan and was educated on the importance of compliance with treatment and follow-up appointments.       Quality Measures:  Tobacco: Ronn Lebron  reports that he has never smoked. He has never been exposed to tobacco smoke. He has never used smokeless tobacco. I have educated him on the risk of diseases from using tobacco products such as cancer, COPD, heart disease, and he is a non-smoker .   I spent  1  minute counseling the patient.          Depression (PHQ >10): Patient screened negative for depression based on a PHQ-9 score of 9 on 5/20/2025. Follow up recommendations include: Prescribed antidepressant medication treatment, Suicide Risk Assessment performed, and Continue with medication management     Follow Up:   Return in about 4 weeks (around 6/17/2025) for Recheck, Video visit.    Short-term goals: Patient will adhere to medication regimen and experience continued improvement in symptoms over the next 3 months.    Long-term goals: Patient will adhere to medication treatment plan and report improvement in symptoms over the next 6 months    Patient or patient representative verbalized consent for the use of Ambient Listening during the visit with  Sam Linn MD for chart documentation. 5/20/2025  11:45 EDT    Sam Linn MD  [facility]     This is electronically signed by Sam Linn MD  05/20/2025 11:47 EDT

## 2025-05-21 ENCOUNTER — HOSPITAL ENCOUNTER (OUTPATIENT)
Dept: CARDIOLOGY | Facility: HOSPITAL | Age: 35
Discharge: HOME | End: 2025-05-21
Admitting: PSYCHIATRY & NEUROLOGY
Payer: COMMERCIAL

## 2025-05-21 DIAGNOSIS — Z79.899 ON STIMULANT MEDICATION: ICD-10-CM

## 2025-05-21 PROCEDURE — 93005 ELECTROCARDIOGRAM TRACING: CPT | Performed by: PSYCHIATRY & NEUROLOGY

## 2025-05-26 LAB
QT INTERVAL: 366 MS
QTC INTERVAL: 457 MS

## 2025-05-27 RX ORDER — FLUTICASONE PROPIONATE 50 MCG
2 SPRAY, SUSPENSION (ML) NASAL DAILY
Qty: 16 G | Refills: 11 | OUTPATIENT
Start: 2025-05-27

## 2025-05-27 RX ORDER — FLUTICASONE PROPIONATE AND SALMETEROL 100; 50 UG/1; UG/1
1 POWDER RESPIRATORY (INHALATION)
Qty: 60 EACH | Refills: 5 | Status: SHIPPED | OUTPATIENT
Start: 2025-05-27

## 2025-05-27 NOTE — TELEPHONE ENCOUNTER
Rx Refill Note  Requested Prescriptions     Pending Prescriptions Disp Refills    Fluticasone-Salmeterol (ADVAIR/WIXELA) 100-50 MCG/ACT DISKUS 60 each 5     Sig: Inhale 1 puff 2 (Two) Times a Day.      Last office visit with prescribing clinician: 4/30/2025   Last telemedicine visit with prescribing clinician: Visit date not found   Next office visit with prescribing clinician: 5/23/2025     Neil Hodge MA  05/27/25, 10:06 EDT

## 2025-06-02 ENCOUNTER — TELEMEDICINE (OUTPATIENT)
Age: 35
End: 2025-06-02
Payer: COMMERCIAL

## 2025-06-02 DIAGNOSIS — F33.40 RECURRENT MAJOR DEPRESSIVE DISORDER, IN REMISSION: ICD-10-CM

## 2025-06-02 DIAGNOSIS — F41.9 ANXIETY: Primary | ICD-10-CM
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
## 2025-06-19 ENCOUNTER — TELEMEDICINE (OUTPATIENT)
Age: 35
End: 2025-06-19
Payer: COMMERCIAL

## 2025-06-19 ENCOUNTER — TELEPHONE (OUTPATIENT)
Age: 35
End: 2025-06-19

## 2025-06-19 DIAGNOSIS — F90.2 ADHD (ATTENTION DEFICIT HYPERACTIVITY DISORDER), COMBINED TYPE: ICD-10-CM

## 2025-06-19 DIAGNOSIS — F41.0 GENERALIZED ANXIETY DISORDER WITH PANIC ATTACKS: ICD-10-CM

## 2025-06-19 DIAGNOSIS — F33.1 MDD (MAJOR DEPRESSIVE DISORDER), RECURRENT EPISODE, MODERATE: ICD-10-CM

## 2025-06-19 DIAGNOSIS — F41.1 GENERALIZED ANXIETY DISORDER WITH PANIC ATTACKS: ICD-10-CM

## 2025-06-19 RX ORDER — LISDEXAMFETAMINE DIMESYLATE 50 MG/1
50 CAPSULE ORAL EVERY MORNING
Qty: 30 CAPSULE | Refills: 0 | Status: SHIPPED | OUTPATIENT
Start: 2025-06-19 | End: 2025-07-19

## 2025-06-19 RX ORDER — DESVENLAFAXINE 50 MG/1
50 TABLET, FILM COATED, EXTENDED RELEASE ORAL DAILY
Qty: 30 TABLET | Refills: 0 | Status: SHIPPED | OUTPATIENT
Start: 2025-06-19 | End: 2025-07-19

## 2025-06-19 RX ORDER — BUSPIRONE HYDROCHLORIDE 5 MG/1
5 TABLET ORAL 2 TIMES DAILY
Qty: 60 TABLET | Refills: 0 | Status: SHIPPED | OUTPATIENT
Start: 2025-06-19

## 2025-06-19 NOTE — PROGRESS NOTES
"    Video Visit Follow Up      Date: 2025  Patient Name: Ronn Lebron  : 1990   MRN: [id number]   Time In: 2:20 pm      Time Out: 2:30 pm     Referring Physician: Pj Samuels PA-C  Chaperone Statement: Aman SHANIA served as a chaperone for this visit and was present for the full visit from 14:20 to 14:30. Patient agreeable to chaperone presence during appointment.   Chief Complaint:      ICD-10-CM ICD-9-CM   1. Generalized anxiety disorder with panic attacks  F41.1 300.02    F41.0 300.01   2. MDD (major depressive disorder), recurrent episode, moderate  F33.1 296.32   3. ADHD (attention deficit hyperactivity disorder), combined type  F90.2 314.01        Mode of Visit: Video  Location of patient: -HOME-  Location of provider: +[facility]  You have chosen to receive care through a telehealth visit.  The patient has signed the video visit consent form.  The visit included audio and video interaction. No technical issues occurred during this visit.    History of Present Illness: Ronn Lebron is a 34 y.o. male who I saw today thru a video visit for medication management.     History of Present Illness  The patient presents via virtual visit for ADHD, anxiety, and depression.    He reports a significant improvement in his condition since starting Vyvanse, with a clear distinction between the periods when he is on and off the medication. He has not experienced any side effects from Vyvanse. He describes a noticeable difference in his ADHD symptoms at night when the medication's effects subside, stating \"it's just been crazy realizing at night when it wears off like that's kind of the ADHD I've been dealing with my whole life.\" He is interested in exploring mental exercises to help manage his symptoms during the transition period when the medication's effects are wearing off. He has been practicing breathing exercises and posture correction. He reports no issues with impulse control and " "notes a gradual improvement in his condition since starting the medication. He continues to take buspirone 5 mg twice daily and Pristiq 50 mg.    He reports no suicidal ideation or panic attacks. His mood has improved, although he acknowledges some familial challenges. He has recently started therapy and is scheduled for weekly sessions. He reports a decrease in feelings of hopelessness, stating \"a lot less drama, again it was like the alcohol was for the trauma and the marijuana was for the ADHD pretty much.\" He consumes alcohol occasionally and uses marijuana, particularly when he feels the effects of Vyvanse wearing off. He reports no current alcohol use. He has been abstaining from family contact for the past 2 weeks which has improved his anxiety.  Since starting the Vyvanse and reducing the alcohol and marijuana use, he has had improved sleep, reduced bathroom visits, and enhanced abdominal breathing. He also notes an improvement in his posture.    Social History:  - Familial challenges  - Recently started therapy with weekly sessions    Substance Use:  - Occasional alcohol consumption  - Marijuana use, particularly when Vyvanse wears off    Pertinent Negatives:  - No suicidal ideation  - No panic attacks  - No impulse control issues    SOCIAL HISTORY  He admits to drinking alcohol a few nights ago. He uses marijuana at night if he stays awake past when his Vyvanse wears off.        Subjective      Review of Systems:   The following portions of the patient's history were reviewed and updated as appropriate: allergies, current medications, past family history, past medical history, past social history, past surgical history and problem list.     Screening Scores:   PHQ-9 : 9  DASHA-7 : 7    Quality Measures:   Tobacco: Ronn Macedo Vi  reports that he has never smoked. He has never been exposed to tobacco smoke. He has never used smokeless tobacco. I have educated him on the risk of diseases from using " tobacco products such as cancer, COPD, heart disease, and he is a non-smoker.   I spent 1 minute counseling the patient.          Depression (PHQ >10): Patient screened negative for depression based on a PHQ-9 score of 9 on 6/19/2025. Follow up recommendations include: Prescribed antidepressant medication treatment, Suicide Risk Assessment performed, and Continue with medication management     Medications:     Current Outpatient Medications:     busPIRone (BUSPAR) 5 MG tablet, Take 1 tablet by mouth 2 (Two) Times a Day., Disp: 60 tablet, Rfl: 0    desvenlafaxine (Pristiq) 50 MG 24 hr tablet, Take 1 tablet by mouth Daily for 30 days., Disp: 30 tablet, Rfl: 0    lisdexamfetamine (Vyvanse) 50 MG capsule, Take 1 capsule by mouth Every Morning for 30 days, Disp: 30 capsule, Rfl: 0    albuterol sulfate  (90 Base) MCG/ACT inhaler, Inhale 2 puffs Every 4 (Four) Hours As Needed for Wheezing or Shortness of Air., Disp: 18 g, Rfl: 0    cetirizine (zyrTEC) 10 MG tablet, Take 1 tablet by mouth Daily., Disp: 30 tablet, Rfl: 11    fluticasone (FLONASE) 50 MCG/ACT nasal spray, Administer 2 sprays into the nostril(s) as directed by provider Daily., Disp: 16 g, Rfl: 11    Fluticasone-Salmeterol (ADVAIR/WIXELA) 100-50 MCG/ACT DISKUS, Inhale 1 puff 2 (Two) Times a Day., Disp: 60 each, Rfl: 5    Pseudoephedrine-guaiFENesin (MUCINEX D PO), Take 1 dose by mouth As Needed (allergies)., Disp: , Rfl:     Medication Considerations:  AVELINA reviewed and appropriate.     Allergies:   Allergies   Allergen Reactions    Lexapro [Escitalopram] Mental Status Change     fatigue    Wellbutrin [Bupropion] Mental Status Change     Agitation, ineffective    Prozac [Fluoxetine Hcl] Diarrhea       Objective       Vital Signs:   The patient was seen remotely today via a video visit. Unable to obtain vital signs due to nature of remote visit. Patient stated that their height was 65 inches and their weight was 160 pounds.     Mental Status Exam:    MENTAL STATUS EXAM   General Appearance:  Cleanly groomed and dressed  Eye Contact:  Good eye contact  Attitude:  Cooperative  Motor Activity:  Normal gait, posture  Muscle Strength:  Normal  Speech:  Normal rate, tone, volume  Language:  Spontaneous  Mood and affect:  Euthymic  Hopelessness:  Denies  Loneliness: Denies  Thought Process:  Logical, goal-directed and linear  Associations/ Thought Content:  No delusions  Hallucinations:  None  Suicidal Ideations:  Not present  Homicidal Ideation:  Not present  Sensorium:  Alert and clear  Orientation:  Person, place, time and situation  Immediate Recall, Recent, and Remote Memory:  Intact  Attention Span/ Concentration:  Good  Fund of Knowledge:  Appropriate for age and educational level  Intellectual Functioning:  Average range  Insight:  Fair  Judgement:  Fair  Reliability:  Fair  Impulse Control:  Fair        SUICIDE RISK ASSESSMENT/CSSRS:  1. Does patient have thoughts of suicide? denies  2. Does patient have intent for suicide? denies  3. Does patient have a current plan for suicide? denies  4. History of suicide attempts: denies  5. Family history of suicide or attempts: denies  6. History of violent behaviors towards others or property or thoughts of committing suicide: denies  7. History of sexual aggression toward others: denies  8. Access to firearms or weapons: denies    Assessment / Plan      Visit Diagnosis/Orders Placed This Visit:  Diagnoses and all orders for this visit:    1. Generalized anxiety disorder with panic attacks  -     busPIRone (BUSPAR) 5 MG tablet; Take 1 tablet by mouth 2 (Two) Times a Day.  Dispense: 60 tablet; Refill: 0  -     desvenlafaxine (Pristiq) 50 MG 24 hr tablet; Take 1 tablet by mouth Daily for 30 days.  Dispense: 30 tablet; Refill: 0    2. MDD (major depressive disorder), recurrent episode, moderate  -     desvenlafaxine (Pristiq) 50 MG 24 hr tablet; Take 1 tablet by mouth Daily for 30 days.  Dispense: 30 tablet; Refill:  0    3. ADHD (attention deficit hyperactivity disorder), combined type  -     lisdexamfetamine (Vyvanse) 50 MG capsule; Take 1 capsule by mouth Every Morning for 30 days  Dispense: 30 capsule; Refill: 0         Assessment & Plan  Problems:  - Attention deficit hyperactivity disorder (ADHD)  - Anxiety  - Depression  - Elevated heart rate    Content of Therapy:  During the session, the patient discussed the noticeable difference in symptoms when on and off Vyvanse, highlighting the improvement in ADHD symptoms. The patient also mentioned experiencing racing thoughts at night when the medication wears off. The conversation included discussions about anxiety and depression, with the patient reporting improvements in both areas. The patient shared that distancing from family has led to physiological changes and a reduction in anxiety symptoms. The patient expressed interest in mental exercises to manage symptoms when the medication wears off and was provided with suggestions for breathing exercises and muscle relaxation techniques.    Clinical Impression:  The patient demonstrates significant improvement in ADHD symptoms with Vyvanse, although racing thoughts persist at night when the medication wears off. Anxiety symptoms have improved with current medications and therapy, and the patient reports better sleep and reduced physiological stress responses. Depression symptoms have decreased, with the patient noting a reduction in feelings of hopelessness and an overall improvement in mood. The patient's heart rate was slightly elevated at the last visit, and monitoring is recommended to ensure the stimulant medication is not causing an increase in heart rate.    Therapeutic Intervention:  - Breathing exercises  - Muscle relaxation techniques  - Continued therapy sessions    Plan:  - Increase Vyvanse to 50 mg to manage nighttime racing thoughts and reduce reliance on marijuana  - Continue buspirone 5 mg twice a day  -  Continue Pristiq 50 mg daily  - Continue weekly therapy sessions  - Monitor heart rate using a Fitbit    Follow-up:  The patient will follow up in 4 weeks via video connect.        Labs Reviewed : labs reviewed  EKG Reviewed : pending  Chart since last visit reviewed : chart reviewed    Functional Status: No impairment    Prognosis: Good with Ongoing Treatment     Impression/Formulation:  Patient appeared alert and oriented. Patient is receptive to assistance with maintaining a stable lifestyle.  Patient presents with history of     ICD-10-CM ICD-9-CM   1. Generalized anxiety disorder with panic attacks  F41.1 300.02    F41.0 300.01   2. MDD (major depressive disorder), recurrent episode, moderate  F33.1 296.32   3. ADHD (attention deficit hyperactivity disorder), combined type  F90.2 314.01   .     Treatment Plan:     Patient will continue supportive efforts and medications as indicated. Clinic will obtain release of information for current treatment team for continuity of care as needed. Patient will contact this office, call 911 or present to the nearest emergency room should suicidal or homicidal ideations occur.  Discussed medication options and treatment plan of prescribed medication(s) as well as the risks, benefits, and potential side effects. Patient acknowledged and verbally consented to continue with current treatment plan and was educated on the importance of compliance with treatment and follow-up appointments.       Follow Up:   Return in about 4 weeks (around 7/17/2025) for Recheck, Video visit.    Short-term goals: Patient will adhere to medication regimen and experience continued improvement in symptoms over the next 3 months.   Long-term goals: Patient will adhere to medication treatment plan and report improvement in symptoms over the next 6 months    Patient or patient representative verbalized consent for the use of Ambient Listening during the visit with  Sam Linn MD for chart  documentation. 6/19/2025  14:20 EDT    Sam Linn MD  [facility] [place]     This is electronically signed by Sam Linn MD  06/19/2025 14:19 EDT

## 2025-06-25 RX ORDER — CETIRIZINE HYDROCHLORIDE 10 MG/1
10 TABLET ORAL DAILY
Qty: 30 TABLET | Refills: 11 | Status: SHIPPED | OUTPATIENT
Start: 2025-06-25

## 2025-06-25 RX ORDER — FLUTICASONE PROPIONATE 50 MCG
2 SPRAY, SUSPENSION (ML) NASAL DAILY
Qty: 16 G | Refills: 11 | Status: SHIPPED | OUTPATIENT
Start: 2025-06-25

## 2025-06-30 ENCOUNTER — TELEPHONE (OUTPATIENT)
Age: 35
End: 2025-06-30
Payer: COMMERCIAL

## 2025-06-30 NOTE — TELEPHONE ENCOUNTER
I will talk to him about the possibility of switching at his next follow up appointment.  I hope he has further discontinued the use of marijuana.

## 2025-06-30 NOTE — TELEPHONE ENCOUNTER
Patient called stating they are not noticing a difference with Vyvance. They are requesting to switch to Adderall

## 2025-07-14 ENCOUNTER — TELEPHONE (OUTPATIENT)
Age: 35
End: 2025-07-14

## 2025-07-14 ENCOUNTER — TELEMEDICINE (OUTPATIENT)
Age: 35
End: 2025-07-14
Payer: COMMERCIAL

## 2025-07-14 DIAGNOSIS — F41.1 GENERALIZED ANXIETY DISORDER WITH PANIC ATTACKS: ICD-10-CM

## 2025-07-14 DIAGNOSIS — F41.0 GENERALIZED ANXIETY DISORDER WITH PANIC ATTACKS: ICD-10-CM

## 2025-07-14 DIAGNOSIS — F90.2 ADHD (ATTENTION DEFICIT HYPERACTIVITY DISORDER), COMBINED TYPE: Primary | ICD-10-CM

## 2025-07-14 DIAGNOSIS — F33.1 MDD (MAJOR DEPRESSIVE DISORDER), RECURRENT EPISODE, MODERATE: ICD-10-CM

## 2025-07-14 PROCEDURE — 96127 BRIEF EMOTIONAL/BEHAV ASSMT: CPT | Performed by: PSYCHIATRY & NEUROLOGY

## 2025-07-14 PROCEDURE — 1160F RVW MEDS BY RX/DR IN RCRD: CPT | Performed by: PSYCHIATRY & NEUROLOGY

## 2025-07-14 PROCEDURE — 99214 OFFICE O/P EST MOD 30 MIN: CPT | Performed by: PSYCHIATRY & NEUROLOGY

## 2025-07-14 PROCEDURE — 1159F MED LIST DOCD IN RCRD: CPT | Performed by: PSYCHIATRY & NEUROLOGY

## 2025-07-14 RX ORDER — DEXTROAMPHETAMINE SACCHARATE, AMPHETAMINE ASPARTATE, DEXTROAMPHETAMINE SULFATE AND AMPHETAMINE SULFATE 2.5; 2.5; 2.5; 2.5 MG/1; MG/1; MG/1; MG/1
10 TABLET ORAL 2 TIMES DAILY
Qty: 60 TABLET | Refills: 0 | Status: SHIPPED | OUTPATIENT
Start: 2025-07-14 | End: 2025-08-13

## 2025-07-14 RX ORDER — BUSPIRONE HYDROCHLORIDE 10 MG/1
10 TABLET ORAL 2 TIMES DAILY
Qty: 60 TABLET | Refills: 0 | Status: SHIPPED | OUTPATIENT
Start: 2025-07-14 | End: 2025-08-13

## 2025-07-14 RX ORDER — DESVENLAFAXINE 50 MG/1
50 TABLET, FILM COATED, EXTENDED RELEASE ORAL DAILY
Qty: 30 TABLET | Refills: 0 | Status: SHIPPED | OUTPATIENT
Start: 2025-07-14 | End: 2025-08-13

## 2025-07-14 NOTE — TELEPHONE ENCOUNTER
----- Message from Sam Linn sent at 7/14/2025  9:49 AM EDT -----  Follow up in 2 weeks video connect

## 2025-07-14 NOTE — PROGRESS NOTES
Video Visit Follow Up      Date: 2025  Patient Name: Ronn Lebron  : 1990   MRN: [id number]   Time In: 9:30 am      Time Out: 9:45 am     Referring Physician: Pj Samuels PA-C    Chaperone Statement: Aman SHANIA served as a chaperone for this visit and was present for the full visit from 09:30 to 09:45. Patient agreeable to chaperone presence during appointment.   Chief Complaint:      ICD-10-CM ICD-9-CM   1. ADHD (attention deficit hyperactivity disorder), combined type  F90.2 314.01   2. Generalized anxiety disorder with panic attacks  F41.1 300.02    F41.0 300.01   3. MDD (major depressive disorder), recurrent episode, moderate  F33.1 296.32        Mode of Visit: Video  Location of patient: -HOME-  Location of provider: +[facility]+  You have chosen to receive care through a telehealth visit.  The patient has signed the video visit consent form.  The visit included audio and video interaction. No technical issues occurred during this visit.    History of Present Illness: Ronn Lebron is a 34 y.o. male who I saw today thru a video visit for medication management     History of Present Illness  The patient presents via virtual visit for ADHD, anxiety, and panic attacks.    He reports a positive response to Vyvanse, although it has led to a decreased appetite. On days when he struggles to eat, he reduces his dosage from 50 mg to 40 mg, which slightly increases his anxiety. He has experienced weight loss but attributes this to increased physical activity, resulting in muscle gain. He is currently on Vyvanse 50 mg.    He is also taking buspirone 5 mg twice daily, which he finds beneficial for his physical and mental health. He has noticed an improvement in his posture and a decrease in anxiety. He is able to breathe and move more freely, and states that he can hold his breath for over 20 seconds. He is also on Pristiq 50 mg daily, which he believes has been life-saving. He has not  had any suicidal thoughts since starting this medication.    He has been using marijuana to cope with his ADHD symptoms, which he feels are not adequately managed by Vyvanse. He smokes a few joints a day, particularly when feeling anxious or missing something. He has tried CBD but found it unhelpful. He is considering switching to Adderall, as he has heard it can be effective for severe ADHD. He is currently seeing a therapist and plans to start online AA and grief counseling.    He has been experiencing panic attacks, which are not alleviated by Vyvanse. He has researched Adderall and other stimulants and spoken to several people who have switched from Vyvanse to Adderall with better results.    Interim History: He reports overall good mental health and positive changes since the last visit. He has successfully broken contact with his siblings and had a meaningful conversation with his father about dissociation. He feels physically active and is pleased with his muscle gain. He has been researching medications and discussing his condition with others who have similar experiences.    Social History:  - No close friends or family  - Plans to start online AA and grief counseling    Psychiatric History: He has tried various medications over the past 15 years, including SSRI's. He reports no adverse reactions to current medications.    Substance Use: He smokes a few joints of marijuana daily to manage anxiety and ADHD symptoms. He reports alcohol use only when discussing family matters but has not thought about alcohol since gaining clarity last week. He does not use tobacco products.    Pertinent Negatives: Reports no use of tobacco products.    SOCIAL HISTORY  The patient admits to smoking a few joints a day. The patient admits to drinking alcohol but only when talking to his dad about family issues. The patient does not use tobacco products.    FAMILY HISTORY  The patient mentions that his mother is an  alcoholic.        Subjective      Review of Systems:   The following portions of the patient's history were reviewed and updated as appropriate: allergies, current medications, past family history, past medical history, past social history, past surgical history and problem list.     Screening Scores:   PHQ-9 : 7  DASHA-7 : 7    Quality Measures:   Tobacco: Ronn Lebron  reports that he has never smoked. He has never been exposed to tobacco smoke. He has never used smokeless tobacco. I have educated him on the risk of diseases from using tobacco products such as cancer, COPD, heart disease, and he is a non-smoker of tobacco.   I spent 3  minutes counseling the patient.          Depression (PHQ >10): Patient screened negative for depression based on a PHQ-9 score of 7 on 7/14/2025. Follow up recommendations include: Prescribed antidepressant medication treatment, Suicide Risk Assessment performed, and Continue with medication management     Medications:     Current Outpatient Medications:     busPIRone (BUSPAR) 10 MG tablet, Take 1 tablet by mouth 2 (Two) Times a Day for 30 days., Disp: 60 tablet, Rfl: 0    desvenlafaxine (Pristiq) 50 MG 24 hr tablet, Take 1 tablet by mouth Daily for 30 days., Disp: 30 tablet, Rfl: 0    albuterol sulfate  (90 Base) MCG/ACT inhaler, Inhale 2 puffs Every 4 (Four) Hours As Needed for Wheezing or Shortness of Air., Disp: 18 g, Rfl: 0    amphetamine-dextroamphetamine (Adderall) 10 MG tablet, Take 1 tablet by mouth 2 (Two) Times a Day for 30 days., Disp: 60 tablet, Rfl: 0    cetirizine (zyrTEC) 10 MG tablet, Take 1 tablet by mouth Daily., Disp: 30 tablet, Rfl: 11    fluticasone (FLONASE) 50 MCG/ACT nasal spray, Administer 2 sprays into the nostril(s) as directed by provider Daily., Disp: 16 g, Rfl: 11    Fluticasone-Salmeterol (ADVAIR/WIXELA) 100-50 MCG/ACT DISKUS, Inhale 1 puff 2 (Two) Times a Day., Disp: 60 each, Rfl: 5    Pseudoephedrine-guaiFENesin (MUCINEX D PO), Take 1  dose by mouth As Needed (allergies)., Disp: , Rfl:     Medication Considerations:  AVELINA reviewed and appropriate.     Allergies:   Allergies   Allergen Reactions    Lexapro [Escitalopram] Mental Status Change     fatigue    Wellbutrin [Bupropion] Mental Status Change     Agitation, ineffective    Prozac [Fluoxetine Hcl] Diarrhea       Objective     Vital Signs:   The patient was seen remotely today via a video visit. Unable to obtain vital signs due to nature of remote visit. Patient stated that their height was 65 inches and their weight was 160 pounds.     Mental Status Exam:   MENTAL STATUS EXAM   General Appearance:  Cleanly groomed and dressed  Eye Contact:  Good eye contact  Attitude:  Cooperative  Motor Activity:  Normal gait, posture  Muscle Strength:  Normal  Speech:  Hyper talkative  Language:  Spontaneous  Mood and affect:  Normal, pleasant  Hopelessness:  Denies  Loneliness: Denies  Thought Process:  Logical, goal-directed and linear  Associations/ Thought Content:  No delusions  Hallucinations:  None  Suicidal Ideations:  Not present  Homicidal Ideation:  Not present  Sensorium:  Alert and clear  Orientation:  Person, place, time and situation  Immediate Recall, Recent, and Remote Memory:  Intact  Attention Span/ Concentration:  Easily distracted  Fund of Knowledge:  Appropriate for age and educational level  Intellectual Functioning:  Average range  Insight:  Limited  Judgement:  Fair  Reliability:  Fair  Impulse Control:  Fair        SUICIDE RISK ASSESSMENT/CSSRS:  1. Does patient have thoughts of suicide? denies  2. Does patient have intent for suicide? denies  3. Does patient have a current plan for suicide? denies  4. History of suicide attempts: denies  5. Family history of suicide or attempts: denies  6. History of violent behaviors towards others or property or thoughts of committing suicide: denies  7. History of sexual aggression toward others: denies  8. Access to firearms or weapons:  denies    Assessment / Plan      Visit Diagnosis/Orders Placed This Visit:  Diagnoses and all orders for this visit:    1. ADHD (attention deficit hyperactivity disorder), combined type (Primary)  -     amphetamine-dextroamphetamine (Adderall) 10 MG tablet; Take 1 tablet by mouth 2 (Two) Times a Day for 30 days.  Dispense: 60 tablet; Refill: 0    2. Generalized anxiety disorder with panic attacks  -     busPIRone (BUSPAR) 10 MG tablet; Take 1 tablet by mouth 2 (Two) Times a Day for 30 days.  Dispense: 60 tablet; Refill: 0  -     desvenlafaxine (Pristiq) 50 MG 24 hr tablet; Take 1 tablet by mouth Daily for 30 days.  Dispense: 30 tablet; Refill: 0    3. MDD (major depressive disorder), recurrent episode, moderate  -     desvenlafaxine (Pristiq) 50 MG 24 hr tablet; Take 1 tablet by mouth Daily for 30 days.  Dispense: 30 tablet; Refill: 0         Assessment & Plan  Problems:  - Attention deficit hyperactivity disorder (ADHD)  - Anxiety  - Panic attacks  - Marijuana use    Content of Therapy:  During the session, the patient discussed his recent experiences with dissociation, interactions with family members, and the impact of Vyvanse on his appetite and anxiety levels. The patient expressed concerns about the effectiveness of Vyvanse in managing his ADHD symptoms and mentioned increased marijuana use due to anxiety. The patient also shared positive feedback on buspirone's impact on his physical symptoms and anxiety. The discussion included potential medication adjustments, the benefits of therapy, and strategies for reducing marijuana use.    Clinical Impression:  The patient appears to be experiencing significant improvements in anxiety symptoms with buspirone, reporting better physical posture and reduced jaw tension. However, the current ADHD medication (Vyvanse) is causing appetite suppression and weight loss, and is not effectively managing ADHD symptoms. The patient is experiencing panic attacks and increased  marijuana use due to anxiety and ineffective ADHD management. Overall, the patient is physically active and motivated to find a more effective medication regimen.    Therapeutic Intervention:  - Discussion on switching ADHD medication from Vyvanse to Adderall short-acting 10 mg twice a day.  - Increasing buspirone dosage from 5 mg twice a day to 10 mg twice a day to better manage anxiety and reduce reliance on marijuana.  - Encouragement to continue therapy sessions and consider online AA and grief counseling.  - Exploration of the potential benefits of switching from THC to CBD for anxiety management.    Plan:  - Discontinue Vyvanse and start Adderall short-acting 10 mg twice a day.  - Increase buspirone dosage to 10 mg twice a day.  - Reduce marijuana use and consider switching to CBD.  - Continue therapy sessions and consider online AA and grief counseling.    Follow-up:  - Follow-up appointment scheduled in 2 weeks via video connect to assess the impact of medication changes and overall progress.    Notes & Risk Factors:  - Increased marijuana use due to anxiety and ineffective ADHD management.  - Positive response to buspirone for anxiety management.  - No reported use of tobacco products.  - Alcohol use related to family interactions, with recent reduction in consumption.      Labs Reviewed : labs reviewed  Chart since last visit reviewed : chart reviewed    Functional Status: No impairment    Prognosis: Good with Ongoing Treatment     Impression/Formulation:  Patient appeared alert and oriented. Patient is receptive to assistance with maintaining a stable lifestyle.  Patient presents with history of     ICD-10-CM ICD-9-CM   1. ADHD (attention deficit hyperactivity disorder), combined type  F90.2 314.01   2. Generalized anxiety disorder with panic attacks  F41.1 300.02    F41.0 300.01   3. MDD (major depressive disorder), recurrent episode, moderate  F33.1 296.32   .     Treatment Plan:     Patient will  continue supportive efforts and medications as indicated. Clinic will obtain release of information for current treatment team for continuity of care as needed. Patient will contact this office, call 911 or present to the nearest emergency room should suicidal or homicidal ideations occur.  Discussed medication options and treatment plan of prescribed medication(s) as well as the risks, benefits, and potential side effects. Patient acknowledged and verbally consented to continue with current treatment plan and was educated on the importance of compliance with treatment and follow-up appointments.       Follow Up:   Return in about 2 weeks (around 7/28/2025) for Recheck, Video visit.    Short-term goals: Patient will adhere to medication regimen and experience continued improvement in symptoms over the next 3 months.   Long-term goals: Patient will adhere to medication treatment plan and report improvement in symptoms over the next 6 months    Patient or patient representative verbalized consent for the use of Ambient Listening during the visit with  Sam Linn MD for chart documentation. 7/14/2025  09:30 EDT    Sam Linn MD  [facility] [address]     This is electronically signed by Sam Linn MD  07/14/2025 09:30 EDT

## 2025-07-31 ENCOUNTER — TELEMEDICINE (OUTPATIENT)
Age: 35
End: 2025-07-31
Payer: COMMERCIAL

## 2025-07-31 DIAGNOSIS — F90.2 ADHD (ATTENTION DEFICIT HYPERACTIVITY DISORDER), COMBINED TYPE: Primary | ICD-10-CM

## 2025-07-31 DIAGNOSIS — F33.1 MDD (MAJOR DEPRESSIVE DISORDER), RECURRENT EPISODE, MODERATE: ICD-10-CM

## 2025-07-31 DIAGNOSIS — F41.0 GENERALIZED ANXIETY DISORDER WITH PANIC ATTACKS: ICD-10-CM

## 2025-07-31 DIAGNOSIS — F41.1 GENERALIZED ANXIETY DISORDER WITH PANIC ATTACKS: ICD-10-CM

## 2025-07-31 RX ORDER — BUSPIRONE HYDROCHLORIDE 10 MG/1
10 TABLET ORAL 2 TIMES DAILY
Qty: 60 TABLET | Refills: 0 | Status: SHIPPED | OUTPATIENT
Start: 2025-07-31 | End: 2025-08-30

## 2025-07-31 RX ORDER — DESVENLAFAXINE 50 MG/1
50 TABLET, FILM COATED, EXTENDED RELEASE ORAL DAILY
Qty: 30 TABLET | Refills: 0 | Status: SHIPPED | OUTPATIENT
Start: 2025-07-31 | End: 2025-08-30

## 2025-07-31 RX ORDER — DEXTROAMPHETAMINE SACCHARATE, AMPHETAMINE ASPARTATE, DEXTROAMPHETAMINE SULFATE AND AMPHETAMINE SULFATE 5; 5; 5; 5 MG/1; MG/1; MG/1; MG/1
20 TABLET ORAL 2 TIMES DAILY
Qty: 60 TABLET | Refills: 0 | Status: SHIPPED | OUTPATIENT
Start: 2025-07-31 | End: 2025-08-30

## 2025-07-31 NOTE — PROGRESS NOTES
"    Video Visit Follow Up      Date: 2025  Patient Name: Ronn Lebron  : 1990   MRN: [id number]   Time In: 11:00 am      Time Out: 11:15 am     Chaperone Statement: Aman SHANIA served as a chaperone for this visit and was present for the full visit from 11:00 to 11:15. Patient agreeable to chaperone presence during appointment.   Referring Physician: Pj Samuels PA-C    Chief Complaint:      ICD-10-CM ICD-9-CM   1. ADHD (attention deficit hyperactivity disorder), combined type  F90.2 314.01   2. Generalized anxiety disorder with panic attacks  F41.1 300.02    F41.0 300.01   3. MDD (major depressive disorder), recurrent episode, moderate  F33.1 296.32        Mode of Visit: Video  Location of patient: -HOME-  Location of provider: +[facility]+  You have chosen to receive care through a telehealth visit.  The patient has signed the video visit consent form.  The visit included audio and video interaction. No technical issues occurred during this visit.    History of Present Illness: Ronn Lebron is a 34 y.o. male who I saw today thru a video visit for medication management.     History of Present Illness  The patient presents via virtual visit for ADHD, depression, and marijuana use.    He reports a positive response to the switch from Vyvanse to Adderall, describing the change as \"awesome\" and noting that he feels almost dialed in with the new medication. Alcohol consumption has significantly decreased, with drinking becoming \"pretty much nonexistent\" and socially unappealing. However, he continues to smoke marijuana due to his ADHD but expresses a desire to quit, acknowledging that it is a habitual behavior since childhood. He believes that increasing his Adderall dosage to 15 or 20 mg twice daily might help reduce his marijuana use and improve his ADHD symptoms. He estimates that he has reduced his marijuana use by about 20 percent and notes a change in his habitual smoking behavior. " He does not use tobacco products.    Interim History: He describes his mood as good and feels that he is making progress every day. He experiences feelings of hopelessness and loneliness but rates these as low. He has not communicated with his family for two weeks, which he finds relaxing. He reports no suicidal thoughts or hallucinations and feels very present. He also reports no issues with impulse control.    Social History:  - Seeking a new therapist  - Currently seeing Judy Geoffrey    Psychiatric History: He continues to take buspirone and Pristiq, which he finds helpful. He is considering discontinuing buspirone once he feels better.    Substance Use: He reports a significant reduction in alcohol consumption and estimates a 20 percent reduction in marijuana use. He does not use tobacco products.    Pertinent Negatives: He reports no suicidal thoughts, hallucinations, or impulse control issues.    SOCIAL HISTORY  He reports no alcohol intake. He admits to smoking marijuana due to his ADHD but is working on quitting. He does not use any tobacco products.        Subjective      Review of Systems:   The following portions of the patient's history were reviewed and updated as appropriate: allergies, current medications, past family history, past medical history, past social history, past surgical history and problem list.     Screening Scores:   PHQ-9 : 9  DASHA-7 : 7    Quality Measures:   Tobacco: Ronn Lebron  reports that he has never smoked. He has never been exposed to tobacco smoke. He has never used smokeless tobacco. I have educated him on the risk of diseases from using tobacco products such as cancer, COPD, heart disease, and he is a non-smoker.   I spent 1 minute counseling the patient.          Depression (PHQ >10): Patient screened negative for depression based on a PHQ-9 score of 9 on 7/31/2025. Follow up recommendations include: Prescribed antidepressant medication treatment, Suicide Risk  Assessment performed, and Continue with medication management     Medications:     Current Outpatient Medications:     busPIRone (BUSPAR) 10 MG tablet, Take 1 tablet by mouth 2 (Two) Times a Day for 30 days., Disp: 60 tablet, Rfl: 0    desvenlafaxine (Pristiq) 50 MG 24 hr tablet, Take 1 tablet by mouth Daily for 30 days., Disp: 30 tablet, Rfl: 0    albuterol sulfate  (90 Base) MCG/ACT inhaler, Inhale 2 puffs Every 4 (Four) Hours As Needed for Wheezing or Shortness of Air., Disp: 18 g, Rfl: 0    amphetamine-dextroamphetamine (Adderall) 20 MG tablet, Take 1 tablet by mouth 2 (Two) Times a Day for 30 days., Disp: 60 tablet, Rfl: 0    cetirizine (zyrTEC) 10 MG tablet, Take 1 tablet by mouth Daily., Disp: 30 tablet, Rfl: 11    fluticasone (FLONASE) 50 MCG/ACT nasal spray, Administer 2 sprays into the nostril(s) as directed by provider Daily., Disp: 16 g, Rfl: 11    Fluticasone-Salmeterol (ADVAIR/WIXELA) 100-50 MCG/ACT DISKUS, Inhale 1 puff 2 (Two) Times a Day., Disp: 60 each, Rfl: 5    Pseudoephedrine-guaiFENesin (MUCINEX D PO), Take 1 dose by mouth As Needed (allergies)., Disp: , Rfl:     Medication Considerations:  AVELINA reviewed and appropriate.     Allergies:   Allergies   Allergen Reactions    Lexapro [Escitalopram] Mental Status Change     fatigue    Wellbutrin [Bupropion] Mental Status Change     Agitation, ineffective    Prozac [Fluoxetine Hcl] Diarrhea       Objective     Physical Exam:  Vital Signs:   The patient was seen remotely today via a video visit. Unable to obtain vital signs due to nature of remote visit. Patient stated that their height was 65 inches and their weight was 160 pounds.     Mental Status Exam:   MENTAL STATUS EXAM   General Appearance:  Cleanly groomed and dressed  Eye Contact:  Good eye contact  Attitude:  Cooperative  Motor Activity:  Normal gait, posture  Muscle Strength:  Normal  Speech:  Normal rate, tone, volume  Language:  Spontaneous  Mood and affect:   Euthymic  Hopelessness:  2  Loneliness: 4  Thought Process:  Logical, goal-directed and linear  Associations/ Thought Content:  No delusions  Hallucinations:  None  Suicidal Ideations:  Not present  Homicidal Ideation:  Not present  Sensorium:  Alert and clear  Orientation:  Person, place, time and situation  Immediate Recall, Recent, and Remote Memory:  Intact  Attention Span/ Concentration:  Good  Fund of Knowledge:  Appropriate for age and educational level  Intellectual Functioning:  Average range  Insight:  Fair  Judgement:  Fair  Reliability:  Fair  Impulse Control:  Fair        SUICIDE RISK ASSESSMENT/CSSRS:  1. Does patient have thoughts of suicide? denies  2. Does patient have intent for suicide? denies  3. Does patient have a current plan for suicide? denies  4. History of suicide attempts: denies  5. Family history of suicide or attempts: denies  6. History of violent behaviors towards others or property or thoughts of committing suicide: denies  7. History of sexual aggression toward others: denies  8. Access to firearms or weapons: denies    Assessment / Plan      Visit Diagnosis/Orders Placed This Visit:  Diagnoses and all orders for this visit:    1. ADHD (attention deficit hyperactivity disorder), combined type (Primary)  -     amphetamine-dextroamphetamine (Adderall) 20 MG tablet; Take 1 tablet by mouth 2 (Two) Times a Day for 30 days.  Dispense: 60 tablet; Refill: 0    2. Generalized anxiety disorder with panic attacks  -     desvenlafaxine (Pristiq) 50 MG 24 hr tablet; Take 1 tablet by mouth Daily for 30 days.  Dispense: 30 tablet; Refill: 0  -     busPIRone (BUSPAR) 10 MG tablet; Take 1 tablet by mouth 2 (Two) Times a Day for 30 days.  Dispense: 60 tablet; Refill: 0    3. MDD (major depressive disorder), recurrent episode, moderate  -     desvenlafaxine (Pristiq) 50 MG 24 hr tablet; Take 1 tablet by mouth Daily for 30 days.  Dispense: 30 tablet; Refill: 0         Assessment & Plan  Problems:  -  Attention deficit hyperactivity disorder (ADHD)  - Depression  - Marijuana use    Content of Therapy:  During the session, the patient discussed the positive effects of switching from Vyvanse to Adderall for managing ADHD symptoms. He expressed a desire to increase the Adderall dosage to reduce his urge to smoke marijuana. The patient also shared his progress in reducing marijuana use and his goal to quit completely. Additionally, he reported feeling better overall with his current medication regimen, including Pristiq and buspirone. The patient explored feelings of sadness and loneliness, noting improvements in mood and a decrease in feelings of hopelessness since reducing contact with his family.    Clinical Impression:  The patient appears to be responding well to the current treatment plan. He reports significant improvement in ADHD symptoms with Adderall and a reduction in marijuana use. His mood has improved, and he feels less hopeless and more relaxed since reducing family interactions. The patient does not report any suicidal thoughts, hallucinations, or impulse control issues. Overall, he is experiencing gradual improvement in his mental health and well-being.    Therapeutic Intervention:  The therapeutic interventions discussed included increasing the Adderall dosage to 20 mg twice a day to better manage ADHD symptoms and reduce the urge to smoke marijuana. The patient was encouraged to continue working towards complete cessation of marijuana use to enhance the effectiveness of Adderall. The current medication regimen of Pristiq and buspirone was maintained.    Plan:  - Increase Adderall to 20 mg twice a day, short-acting  - Continue Pristiq 50 mg daily  - Continue buspirone as prescribed  - Completely stop smoking marijuana  - Monitor mood and ADHD symptoms    Follow-up:  A follow-up appointment is scheduled for the first week of September 2025 via video consultation.      Labs Reviewed : labs  reviewed  Chart since last visit reviewed : chart reviewed    Functional Status: No impairment    Prognosis: Good with Ongoing Treatment     Impression/Formulation:  Patient appeared alert and oriented. Patient is receptive to assistance with maintaining a stable lifestyle.  Patient presents with history of     ICD-10-CM ICD-9-CM   1. ADHD (attention deficit hyperactivity disorder), combined type  F90.2 314.01   2. Generalized anxiety disorder with panic attacks  F41.1 300.02    F41.0 300.01   3. MDD (major depressive disorder), recurrent episode, moderate  F33.1 296.32   .     Treatment Plan:     Patient will continue supportive efforts and medications as indicated. Clinic will obtain release of information for current treatment team for continuity of care as needed. Patient will contact this office, call 911 or present to the nearest emergency room should suicidal or homicidal ideations occur.  Discussed medication options and treatment plan of prescribed medication(s) as well as the risks, benefits, and potential side effects. Patient acknowledged and verbally consented to continue with current treatment plan and was educated on the importance of compliance with treatment and follow-up appointments.       Follow Up:   Return in about 5 weeks (around 9/4/2025) for Next scheduled follow up, Recheck.    Short-term goals: Patient will adhere to medication regimen and experience continued improvement in symptoms over the next 3 months.   Long-term goals: Patient will adhere to medication treatment plan and report improvement in symptoms over the next 6 months    Patient or patient representative verbalized consent for the use of Ambient Listening during the visit with  Sam Linn MD for chart documentation. 7/31/2025  11:00 EDT    Sam Linn MD  [facility] [address]     This is electronically signed by Sam Linn MD  07/31/2025 11:03 EDT

## (undated) DEVICE — T-DRAPE,EXTREMITY,STERILE: Brand: MEDLINE

## (undated) DEVICE — STRAP POSTN KN/BDY FM 5X72IN DISP

## (undated) DEVICE — BANDAGE,ELASTIC,ESMARK,STERILE,6"X9',LF: Brand: MEDLINE

## (undated) DEVICE — BNDG ESMARK 6INX9FT STRL

## (undated) DEVICE — GLV SURG PREMIERPRO MIC LTX PF SZ7.5 BRN

## (undated) DEVICE — PAD ARMBRD SURG CONVOL 7.5X20X2IN

## (undated) DEVICE — NDL FLTR BLNT 18G 1 1/2IN

## (undated) DEVICE — INTENDED FOR TISSUE SEPARATION, AND OTHER PROCEDURES THAT REQUIRE A SHARP SURGICAL BLADE TO PUNCTURE OR CUT.: Brand: BARD-PARKER ® STAINLESS STEEL BLADES

## (undated) DEVICE — UNDERCAST PADDING: Brand: DEROYAL

## (undated) DEVICE — PAD,ARMBOARD,CONV,FOAM,2X8X20",12PR/CS: Brand: MEDLINE

## (undated) DEVICE — PK ARTHSCP KN 10

## (undated) DEVICE — Device

## (undated) DEVICE — PATIENT RETURN ELECTRODE, SINGLE-USE, CONTACT QUALITY MONITORING, ADULT, WITH 9FT CORD, FOR PATIENTS WEIGING OVER 33LBS. (15KG): Brand: MEGADYNE

## (undated) DEVICE — TP MICROFM 4IN LF

## (undated) DEVICE — GLV SURG SENSICARE PI LF PF 7.0

## (undated) DEVICE — GLV SURG SENSICARE PI MIC PF SZ7.5 LF STRL

## (undated) DEVICE — TB CASSET ARTHSCP CROSSFLOW INFLOW LF

## (undated) DEVICE — SUT ETHLN 3/0 FS1 30IN 669H

## (undated) DEVICE — GLV SURG SENSICARE PI LF PF 7.5

## (undated) DEVICE — DRESSING,GAUZE,XEROFORM,CURAD,1"X8",ST: Brand: CURAD

## (undated) DEVICE — TAPE,ELASTIC,FOAM,CURAD,4"X5.5YD,LF: Brand: CURAD

## (undated) DEVICE — STRYKER 2-0 KNOT PUSHER/SUTURE CUTTER AND SLOTTED CANNULA SET

## (undated) DEVICE — BLD CUT FORMLA AGGR ANGL 4MM

## (undated) DEVICE — GLV SURG SENSICARE PI MIC PF SZ8 LF STRL

## (undated) DEVICE — DRSNG PAD ABD 8X10IN STRL

## (undated) DEVICE — DRSNG GZ PETROLTM XEROFORM CURAD 1X8IN STRL

## (undated) DEVICE — BLANKT WARM UPPR/BDY ARM/OUT 57X196CM

## (undated) DEVICE — GLV SURG PREMIERPRO MIC LTX PF SZ7 BRN

## (undated) DEVICE — NDL REPR MENISC SUT TP 2-0 MINI

## (undated) DEVICE — SYR LL W/SCALE/MARK 3ML STRL

## (undated) DEVICE — GLV SURG PREMIERPRO MIC LTX PF SZ6.5 BRN